# Patient Record
Sex: MALE | Race: WHITE | Employment: FULL TIME | URBAN - METROPOLITAN AREA
[De-identification: names, ages, dates, MRNs, and addresses within clinical notes are randomized per-mention and may not be internally consistent; named-entity substitution may affect disease eponyms.]

---

## 2017-04-18 ENCOUNTER — HOSPITAL ENCOUNTER (INPATIENT)
Facility: HOSPITAL | Age: 57
LOS: 2 days | Discharge: HOME | DRG: 310 | End: 2017-04-21
Attending: EMERGENCY MEDICINE | Admitting: INTERNAL MEDICINE
Payer: COMMERCIAL

## 2017-04-18 ENCOUNTER — APPOINTMENT (OUTPATIENT)
Dept: GENERAL RADIOLOGY | Facility: HOSPITAL | Age: 57
DRG: 310 | End: 2017-04-18
Attending: EMERGENCY MEDICINE
Payer: COMMERCIAL

## 2017-04-18 DIAGNOSIS — I48.91 ATRIAL FIBRILLATION WITH RAPID VENTRICULAR RESPONSE (HCC): Primary | ICD-10-CM

## 2017-04-18 PROCEDURE — 85025 COMPLETE CBC W/AUTO DIFF WBC: CPT | Performed by: EMERGENCY MEDICINE

## 2017-04-18 PROCEDURE — 84443 ASSAY THYROID STIM HORMONE: CPT | Performed by: EMERGENCY MEDICINE

## 2017-04-18 PROCEDURE — 71010 XR CHEST AP PORTABLE  (CPT=71010): CPT

## 2017-04-18 PROCEDURE — 84484 ASSAY OF TROPONIN QUANT: CPT | Performed by: EMERGENCY MEDICINE

## 2017-04-18 PROCEDURE — 96365 THER/PROPH/DIAG IV INF INIT: CPT

## 2017-04-18 PROCEDURE — 80053 COMPREHEN METABOLIC PANEL: CPT | Performed by: EMERGENCY MEDICINE

## 2017-04-18 PROCEDURE — 96366 THER/PROPH/DIAG IV INF ADDON: CPT

## 2017-04-18 PROCEDURE — 85379 FIBRIN DEGRADATION QUANT: CPT | Performed by: EMERGENCY MEDICINE

## 2017-04-18 PROCEDURE — 93010 ELECTROCARDIOGRAM REPORT: CPT | Performed by: EMERGENCY MEDICINE

## 2017-04-18 PROCEDURE — 99285 EMERGENCY DEPT VISIT HI MDM: CPT

## 2017-04-18 PROCEDURE — 93005 ELECTROCARDIOGRAM TRACING: CPT

## 2017-04-18 RX ORDER — ASPIRIN 325 MG
325 TABLET ORAL DAILY
Status: ON HOLD | COMMUNITY
End: 2017-06-17

## 2017-04-18 RX ORDER — DILTIAZEM HYDROCHLORIDE 5 MG/ML
10 INJECTION INTRAVENOUS
Status: DISPENSED | OUTPATIENT
Start: 2017-04-18 | End: 2017-04-19

## 2017-04-19 ENCOUNTER — APPOINTMENT (OUTPATIENT)
Dept: CV DIAGNOSTICS | Facility: HOSPITAL | Age: 57
DRG: 310 | End: 2017-04-19
Attending: INTERNAL MEDICINE
Payer: COMMERCIAL

## 2017-04-19 ENCOUNTER — APPOINTMENT (OUTPATIENT)
Dept: INTERVENTIONAL RADIOLOGY/VASCULAR | Facility: HOSPITAL | Age: 57
DRG: 310 | End: 2017-04-19
Attending: NURSE PRACTITIONER
Payer: COMMERCIAL

## 2017-04-19 PROCEDURE — 93320 DOPPLER ECHO COMPLETE: CPT

## 2017-04-19 PROCEDURE — 93306 TTE W/DOPPLER COMPLETE: CPT | Performed by: INTERNAL MEDICINE

## 2017-04-19 PROCEDURE — 93312 ECHO TRANSESOPHAGEAL: CPT

## 2017-04-19 PROCEDURE — 93306 TTE W/DOPPLER COMPLETE: CPT

## 2017-04-19 PROCEDURE — 92960 CARDIOVERSION ELECTRIC EXT: CPT

## 2017-04-19 PROCEDURE — 80048 BASIC METABOLIC PNL TOTAL CA: CPT | Performed by: INTERNAL MEDICINE

## 2017-04-19 PROCEDURE — 85027 COMPLETE CBC AUTOMATED: CPT | Performed by: INTERNAL MEDICINE

## 2017-04-19 PROCEDURE — 85730 THROMBOPLASTIN TIME PARTIAL: CPT | Performed by: INTERNAL MEDICINE

## 2017-04-19 PROCEDURE — 93325 DOPPLER ECHO COLOR FLOW MAPG: CPT

## 2017-04-19 PROCEDURE — B246ZZ4 ULTRASONOGRAPHY OF RIGHT AND LEFT HEART, TRANSESOPHAGEAL: ICD-10-PCS | Performed by: INTERNAL MEDICINE

## 2017-04-19 PROCEDURE — 84132 ASSAY OF SERUM POTASSIUM: CPT | Performed by: INTERNAL MEDICINE

## 2017-04-19 RX ORDER — SODIUM CHLORIDE 9 MG/ML
INJECTION, SOLUTION INTRAVENOUS
Status: DISPENSED
Start: 2017-04-19 | End: 2017-04-20

## 2017-04-19 RX ORDER — HEPARIN SODIUM 1000 [USP'U]/ML
INJECTION, SOLUTION INTRAVENOUS; SUBCUTANEOUS
Status: DISPENSED
Start: 2017-04-19 | End: 2017-04-19

## 2017-04-19 RX ORDER — HEPARIN SODIUM AND DEXTROSE 10000; 5 [USP'U]/100ML; G/100ML
1000 INJECTION INTRAVENOUS ONCE
Status: COMPLETED | OUTPATIENT
Start: 2017-04-19 | End: 2017-04-19

## 2017-04-19 RX ORDER — POTASSIUM CHLORIDE 20 MEQ/1
40 TABLET, EXTENDED RELEASE ORAL ONCE
Status: COMPLETED | OUTPATIENT
Start: 2017-04-19 | End: 2017-04-19

## 2017-04-19 RX ORDER — SODIUM CHLORIDE 9 MG/ML
INJECTION, SOLUTION INTRAVENOUS
Status: COMPLETED
Start: 2017-04-19 | End: 2017-04-19

## 2017-04-19 RX ORDER — HEPARIN SODIUM AND DEXTROSE 10000; 5 [USP'U]/100ML; G/100ML
INJECTION INTRAVENOUS CONTINUOUS
Status: DISCONTINUED | OUTPATIENT
Start: 2017-04-19 | End: 2017-04-19

## 2017-04-19 RX ORDER — HEPARIN SODIUM 5000 [USP'U]/ML
5000 INJECTION, SOLUTION INTRAVENOUS; SUBCUTANEOUS EVERY 12 HOURS SCHEDULED
Status: DISCONTINUED | OUTPATIENT
Start: 2017-04-19 | End: 2017-04-19

## 2017-04-19 RX ORDER — ASPIRIN 325 MG
325 TABLET ORAL DAILY
Status: DISCONTINUED | OUTPATIENT
Start: 2017-04-19 | End: 2017-04-19

## 2017-04-19 RX ORDER — HEPARIN SODIUM 1000 [USP'U]/ML
5000 INJECTION, SOLUTION INTRAVENOUS; SUBCUTANEOUS ONCE
Status: COMPLETED | OUTPATIENT
Start: 2017-04-19 | End: 2017-04-19

## 2017-04-19 RX ORDER — SODIUM CHLORIDE 9 MG/ML
INJECTION, SOLUTION INTRAVENOUS ONCE
Status: DISCONTINUED | OUTPATIENT
Start: 2017-04-19 | End: 2017-04-21

## 2017-04-19 RX ORDER — ASPIRIN 325 MG
325 TABLET ORAL DAILY
Status: DISCONTINUED | OUTPATIENT
Start: 2017-04-19 | End: 2017-04-21

## 2017-04-19 RX ORDER — MIDAZOLAM HYDROCHLORIDE 1 MG/ML
INJECTION INTRAMUSCULAR; INTRAVENOUS
Status: COMPLETED
Start: 2017-04-19 | End: 2017-04-19

## 2017-04-19 NOTE — H&P
[facility]    History and Physical    Tarun Feng Patient Status:  Inpatient    1960 MRN [id number]   Location [facility]5W Attending Cassie Dye MD   [facility] Day # 1 PCP None Pcp     Date:  2017  Date of Admission: WBC 7.3 04/19/2017   HGB 15.3 04/19/2017   HCT 44.7 04/19/2017    04/19/2017   CREATSERUM 0.97 04/19/2017   BUN 20 04/19/2017    04/19/2017   K 4.9 04/19/2017   K 4.9 04/19/2017    04/19/2017   CO2 29 04/19/2017   * 04/19/2017

## 2017-04-19 NOTE — CONSULTS
[facility] AND [facility]  Cardiology Consultation    Sadie Molina Patient Status:  Inpatient    1960 MRN [id number]   Location [facility]5W Attending Gilberto Montenegro MD   Hosp Day # 1 PCP None Pcp     Reason for Consultation:  Atrial fibrillation bruits  Cardiac: irreg irreg, no murmurs  Lungs: Clear without wheezes, no crackles  Abdomen: Soft, non-tender. Extremities: Without clubbing, cyanosis or edema. Peripheral pulses are palpable  Neurologic: Alert and oriented, normal affect.   Skin: Warm

## 2017-04-19 NOTE — PLAN OF CARE
Patient Centered Care    • Patient preferences are identified and integrated in the patient's plan of care Progressing    Client participates in care.  Activity limited D/T client HR possible cardioversion today, client has been NPO    Patient/Family Goals

## 2017-04-19 NOTE — PROCEDURES
Pre-op: Atrial fib  Post-op: Same  Procedure: LINN  Findings:  STIVEN thrombus                MVP with 2+ MR                LV normal                No ASD  Complications: none  Sedation: Versed and Fentanyl  EBL: 0 cc  Specimen: none  Condition: stable

## 2017-04-19 NOTE — PAYOR COMM NOTE
Impression:  Atrial fibrillation  Palpitations    Plan:  His last episode was 7 years ago and he was successfully electrically cardioverted at that time. He knows the exact onset, which was at 9PM last night.   He would prefer to try DCCV first if possible

## 2017-04-19 NOTE — TREATMENT PLAN
Client off the floor for LINN and cardioversion, consent signed per [facility] NP instructions. This RN will await clients arrival to floor.

## 2017-04-19 NOTE — ED PROVIDER NOTES
Patient Seen in: [facility] AND [facility] Emergency Department    History   Patient presents with:  Arrythmia/Palpitations (cardiovascular)    Stated Complaint: Heart Palpitations    HPI    Patient is a 58-year-old male whose describes onset of palpitations abo Cardiovascular: Irregular rate and rhythm. Pulmonary/Chest: Effort normal and breath sounds normal. No respiratory distress. Abdominal: Soft. Bowel sounds are normal. Exhibits no distension and no mass. There is no tenderness.  There is no rebound an 95%, Normal, room air    Cardiac Monitor: Atrial fib Pulse Readings from Last 1 Encounters:  04/19/17 : 104  ,    Radiology findings:     @.im     xray and findings reviewed by myself and discussed with patient and family if present        Disposition and

## 2017-04-20 PROCEDURE — 85049 AUTOMATED PLATELET COUNT: CPT | Performed by: INTERNAL MEDICINE

## 2017-04-20 RX ORDER — METOPROLOL TARTRATE 50 MG/1
50 TABLET, FILM COATED ORAL
Status: DISCONTINUED | OUTPATIENT
Start: 2017-04-20 | End: 2017-04-20

## 2017-04-20 RX ORDER — METOPROLOL TARTRATE 50 MG/1
50 TABLET, FILM COATED ORAL ONCE
Status: COMPLETED | OUTPATIENT
Start: 2017-04-20 | End: 2017-04-20

## 2017-04-20 RX ORDER — METOPROLOL TARTRATE 50 MG/1
100 TABLET, FILM COATED ORAL
Status: DISCONTINUED | OUTPATIENT
Start: 2017-04-20 | End: 2017-04-21

## 2017-04-20 NOTE — PROGRESS NOTES
LINN could not definitively r/o STIVEN thrombus. DCCV was therefore not performed. Plan:  Anticoagulate with Eliquis  Rate control. Cardiovert in 1 month if still in AF.

## 2017-04-20 NOTE — PROGRESS NOTES
[facility] Heart Cardiology Progress Note      Tarun Feng Patient Status:  Inpatient    1960 MRN [id number]   Location [facility]5W Attending Cassie Dye MD   Hosp Day # 2 PCP None Pcp       Assessment an 2x Daily(Beta Blocker)       Continuous Infusions:   • diltiazem Stopped (04/19/17 6135)       TELE:  afib with heart rates 120-130's.      Results:     Lab Results  Component Value Date   WBC 7.3 04/19/2017   HGB 15.3 04/19/2017   HCT 44.7 04/19/2017   PLT

## 2017-04-20 NOTE — PAYOR COMM NOTE
Attending Physician: Jakob Haynes MD    Review Type: ADMISSION   Reviewer:  Viridiana Ariza       Date: April 20, 2017 - 2:17 PM  Payor: [facility] POS/KATERIN  Authorization Number: [id number]  Admit date: 4/18/2017  9:45 PM   Admitted from Emergency Dep 04/18/17 2150 Oral   SpO2 04/18/17 2150 95 %   O2 Device 04/18/17 2150 None (Room air)       Current:/62 mmHg  Pulse 104  Temp(Src) 98 °F (36.7 °C) (Oral)  Resp 18  Ht 195.6 cm (6' 5\")  Wt 120.203 kg  BMI 31.42 kg/m2  SpO2 97%        Physical Exam -----------         ------                     CBC W/ DIFFERENTIAL[[id number]]                              Final result                 Please view results for these tests on the individual orders.    RAINBOW DRAW BLUE   RAINBOW DRAW GOLD describes onset of palpitations about an hour ago it occurred at rest.  He denies chest pain he denies shortness of breath he denies syncope or near syncope or lightheadedness.   Patient states he has had SVT in the past.  He had an ASD repaired about 20 ye tenderness. There is no rebound and no guarding. Musculoskeletal: Normal range of motion. Exhibits no edema or tenderness. Lymphadenopathy: No cervical adenopathy. Neurological: Alert and oriented to person, place, and time. Normal reflexes.  No crani present        Disposition and Plan     Clinical Impression:  Atrial fibrillation with rapid ventricular response ([facility] [address]  (primary encounter diagnosis)    Disposition:  There is no disposition on file for this visit.     Follow-up:  No follow-up provider spe Negative. Endocrine: Negative. Genitourinary: Negative. Musculoskeletal: Negative. Skin: Negative. Neurological: Negative. Hematological: Negative. Psychiatric/Behavioral: Negative.         Physical Exam:   Vital Signs:  Blood pressure signed by Kristin Orozco MD on 4/19/2017  6:58 PM        REVIEWER COMMENTS:     OTHER:

## 2017-04-20 NOTE — PROGRESS NOTES
[facility]    Progress Note    Fallon Balloon Patient Status:  Inpatient    1960 MRN [id number]   Location [facility]5W Attending Ang Berry MD   Hosp Day # 2 PCP None Pcp     Subjective:     Constitutional: Negative. discrepancies. Ekg 12-lead    4/18/2017  ECG Report  Interpretation  -------------------------- Atrial fibrillation -with ectopic ventricular couplets -Diffuse ST depression -consider subendocardial injury/ischemia.  ABNORMAL No previous ECGs availa

## 2017-04-20 NOTE — PLAN OF CARE
DISCHARGE PLANNING    • Discharge to home or other facility with appropriate resources Progressing    D/C pending HR control medication dosage have been increased throughout the day     PAIN - ADULT    • Verbalizes/displays adequate comfort level or patien

## 2017-04-21 VITALS
WEIGHT: 267.56 LBS | RESPIRATION RATE: 16 BRPM | DIASTOLIC BLOOD PRESSURE: 64 MMHG | HEIGHT: 77 IN | TEMPERATURE: 98 F | HEART RATE: 98 BPM | SYSTOLIC BLOOD PRESSURE: 106 MMHG | OXYGEN SATURATION: 96 % | BODY MASS INDEX: 31.59 KG/M2

## 2017-04-21 PROCEDURE — 85049 AUTOMATED PLATELET COUNT: CPT | Performed by: INTERNAL MEDICINE

## 2017-04-21 RX ORDER — METOPROLOL TARTRATE 100 MG/1
100 TABLET ORAL
Qty: 60 TABLET | Refills: 0 | Status: SHIPPED | OUTPATIENT
Start: 2017-04-21 | End: 2017-05-08

## 2017-04-21 NOTE — DISCHARGE SUMMARY
[facility]    Discharge Summary    Sadie Molina Patient Status:  Inpatient    1960 MRN [id number]   Location [facility]5W Attending Gilberto Montenegro MD   Hosp Day # 3 PCP None Pcp     Date of Admission: 2017 Dispositi 60 tablet   Refills:  0       Metoprolol Tartrate 100 MG Tabs   Last time this was given:  100 mg on 4/21/2017  5:34 AM   Commonly known as:  LOPRESSOR        Take 1 tablet (100 mg total) by mouth 2x Daily(Beta Blocker).     Quantity:  60 tablet   Refills:

## 2017-04-21 NOTE — PLAN OF CARE
CARDIOVASCULAR - ADULT    • Maintains optimal cardiac output and hemodynamic stability Progressing    • Absence of cardiac arrhythmias or at baseline Progressing    HR is improving. A-fib on tele.  HR ranges from  bpm.     DISCHARGE PLANNING    • Disc

## 2017-04-21 NOTE — PROGRESS NOTES
[facility] Heart Cardiology  Progress Note    Claudene Seltzer Patient Status:  Inpatient    1960 MRN [id number]   Location [facility]5W Attending Mary Ellen Talamantes MD   [facility] Day # 3 PCP None Pcp     Edilberto Contreras K 4.9 04/19/2017    04/19/2017   CO2 29 04/19/2017   * 04/19/2017   CA 9.4 04/19/2017   ALB 4.2 04/18/2017   ALKPHO 61 04/18/2017   BILT 0.9 04/18/2017   TP 6.9 04/18/2017   AST 37 04/18/2017   ALT 32 04/18/2017   PTT 40.2* 04/19/2017   TSH

## 2017-04-24 ENCOUNTER — TELEPHONE (OUTPATIENT)
Dept: CARDIOLOGY UNIT | Facility: HOSPITAL | Age: 57
End: 2017-04-24

## 2017-04-24 NOTE — PAYOR COMM NOTE
Admission Info: Inpatient (Adm: 04/18/17)   Hospital Account: [de-identified]    Description: 61year old M   Primary Service: Cardiac Telemetry   Unit Info: [facility] OBS         Discharge Summary Notes      Discharge Summaries by Edgardo Chávez MD at 4/2 with palpitations    Hospital Course: Pt admitted seen by cardiology started on eliquis and metoprolol.  HR improved , seen by EP  Will DC home if ok with cardiology    Consultations: Cardiology    Discharge Condition: Stable    Discharge Medications:

## 2017-04-26 ENCOUNTER — OFFICE VISIT (OUTPATIENT)
Dept: CARDIOLOGY CLINIC | Facility: HOSPITAL | Age: 57
End: 2017-04-26
Attending: INTERNAL MEDICINE
Payer: COMMERCIAL

## 2017-04-26 VITALS
HEART RATE: 104 BPM | OXYGEN SATURATION: 94 % | DIASTOLIC BLOOD PRESSURE: 68 MMHG | WEIGHT: 271 LBS | SYSTOLIC BLOOD PRESSURE: 106 MMHG | BODY MASS INDEX: 32 KG/M2

## 2017-04-26 DIAGNOSIS — R06.83 SNORING: ICD-10-CM

## 2017-04-26 DIAGNOSIS — I48.0 PAF (PAROXYSMAL ATRIAL FIBRILLATION) (HCC): Chronic | ICD-10-CM

## 2017-04-26 DIAGNOSIS — I48.19 PERSISTENT ATRIAL FIBRILLATION (HCC): ICD-10-CM

## 2017-04-26 DIAGNOSIS — G47.9 SLEEP DISTURBANCE: ICD-10-CM

## 2017-04-26 DIAGNOSIS — R42 DIZZINESS: ICD-10-CM

## 2017-04-26 DIAGNOSIS — I48.91 ATRIAL FIBRILLATION (HCC): Primary | ICD-10-CM

## 2017-04-26 DIAGNOSIS — Q21.1 ASD (ATRIAL SEPTAL DEFECT): ICD-10-CM

## 2017-04-26 DIAGNOSIS — I48.91 ATRIAL FIBRILLATION WITH RAPID VENTRICULAR RESPONSE (HCC): ICD-10-CM

## 2017-04-26 DIAGNOSIS — R07.89 ATYPICAL CHEST PAIN: ICD-10-CM

## 2017-04-26 PROBLEM — R07.9 CHEST PAIN: Status: ACTIVE | Noted: 2017-04-26

## 2017-04-26 PROBLEM — Q21.10 ASD (ATRIAL SEPTAL DEFECT) (HCC): Status: ACTIVE | Noted: 2017-04-26

## 2017-04-26 PROBLEM — Q21.10 ASD (ATRIAL SEPTAL DEFECT): Status: ACTIVE | Noted: 2017-04-26

## 2017-04-26 PROCEDURE — 93010 ELECTROCARDIOGRAM REPORT: CPT | Performed by: NURSE PRACTITIONER

## 2017-04-26 PROCEDURE — 99214 OFFICE O/P EST MOD 30 MIN: CPT | Performed by: NURSE PRACTITIONER

## 2017-04-26 PROCEDURE — 93005 ELECTROCARDIOGRAM TRACING: CPT

## 2017-04-26 PROCEDURE — 99212 OFFICE O/P EST SF 10 MIN: CPT | Performed by: NURSE PRACTITIONER

## 2017-04-26 RX ORDER — DIGOXIN 125 MCG
125 TABLET ORAL DAILY
Qty: 30 TABLET | Refills: 1 | Status: ON HOLD | OUTPATIENT
Start: 2017-04-26 | End: 2017-06-17

## 2017-04-26 NOTE — PROGRESS NOTES
[address]    Precious Thrasher Patient Status:  Outpatient    1960 MRN [id number]   Location [facility] None Pcp   Deb Macdonald MD        Precious Thrasher is a 61year old male who presents to clinic for [facility] melena, nausea and vomiting  Hematologic/lymphatic: negative  Musculoskeletal: negative for muscle weakness and myalgias    Objective:    Lab Results  Component Value Date/Time   WBC 7.3 04/19/2017 06:56 AM   HGB 15.3 04/19/2017 06:56 AM   HCT 44.7 04/19/2 family receptive. Assessment:  Atrial fibrillation with RVR, rate controlled on increased metoprolol to tartrate.   Anticoagulated on Eliquis  History of PAF and PSVT with multiple cardioversions  History of ASD repair in 1997  Snoring with sleep disturb to make an appointment for a nuclear stress test at Dr. Bryant Ramirez office in the next 2-4 weeks, call [phone] to schedule    Call to make an appointment to schedule a sleep study test with a [facility] sleep center at [phone]    The physician

## 2017-04-26 NOTE — PATIENT INSTRUCTIONS
Continue all your other same medications    Begin taking digoxin 0.125 mg one tablet daily    Call if having any dizziness, lightheadedness, heart racing, palpitations, chest pain, shortness of breath, coughing, swelling, weight gain or worsening symptoms.

## 2017-05-08 ENCOUNTER — OFFICE VISIT (OUTPATIENT)
Dept: CARDIOLOGY CLINIC | Facility: HOSPITAL | Age: 57
End: 2017-05-08
Attending: INTERNAL MEDICINE
Payer: COMMERCIAL

## 2017-05-08 VITALS
DIASTOLIC BLOOD PRESSURE: 75 MMHG | WEIGHT: 271 LBS | BODY MASS INDEX: 32 KG/M2 | OXYGEN SATURATION: 95 % | HEART RATE: 80 BPM | SYSTOLIC BLOOD PRESSURE: 106 MMHG

## 2017-05-08 DIAGNOSIS — Q21.1 ASD (ATRIAL SEPTAL DEFECT): ICD-10-CM

## 2017-05-08 DIAGNOSIS — R00.2 PALPITATIONS: ICD-10-CM

## 2017-05-08 DIAGNOSIS — R42 DIZZINESS: ICD-10-CM

## 2017-05-08 DIAGNOSIS — I48.0 PAF (PAROXYSMAL ATRIAL FIBRILLATION) (HCC): Chronic | ICD-10-CM

## 2017-05-08 DIAGNOSIS — I50.9 HEART FAILURE, UNSPECIFIED (HCC): Primary | ICD-10-CM

## 2017-05-08 PROBLEM — I48.19 ATRIAL FIBRILLATION, PERSISTENT (HCC): Status: ACTIVE | Noted: 2017-05-08

## 2017-05-08 PROCEDURE — 36415 COLL VENOUS BLD VENIPUNCTURE: CPT | Performed by: NURSE PRACTITIONER

## 2017-05-08 PROCEDURE — 99211 OFF/OP EST MAY X REQ PHY/QHP: CPT | Performed by: NURSE PRACTITIONER

## 2017-05-08 PROCEDURE — 80048 BASIC METABOLIC PNL TOTAL CA: CPT | Performed by: NURSE PRACTITIONER

## 2017-05-08 PROCEDURE — 80162 ASSAY OF DIGOXIN TOTAL: CPT | Performed by: NURSE PRACTITIONER

## 2017-05-08 PROCEDURE — 99214 OFFICE O/P EST MOD 30 MIN: CPT | Performed by: NURSE PRACTITIONER

## 2017-05-08 RX ORDER — METOPROLOL TARTRATE 100 MG/1
100 TABLET ORAL
Qty: 60 TABLET | Refills: 1 | Status: ON HOLD | OUTPATIENT
Start: 2017-05-08 | End: 2017-06-17

## 2017-05-08 NOTE — PATIENT INSTRUCTIONS
Continue all your same medications    Call if having any dizziness, lightheadedness, heart racing, palpitations, chest pain, shortness of breath, coughing, swelling, weight gain or worsening symptoms.      Plan for cardioversion procedure on May 22 nd , the

## 2017-05-08 NOTE — PROGRESS NOTES
[address] Patient Status:  Outpatient    1960 MRN [id number]   Location [facility] Pcp   Re Antonio MD        Asher Connelly is a 61year old male who presents to clinic for hospital f palpitations  Gastrointestinal: negative for abdominal pain, diarrhea, melena, nausea and vomiting  Hematologic/lymphatic: negative  Musculoskeletal: negative for muscle weakness and myalgias    Objective:    Lab Results  Component Value Date/Time   WBC 7. fibrillation exacerbation and when to call APN/clinic. Patient and family receptive. Assessment:  Atrial fibrillation with RVR, rate controlled on metoprolol tartrate and digoxin. Anticoagulated on Eliquis. Dig level is 0.3 today.   History of PAF and P stress test with Dr. Umberto Jon [phone]            I spent greater than 40 minutes with this patient providing counseling, coordination of care and education related specifically to heart failure.       Urban Elena NP  5/8/2017

## 2017-05-11 ENCOUNTER — TELEPHONE (OUTPATIENT)
Dept: CARDIOLOGY CLINIC | Facility: CLINIC | Age: 57
End: 2017-05-11

## 2017-05-11 NOTE — TELEPHONE ENCOUNTER
prior auth needed for cardioversion w/ anesthesia 5-19-17 at [address] [place] [address] w/ Dr Mary Alice De Paz

## 2017-05-11 NOTE — TELEPHONE ENCOUNTER
[address] contacted (back of the insurance card)- all information is provided through auto msg, ended up with \"quote completed with confirmation num: [id number]\" when asked to transfer to agent, it repeated the same thing with confirmation num, and unable

## 2017-05-17 RX ORDER — SODIUM CHLORIDE 9 MG/ML
INJECTION, SOLUTION INTRAVENOUS ONCE
Status: DISCONTINUED | OUTPATIENT
Start: 2017-05-19 | End: 2017-05-19

## 2017-05-19 ENCOUNTER — ANESTHESIA EVENT (OUTPATIENT)
Dept: INTERVENTIONAL RADIOLOGY/VASCULAR | Facility: HOSPITAL | Age: 57
End: 2017-05-19
Payer: COMMERCIAL

## 2017-05-19 ENCOUNTER — HOSPITAL ENCOUNTER (OUTPATIENT)
Dept: INTERVENTIONAL RADIOLOGY/VASCULAR | Facility: HOSPITAL | Age: 57
Discharge: HOME | End: 2017-05-19
Attending: INTERNAL MEDICINE | Admitting: INTERNAL MEDICINE
Payer: COMMERCIAL

## 2017-05-19 VITALS
OXYGEN SATURATION: 96 % | BODY MASS INDEX: 30.7 KG/M2 | HEART RATE: 74 BPM | SYSTOLIC BLOOD PRESSURE: 128 MMHG | WEIGHT: 260 LBS | HEIGHT: 77 IN | RESPIRATION RATE: 20 BRPM | DIASTOLIC BLOOD PRESSURE: 82 MMHG

## 2017-05-19 DIAGNOSIS — I48.91 A-FIB (HCC): ICD-10-CM

## 2017-05-19 PROCEDURE — 92960 CARDIOVERSION ELECTRIC EXT: CPT

## 2017-05-19 PROCEDURE — 93010 ELECTROCARDIOGRAM REPORT: CPT | Performed by: INTERNAL MEDICINE

## 2017-05-19 PROCEDURE — 5A2204Z RESTORATION OF CARDIAC RHYTHM, SINGLE: ICD-10-PCS | Performed by: INTERNAL MEDICINE

## 2017-05-19 PROCEDURE — 83735 ASSAY OF MAGNESIUM: CPT | Performed by: INTERNAL MEDICINE

## 2017-05-19 PROCEDURE — 93005 ELECTROCARDIOGRAM TRACING: CPT

## 2017-05-19 RX ORDER — MORPHINE SULFATE 2 MG/ML
2 INJECTION, SOLUTION INTRAMUSCULAR; INTRAVENOUS EVERY 10 MIN PRN
Status: DISCONTINUED | OUTPATIENT
Start: 2017-05-19 | End: 2017-05-19

## 2017-05-19 RX ORDER — MORPHINE SULFATE 4 MG/ML
4 INJECTION, SOLUTION INTRAMUSCULAR; INTRAVENOUS EVERY 10 MIN PRN
Status: DISCONTINUED | OUTPATIENT
Start: 2017-05-19 | End: 2017-05-19

## 2017-05-19 RX ORDER — GLYCOPYRROLATE 0.2 MG/ML
INJECTION INTRAMUSCULAR; INTRAVENOUS AS NEEDED
Status: DISCONTINUED | OUTPATIENT
Start: 2017-05-19 | End: 2017-05-19 | Stop reason: SURG

## 2017-05-19 RX ORDER — METOPROLOL TARTRATE 5 MG/5ML
2.5 INJECTION INTRAVENOUS ONCE
Status: DISCONTINUED | OUTPATIENT
Start: 2017-05-19 | End: 2017-05-19

## 2017-05-19 RX ORDER — ONDANSETRON 2 MG/ML
4 INJECTION INTRAMUSCULAR; INTRAVENOUS ONCE AS NEEDED
Status: DISCONTINUED | OUTPATIENT
Start: 2017-05-19 | End: 2017-05-19

## 2017-05-19 RX ORDER — SODIUM CHLORIDE 0.9 % (FLUSH) 0.9 %
10 SYRINGE (ML) INJECTION AS NEEDED
Status: DISCONTINUED | OUTPATIENT
Start: 2017-05-19 | End: 2017-05-19

## 2017-05-19 RX ORDER — HYDROMORPHONE HYDROCHLORIDE 1 MG/ML
0.6 INJECTION, SOLUTION INTRAMUSCULAR; INTRAVENOUS; SUBCUTANEOUS EVERY 5 MIN PRN
Status: DISCONTINUED | OUTPATIENT
Start: 2017-05-19 | End: 2017-05-19

## 2017-05-19 RX ORDER — HYDROMORPHONE HYDROCHLORIDE 1 MG/ML
0.2 INJECTION, SOLUTION INTRAMUSCULAR; INTRAVENOUS; SUBCUTANEOUS EVERY 5 MIN PRN
Status: DISCONTINUED | OUTPATIENT
Start: 2017-05-19 | End: 2017-05-19

## 2017-05-19 RX ORDER — HYDROMORPHONE HYDROCHLORIDE 1 MG/ML
0.4 INJECTION, SOLUTION INTRAMUSCULAR; INTRAVENOUS; SUBCUTANEOUS EVERY 5 MIN PRN
Status: DISCONTINUED | OUTPATIENT
Start: 2017-05-19 | End: 2017-05-19

## 2017-05-19 RX ORDER — HYDROCODONE BITARTRATE AND ACETAMINOPHEN 5; 325 MG/1; MG/1
1 TABLET ORAL AS NEEDED
Status: DISCONTINUED | OUTPATIENT
Start: 2017-05-19 | End: 2017-05-19

## 2017-05-19 RX ORDER — SODIUM CHLORIDE, SODIUM LACTATE, POTASSIUM CHLORIDE, CALCIUM CHLORIDE 600; 310; 30; 20 MG/100ML; MG/100ML; MG/100ML; MG/100ML
INJECTION, SOLUTION INTRAVENOUS CONTINUOUS PRN
Status: DISCONTINUED | OUTPATIENT
Start: 2017-05-19 | End: 2017-05-19 | Stop reason: SURG

## 2017-05-19 RX ORDER — NALOXONE HYDROCHLORIDE 0.4 MG/ML
80 INJECTION, SOLUTION INTRAMUSCULAR; INTRAVENOUS; SUBCUTANEOUS AS NEEDED
Status: DISCONTINUED | OUTPATIENT
Start: 2017-05-19 | End: 2017-05-19

## 2017-05-19 RX ORDER — MORPHINE SULFATE 10 MG/ML
6 INJECTION, SOLUTION INTRAMUSCULAR; INTRAVENOUS EVERY 10 MIN PRN
Status: DISCONTINUED | OUTPATIENT
Start: 2017-05-19 | End: 2017-05-19

## 2017-05-19 RX ORDER — ONDANSETRON 2 MG/ML
INJECTION INTRAMUSCULAR; INTRAVENOUS AS NEEDED
Status: DISCONTINUED | OUTPATIENT
Start: 2017-05-19 | End: 2017-05-19 | Stop reason: SURG

## 2017-05-19 RX ORDER — HYDROCODONE BITARTRATE AND ACETAMINOPHEN 5; 325 MG/1; MG/1
2 TABLET ORAL AS NEEDED
Status: DISCONTINUED | OUTPATIENT
Start: 2017-05-19 | End: 2017-05-19

## 2017-05-19 RX ORDER — HALOPERIDOL 5 MG/ML
0.25 INJECTION INTRAMUSCULAR ONCE AS NEEDED
Status: DISCONTINUED | OUTPATIENT
Start: 2017-05-19 | End: 2017-05-19

## 2017-05-19 RX ORDER — SODIUM CHLORIDE, SODIUM LACTATE, POTASSIUM CHLORIDE, CALCIUM CHLORIDE 600; 310; 30; 20 MG/100ML; MG/100ML; MG/100ML; MG/100ML
INJECTION, SOLUTION INTRAVENOUS CONTINUOUS
Status: DISCONTINUED | OUTPATIENT
Start: 2017-05-19 | End: 2017-05-19

## 2017-05-19 RX ORDER — SODIUM CHLORIDE 9 MG/ML
INJECTION, SOLUTION INTRAVENOUS
Status: DISCONTINUED
Start: 2017-05-19 | End: 2017-05-19

## 2017-05-19 RX ADMIN — GLYCOPYRROLATE 0.2 MG: 0.2 INJECTION INTRAMUSCULAR; INTRAVENOUS at 09:49:00

## 2017-05-19 RX ADMIN — SODIUM CHLORIDE, SODIUM LACTATE, POTASSIUM CHLORIDE, CALCIUM CHLORIDE: 600; 310; 30; 20 INJECTION, SOLUTION INTRAVENOUS at 09:45:00

## 2017-05-19 RX ADMIN — ONDANSETRON 4 MG: 2 INJECTION INTRAMUSCULAR; INTRAVENOUS at 09:49:00

## 2017-05-19 NOTE — ANESTHESIA PREPROCEDURE EVALUATION
Anesthesia PreOp Note    HPI:     Nestor Kline is a 61year old male who presents for preoperative consultation requested by: * No surgeons listed *    Date of Surgery: 5/19/2017    * No procedures listed *  Indication: * No pre-op diagnosis entered * Smoker     Smokeless tobacco: Not on file    Alcohol Use: Not on file    Drug Use: Not on file    Sexual Activity: Not on file   Not on file  Other Topics Concern   None on file     Social History Narrative       Available pre-op labs reviewed.     Lab Resu

## 2017-05-19 NOTE — PROCEDURES
[facility]         Cardiac Electrophysiology Procedure Note    [facility]   [facility] [id number] MRN [id number]   Admission Date 5/19/2017 Procedure Date 5/19/2017   Attending Physician Gómez Dick MD Procedure Ph

## 2017-05-19 NOTE — ANESTHESIA POSTPROCEDURE EVALUATION
Patient: Kaitlynn Flatten    Procedure Summary     Date Anesthesia Start Anesthesia Stop Room / Location    05/19/17 [facility] Interventional Suites       Procedure Diagnosis Scheduled Providers Responsible Provider    EP CARDIOVERSION 1X A-fib

## 2017-05-19 NOTE — PROGRESS NOTES
Pt post cardioversion. Pt was able to eat and drink post procedure, no complain of nausea or vomiting. EKG done before discharge. Discharge instructions were given to pt and wife.  Pt discharged by wheelchair

## 2017-06-13 ENCOUNTER — HOSPITAL ENCOUNTER (INPATIENT)
Facility: HOSPITAL | Age: 57
LOS: 4 days | Discharge: HOME | DRG: 418 | End: 2017-06-17
Attending: EMERGENCY MEDICINE | Admitting: INTERNAL MEDICINE
Payer: COMMERCIAL

## 2017-06-13 ENCOUNTER — APPOINTMENT (OUTPATIENT)
Dept: GENERAL RADIOLOGY | Facility: HOSPITAL | Age: 57
DRG: 418 | End: 2017-06-13
Attending: EMERGENCY MEDICINE
Payer: COMMERCIAL

## 2017-06-13 ENCOUNTER — APPOINTMENT (OUTPATIENT)
Dept: CT IMAGING | Facility: HOSPITAL | Age: 57
DRG: 418 | End: 2017-06-13
Attending: EMERGENCY MEDICINE
Payer: COMMERCIAL

## 2017-06-13 ENCOUNTER — APPOINTMENT (OUTPATIENT)
Dept: ULTRASOUND IMAGING | Facility: HOSPITAL | Age: 57
DRG: 418 | End: 2017-06-13
Attending: EMERGENCY MEDICINE
Payer: COMMERCIAL

## 2017-06-13 DIAGNOSIS — K81.0 ACUTE CHOLECYSTITIS: Primary | ICD-10-CM

## 2017-06-13 PROBLEM — E87.20 METABOLIC ACIDOSIS: Status: ACTIVE | Noted: 2017-06-13

## 2017-06-13 PROBLEM — R73.9 HYPERGLYCEMIA: Status: ACTIVE | Noted: 2017-06-13

## 2017-06-13 PROBLEM — E87.1 HYPONATREMIA: Status: ACTIVE | Noted: 2017-06-13

## 2017-06-13 PROBLEM — E87.2 METABOLIC ACIDOSIS: Status: ACTIVE | Noted: 2017-06-13

## 2017-06-13 PROCEDURE — 74176 CT ABD & PELVIS W/O CONTRAST: CPT | Performed by: EMERGENCY MEDICINE

## 2017-06-13 PROCEDURE — 84484 ASSAY OF TROPONIN QUANT: CPT | Performed by: EMERGENCY MEDICINE

## 2017-06-13 PROCEDURE — 96365 THER/PROPH/DIAG IV INF INIT: CPT

## 2017-06-13 PROCEDURE — 96376 TX/PRO/DX INJ SAME DRUG ADON: CPT

## 2017-06-13 PROCEDURE — 76705 ECHO EXAM OF ABDOMEN: CPT | Performed by: EMERGENCY MEDICINE

## 2017-06-13 PROCEDURE — 71010 XR CHEST AP PORTABLE  (CPT=71010): CPT | Performed by: EMERGENCY MEDICINE

## 2017-06-13 PROCEDURE — 96374 THER/PROPH/DIAG INJ IV PUSH: CPT

## 2017-06-13 PROCEDURE — 85025 COMPLETE CBC W/AUTO DIFF WBC: CPT | Performed by: EMERGENCY MEDICINE

## 2017-06-13 PROCEDURE — 99285 EMERGENCY DEPT VISIT HI MDM: CPT

## 2017-06-13 PROCEDURE — 83690 ASSAY OF LIPASE: CPT | Performed by: EMERGENCY MEDICINE

## 2017-06-13 PROCEDURE — 93010 ELECTROCARDIOGRAM REPORT: CPT | Performed by: EMERGENCY MEDICINE

## 2017-06-13 PROCEDURE — 80053 COMPREHEN METABOLIC PANEL: CPT | Performed by: EMERGENCY MEDICINE

## 2017-06-13 PROCEDURE — 85379 FIBRIN DEGRADATION QUANT: CPT | Performed by: EMERGENCY MEDICINE

## 2017-06-13 PROCEDURE — 93005 ELECTROCARDIOGRAM TRACING: CPT

## 2017-06-13 RX ORDER — MORPHINE SULFATE 2 MG/ML
2 INJECTION, SOLUTION INTRAMUSCULAR; INTRAVENOUS ONCE
Status: COMPLETED | OUTPATIENT
Start: 2017-06-13 | End: 2017-06-13

## 2017-06-13 RX ORDER — NITROGLYCERIN 0.4 MG/1
0.4 TABLET SUBLINGUAL ONCE
Status: COMPLETED | OUTPATIENT
Start: 2017-06-13 | End: 2017-06-13

## 2017-06-13 RX ORDER — DEXTROSE, SODIUM CHLORIDE, AND POTASSIUM CHLORIDE 5; .45; .15 G/100ML; G/100ML; G/100ML
INJECTION INTRAVENOUS CONTINUOUS
Status: DISCONTINUED | OUTPATIENT
Start: 2017-06-13 | End: 2017-06-17

## 2017-06-13 RX ORDER — MORPHINE SULFATE 2 MG/ML
INJECTION, SOLUTION INTRAMUSCULAR; INTRAVENOUS
Status: COMPLETED
Start: 2017-06-13 | End: 2017-06-13

## 2017-06-13 RX ORDER — DEXTROSE AND SODIUM CHLORIDE 5; .9 G/100ML; G/100ML
INJECTION, SOLUTION INTRAVENOUS CONTINUOUS
Status: DISCONTINUED | OUTPATIENT
Start: 2017-06-13 | End: 2017-06-14

## 2017-06-13 RX ORDER — DEXTROSE AND SODIUM CHLORIDE 5; .9 G/100ML; G/100ML
INJECTION, SOLUTION INTRAVENOUS
Status: COMPLETED
Start: 2017-06-13 | End: 2017-06-13

## 2017-06-13 RX ORDER — MORPHINE SULFATE 2 MG/ML
INJECTION, SOLUTION INTRAMUSCULAR; INTRAVENOUS
Status: DISPENSED
Start: 2017-06-13 | End: 2017-06-13

## 2017-06-13 RX ORDER — MORPHINE SULFATE 2 MG/ML
1 INJECTION, SOLUTION INTRAMUSCULAR; INTRAVENOUS EVERY 4 HOURS PRN
Status: DISCONTINUED | OUTPATIENT
Start: 2017-06-13 | End: 2017-06-17

## 2017-06-13 NOTE — ED PROVIDER NOTES
Patient Seen in: [facility] Emergency Department    History   Patient presents with:  Chest Pain Angina (cardiovascular)    Stated Complaint: Chest pain    HPI    61year old male presenting with chest pain.  Pain began 0300 woke from sleep with mi Current:/83 mmHg  Pulse 78  Temp(Src) 98 °F (36.7 °C) (Oral)  Resp 20  Ht 193 cm (6' 4\")  Wt 117.935 kg  BMI 31.66 kg/m2  SpO2 97%  PULSE OX Nl on room air          Physical Exam    Constitutional: appears uncomfortable, diaphoretic  HENT: mmm HOUR - Normal   TROPONIN I, 2 HOUR - Normal   LIPASE - Normal   D-DIMER - Normal   CBC WITH DIFFERENTIAL WITH PLATELET    Narrative: The following orders were created for panel order CBC WITH DIFFERENTIAL WITH PLATELET.   Procedure specified. Medications Prescribed:  Current Discharge Medication List        Present on Admission  Date Reviewed: 6/14/2017          ICD-10-CM Noted POA    * (Principal)Acute cholecystitis (Chronic) K81.0 6/13/2017 Yes    ASD (atrial septal defect) Q21.

## 2017-06-13 NOTE — PROGRESS NOTES
06/13/17 1650   Clinical Encounter Type   Visited With Patient and family together   Routine Visit Introduction   Continue Visiting Yes   Surgical Visit Pre-op   Patient's Supportive Strategies/Resources Spouse   Patient Spiritual Encounters   Spiritual

## 2017-06-13 NOTE — PROGRESS NOTES
[facility] Pharmacy Note: Antimicrobial Weight Dose Adjustment for:  Rubén Lucio is a 61year old male who has been prescribed ZOSYN 3.375GM IVPB  Q8H. CrCl is estimated creatinine clearance is 106.3 mL/min (based on Cr of 0.93).  and pt has a weight/

## 2017-06-13 NOTE — PLAN OF CARE
Plan of care reviewed with Dr Dudley Thacker. No CTA needed at this time. Hold Eliquis and Asa for pending surgery. No bridge necessary. Please call with questions or concerns.

## 2017-06-13 NOTE — ED INITIAL ASSESSMENT (HPI)
Awake from sleep at 0300 with anterior CP with associated nausea - took TUMS and vomited soon after - pain persists + diaphoresis. Recent cardioversion for Afib.  Took 325 ASA prior to presentation

## 2017-06-13 NOTE — CONSULTS
[facility]    Report of Consultation    Gloria Dang Patient Status:  Inpatient    1960 MRN [id number]   Location [facility] 4W/SW/SE Attending Nevaeh Singletary MD   Hosp Day # 0 PCP None Pcp     Date of Admission:   distress.   HEENT:      Abdomen:  Soft, non-distended, very minimal tenderness RUQ toward midline     Laboratory Data:    Lab Results  Component Value Date   WBC 9.5 06/13/2017   HGB 15.7 06/13/2017   HCT 45.1 06/13/2017    06/13/2017   CREATSERUM 0. intravenous contrast material.  Automated exposure control for dose reduction was used. Adjustment of the mA and/or kV was done based on the patient's size. Use of iterative reconstruction technique for dose reduction was used.    FINDINGS:  LIVER: 2.8 cm w PORTABLE (CPT=71010), 4/18/2017, 22:24. INDICATIONS: Anterior chest pain today. History of heart surgery. TECHNIQUE:   Single view. FINDINGS:  CARDIAC/VASC: The cardiac silhouette is not enlarged.  There are surgical clips over the cardiac silhouette st

## 2017-06-13 NOTE — CONSULTS
[facility]    PATIENT'S NAME: Oneil MATA   ATTENDING PHYSICIAN: Robert Khalil MD   CONSULTING PHYSICIAN: Abimael Obrien MD   PATIENT ACCOUNT#:   [id number]    LOCATION:  [address]  MEDICAL RECORD #:   [id number]       DATE OF BIRTH: Initial troponin 0.07.  D-dimer less than 0.27. Potassium 3.6.     EKG reveals sinus bradycardia but is otherwise normal.    Chest x-ray is normal.    IMPRESSION:  Lower substernal, upper abdominal pain as noted above with negative troponin and negative D-

## 2017-06-14 ENCOUNTER — APPOINTMENT (OUTPATIENT)
Dept: NUCLEAR MEDICINE | Facility: HOSPITAL | Age: 57
DRG: 418 | End: 2017-06-14
Attending: SPECIALIST
Payer: COMMERCIAL

## 2017-06-14 PROBLEM — K81.0 ACUTE CHOLECYSTITIS: Chronic | Status: ACTIVE | Noted: 2017-06-13

## 2017-06-14 PROCEDURE — 85025 COMPLETE CBC W/AUTO DIFF WBC: CPT | Performed by: SPECIALIST

## 2017-06-14 PROCEDURE — 83735 ASSAY OF MAGNESIUM: CPT | Performed by: NURSE PRACTITIONER

## 2017-06-14 PROCEDURE — 84132 ASSAY OF SERUM POTASSIUM: CPT | Performed by: NURSE PRACTITIONER

## 2017-06-14 PROCEDURE — CF1C1ZZ PLANAR NUCLEAR MEDICINE IMAGING OF HEPATOBILIARY SYSTEM, ALL USING TECHNETIUM 99M (TC-99M): ICD-10-PCS | Performed by: RADIOLOGY

## 2017-06-14 PROCEDURE — 78226 HEPATOBILIARY SYSTEM IMAGING: CPT | Performed by: SPECIALIST

## 2017-06-14 PROCEDURE — 80053 COMPREHEN METABOLIC PANEL: CPT | Performed by: SPECIALIST

## 2017-06-14 NOTE — PROGRESS NOTES
GS   Feels  Ok     Hepatobiliary - non fill  Consistent with acute cholecystitis, cholelithiasis  abd neg     Plan:   Stress test tomorrow   Cholecystectomy Friday 16, 2017   ( due to apixaban)

## 2017-06-14 NOTE — PROGRESS NOTES
[facility]    Cardiology Progress Note    Mariah Kaye Patient Status:  Inpatient    1960 MRN [id number]   Location [facility] 4W/SW/SE Attending Patience Crews MD   Hosp Day # 1 PCP None Pcp     Patient Active Problem List: 915 (7.8)     Net   +915                      No results for input(s): BNP in the last 72 hours. Us Gallbladder (cpt=76705)    6/13/2017  CONCLUSION: Cholelithiasis without other convincing sonographic evidence of acute cholecystitis.          Ct Abdomen

## 2017-06-15 ENCOUNTER — APPOINTMENT (OUTPATIENT)
Dept: NUCLEAR MEDICINE | Facility: HOSPITAL | Age: 57
DRG: 418 | End: 2017-06-15
Attending: INTERNAL MEDICINE
Payer: COMMERCIAL

## 2017-06-15 ENCOUNTER — APPOINTMENT (OUTPATIENT)
Dept: CV DIAGNOSTICS | Facility: HOSPITAL | Age: 57
DRG: 418 | End: 2017-06-15
Attending: INTERNAL MEDICINE
Payer: COMMERCIAL

## 2017-06-15 PROCEDURE — 93018 CV STRESS TEST I&R ONLY: CPT | Performed by: INTERNAL MEDICINE

## 2017-06-15 PROCEDURE — 85025 COMPLETE CBC W/AUTO DIFF WBC: CPT | Performed by: SPECIALIST

## 2017-06-15 PROCEDURE — 93017 CV STRESS TEST TRACING ONLY: CPT | Performed by: INTERNAL MEDICINE

## 2017-06-15 PROCEDURE — 78452 HT MUSCLE IMAGE SPECT MULT: CPT | Performed by: INTERNAL MEDICINE

## 2017-06-15 PROCEDURE — 93016 CV STRESS TEST SUPVJ ONLY: CPT | Performed by: INTERNAL MEDICINE

## 2017-06-15 RX ORDER — ACETAMINOPHEN 650 MG/1
650 SUPPOSITORY RECTAL EVERY 6 HOURS PRN
Status: DISCONTINUED | OUTPATIENT
Start: 2017-06-15 | End: 2017-06-15

## 2017-06-15 RX ORDER — ACETAMINOPHEN 325 MG/1
650 TABLET ORAL EVERY 6 HOURS PRN
Status: DISCONTINUED | OUTPATIENT
Start: 2017-06-15 | End: 2017-06-17

## 2017-06-15 RX ORDER — 0.9 % SODIUM CHLORIDE 0.9 %
VIAL (ML) INJECTION
Status: COMPLETED
Start: 2017-06-15 | End: 2017-06-15

## 2017-06-15 NOTE — PROGRESS NOTES
GS  Feels ok  abd neg  Stress test today   Lap cholecystectomy in am    Procedure and risks discussed  accepted

## 2017-06-15 NOTE — PROGRESS NOTES
06/15/17 1543   Clinical Encounter Type   Visited With Patient and family together   Continue Visiting No   Surgical Visit Pre-op   Family Spiritual Encounters   Family Participation in Care 4   Family Support During Treatment 4

## 2017-06-15 NOTE — PROGRESS NOTES
[facility] Heart Cardiology   Progress Note    Deo Jacome Patient Status:  Inpatient    1960 MRN [id number]   Location [facility] 4W/SW/SE Attending Letitia Linda MD   Hosp Day # 2 PCP None Pcp       ATTENDING results  -troponin X 2 negative, d-dimer negative, CXR negative  -last LINN on 4/19 with EF65-70% and echo with EF55-60%    4) ASD  -s/p repair    Recommendations:  -await stress test  -consider repeat echo  -monitor rates closely, consider low dose beta-bl -------------------------- Sinus Bradycardia WITHIN NORMAL LIMITS When compared with ECG of 06/13/2017 09:12:46 No significant changes have occurred Electronically signed on 06/13/2017 at 17:55 by ULISES Pop  7/94/4752

## 2017-06-15 NOTE — H&P
[facility]    History and Physical    Kimmy Kassy Patient Status:  Inpatient    1960 MRN [id number]   Location [facility] 4W/SW/SE Attending Tori Mchugh MD   Hosp Day # 1 PCP None Pcp     Date:  2017  Date of Admiss well-developed and well-nourished. HENT:   Head: Normocephalic and atraumatic. Cardiovascular: Normal rate, regular rhythm and normal heart sounds. Edema not present.   Pulmonary/Chest: Effort normal and breath sounds normal.   Abdominal: There is t Chest Ap Portable  (cpt=71010)    6/13/2017  CONCLUSION: No acute cardiopulmonary abnormality.        Ekg 12-lead    6/13/2017  ECG Report  Interpretation  -------------------------- Sinus Bradycardia WITHIN NORMAL LIMITS When compared with ECG of 06/13/201

## 2017-06-16 ENCOUNTER — APPOINTMENT (OUTPATIENT)
Dept: GENERAL RADIOLOGY | Facility: HOSPITAL | Age: 57
DRG: 418 | End: 2017-06-16
Attending: SPECIALIST
Payer: COMMERCIAL

## 2017-06-16 ENCOUNTER — SURGERY (OUTPATIENT)
Age: 57
End: 2017-06-16

## 2017-06-16 ENCOUNTER — ANESTHESIA (OUTPATIENT)
Dept: SURGERY | Facility: HOSPITAL | Age: 57
DRG: 418 | End: 2017-06-16
Payer: COMMERCIAL

## 2017-06-16 ENCOUNTER — ANESTHESIA EVENT (OUTPATIENT)
Dept: SURGERY | Facility: HOSPITAL | Age: 57
DRG: 418 | End: 2017-06-16
Payer: COMMERCIAL

## 2017-06-16 PROCEDURE — 88304 TISSUE EXAM BY PATHOLOGIST: CPT | Performed by: SPECIALIST

## 2017-06-16 PROCEDURE — 83735 ASSAY OF MAGNESIUM: CPT | Performed by: NURSE PRACTITIONER

## 2017-06-16 PROCEDURE — 0FT44ZZ RESECTION OF GALLBLADDER, PERCUTANEOUS ENDOSCOPIC APPROACH: ICD-10-PCS | Performed by: SPECIALIST

## 2017-06-16 PROCEDURE — 80048 BASIC METABOLIC PNL TOTAL CA: CPT | Performed by: NURSE PRACTITIONER

## 2017-06-16 PROCEDURE — BF131ZZ FLUOROSCOPY OF GALLBLADDER AND BILE DUCTS USING LOW OSMOLAR CONTRAST: ICD-10-PCS | Performed by: SPECIALIST

## 2017-06-16 PROCEDURE — 82248 BILIRUBIN DIRECT: CPT | Performed by: SPECIALIST

## 2017-06-16 PROCEDURE — 74300 X-RAY BILE DUCTS/PANCREAS: CPT | Performed by: SPECIALIST

## 2017-06-16 RX ORDER — METOPROLOL TARTRATE 5 MG/5ML
2.5 INJECTION INTRAVENOUS ONCE
Status: DISCONTINUED | OUTPATIENT
Start: 2017-06-16 | End: 2017-06-16 | Stop reason: HOSPADM

## 2017-06-16 RX ORDER — HYDROMORPHONE HYDROCHLORIDE 1 MG/ML
0.6 INJECTION, SOLUTION INTRAMUSCULAR; INTRAVENOUS; SUBCUTANEOUS EVERY 5 MIN PRN
Status: DISCONTINUED | OUTPATIENT
Start: 2017-06-16 | End: 2017-06-16 | Stop reason: HOSPADM

## 2017-06-16 RX ORDER — HYDROMORPHONE HYDROCHLORIDE 1 MG/ML
0.2 INJECTION, SOLUTION INTRAMUSCULAR; INTRAVENOUS; SUBCUTANEOUS EVERY 5 MIN PRN
Status: DISCONTINUED | OUTPATIENT
Start: 2017-06-16 | End: 2017-06-16 | Stop reason: HOSPADM

## 2017-06-16 RX ORDER — DOCUSATE SODIUM 100 MG/1
100 CAPSULE, LIQUID FILLED ORAL 2 TIMES DAILY
Status: DISCONTINUED | OUTPATIENT
Start: 2017-06-16 | End: 2017-06-17

## 2017-06-16 RX ORDER — POLYETHYLENE GLYCOL 3350 17 G/17G
17 POWDER, FOR SOLUTION ORAL DAILY PRN
Status: DISCONTINUED | OUTPATIENT
Start: 2017-06-16 | End: 2017-06-17

## 2017-06-16 RX ORDER — MORPHINE SULFATE 4 MG/ML
4 INJECTION, SOLUTION INTRAMUSCULAR; INTRAVENOUS EVERY 10 MIN PRN
Status: DISCONTINUED | OUTPATIENT
Start: 2017-06-16 | End: 2017-06-16 | Stop reason: HOSPADM

## 2017-06-16 RX ORDER — HYDROMORPHONE HYDROCHLORIDE 1 MG/ML
1.2 INJECTION, SOLUTION INTRAMUSCULAR; INTRAVENOUS; SUBCUTANEOUS EVERY 2 HOUR PRN
Status: DISCONTINUED | OUTPATIENT
Start: 2017-06-16 | End: 2017-06-17

## 2017-06-16 RX ORDER — MORPHINE SULFATE 10 MG/ML
6 INJECTION, SOLUTION INTRAMUSCULAR; INTRAVENOUS EVERY 10 MIN PRN
Status: DISCONTINUED | OUTPATIENT
Start: 2017-06-16 | End: 2017-06-16 | Stop reason: HOSPADM

## 2017-06-16 RX ORDER — HYDROCODONE BITARTRATE AND ACETAMINOPHEN 10; 325 MG/1; MG/1
2 TABLET ORAL EVERY 4 HOURS PRN
Status: DISCONTINUED | OUTPATIENT
Start: 2017-06-16 | End: 2017-06-17

## 2017-06-16 RX ORDER — NALOXONE HYDROCHLORIDE 0.4 MG/ML
80 INJECTION, SOLUTION INTRAMUSCULAR; INTRAVENOUS; SUBCUTANEOUS AS NEEDED
Status: DISCONTINUED | OUTPATIENT
Start: 2017-06-16 | End: 2017-06-16 | Stop reason: HOSPADM

## 2017-06-16 RX ORDER — DEXAMETHASONE SODIUM PHOSPHATE 4 MG/ML
VIAL (ML) INJECTION AS NEEDED
Status: DISCONTINUED | OUTPATIENT
Start: 2017-06-16 | End: 2017-06-16 | Stop reason: SURG

## 2017-06-16 RX ORDER — HYDROMORPHONE HYDROCHLORIDE 1 MG/ML
0.8 INJECTION, SOLUTION INTRAMUSCULAR; INTRAVENOUS; SUBCUTANEOUS EVERY 2 HOUR PRN
Status: DISCONTINUED | OUTPATIENT
Start: 2017-06-16 | End: 2017-06-17

## 2017-06-16 RX ORDER — GLYCOPYRROLATE 0.2 MG/ML
INJECTION INTRAMUSCULAR; INTRAVENOUS AS NEEDED
Status: DISCONTINUED | OUTPATIENT
Start: 2017-06-16 | End: 2017-06-16 | Stop reason: SURG

## 2017-06-16 RX ORDER — METOPROLOL TARTRATE 100 MG/1
100 TABLET ORAL
Status: DISCONTINUED | OUTPATIENT
Start: 2017-06-17 | End: 2017-06-17

## 2017-06-16 RX ORDER — SODIUM PHOSPHATE, DIBASIC AND SODIUM PHOSPHATE, MONOBASIC 7; 19 G/133ML; G/133ML
1 ENEMA RECTAL ONCE AS NEEDED
Status: DISCONTINUED | OUTPATIENT
Start: 2017-06-16 | End: 2017-06-17

## 2017-06-16 RX ORDER — HYDROMORPHONE HYDROCHLORIDE 1 MG/ML
0.4 INJECTION, SOLUTION INTRAMUSCULAR; INTRAVENOUS; SUBCUTANEOUS EVERY 2 HOUR PRN
Status: DISCONTINUED | OUTPATIENT
Start: 2017-06-16 | End: 2017-06-17

## 2017-06-16 RX ORDER — LIDOCAINE HYDROCHLORIDE 10 MG/ML
INJECTION, SOLUTION EPIDURAL; INFILTRATION; INTRACAUDAL; PERINEURAL AS NEEDED
Status: DISCONTINUED | OUTPATIENT
Start: 2017-06-16 | End: 2017-06-16 | Stop reason: SURG

## 2017-06-16 RX ORDER — BISACODYL 10 MG
10 SUPPOSITORY, RECTAL RECTAL
Status: DISCONTINUED | OUTPATIENT
Start: 2017-06-16 | End: 2017-06-17

## 2017-06-16 RX ORDER — HYDROCODONE BITARTRATE AND ACETAMINOPHEN 5; 325 MG/1; MG/1
2 TABLET ORAL AS NEEDED
Status: DISCONTINUED | OUTPATIENT
Start: 2017-06-16 | End: 2017-06-16 | Stop reason: HOSPADM

## 2017-06-16 RX ORDER — HALOPERIDOL 5 MG/ML
0.25 INJECTION INTRAMUSCULAR ONCE AS NEEDED
Status: DISCONTINUED | OUTPATIENT
Start: 2017-06-16 | End: 2017-06-16 | Stop reason: HOSPADM

## 2017-06-16 RX ORDER — HYDROMORPHONE HYDROCHLORIDE 1 MG/ML
0.4 INJECTION, SOLUTION INTRAMUSCULAR; INTRAVENOUS; SUBCUTANEOUS EVERY 5 MIN PRN
Status: DISCONTINUED | OUTPATIENT
Start: 2017-06-16 | End: 2017-06-16 | Stop reason: HOSPADM

## 2017-06-16 RX ORDER — SODIUM CHLORIDE, SODIUM LACTATE, POTASSIUM CHLORIDE, CALCIUM CHLORIDE 600; 310; 30; 20 MG/100ML; MG/100ML; MG/100ML; MG/100ML
INJECTION, SOLUTION INTRAVENOUS CONTINUOUS
Status: DISCONTINUED | OUTPATIENT
Start: 2017-06-16 | End: 2017-06-17

## 2017-06-16 RX ORDER — ONDANSETRON 2 MG/ML
INJECTION INTRAMUSCULAR; INTRAVENOUS AS NEEDED
Status: DISCONTINUED | OUTPATIENT
Start: 2017-06-16 | End: 2017-06-16 | Stop reason: SURG

## 2017-06-16 RX ORDER — ONDANSETRON 2 MG/ML
4 INJECTION INTRAMUSCULAR; INTRAVENOUS ONCE AS NEEDED
Status: DISCONTINUED | OUTPATIENT
Start: 2017-06-16 | End: 2017-06-16 | Stop reason: HOSPADM

## 2017-06-16 RX ORDER — MORPHINE SULFATE 2 MG/ML
2 INJECTION, SOLUTION INTRAMUSCULAR; INTRAVENOUS EVERY 10 MIN PRN
Status: DISCONTINUED | OUTPATIENT
Start: 2017-06-16 | End: 2017-06-16 | Stop reason: HOSPADM

## 2017-06-16 RX ORDER — SODIUM CHLORIDE, SODIUM LACTATE, POTASSIUM CHLORIDE, CALCIUM CHLORIDE 600; 310; 30; 20 MG/100ML; MG/100ML; MG/100ML; MG/100ML
INJECTION, SOLUTION INTRAVENOUS CONTINUOUS PRN
Status: DISCONTINUED | OUTPATIENT
Start: 2017-06-16 | End: 2017-06-16 | Stop reason: SURG

## 2017-06-16 RX ORDER — HYDROCODONE BITARTRATE AND ACETAMINOPHEN 5; 325 MG/1; MG/1
1 TABLET ORAL AS NEEDED
Status: DISCONTINUED | OUTPATIENT
Start: 2017-06-16 | End: 2017-06-16 | Stop reason: HOSPADM

## 2017-06-16 RX ORDER — METOPROLOL TARTRATE 5 MG/5ML
2.5 INJECTION INTRAVENOUS EVERY 4 HOURS
Status: DISPENSED | OUTPATIENT
Start: 2017-06-16 | End: 2017-06-17

## 2017-06-16 RX ORDER — HYDROCODONE BITARTRATE AND ACETAMINOPHEN 10; 325 MG/1; MG/1
1 TABLET ORAL EVERY 4 HOURS PRN
Status: DISCONTINUED | OUTPATIENT
Start: 2017-06-16 | End: 2017-06-17

## 2017-06-16 RX ORDER — NEOSTIGMINE METHYLSULFATE 0.5 MG/ML
INJECTION INTRAVENOUS AS NEEDED
Status: DISCONTINUED | OUTPATIENT
Start: 2017-06-16 | End: 2017-06-16 | Stop reason: SURG

## 2017-06-16 RX ORDER — ROCURONIUM BROMIDE 10 MG/ML
INJECTION, SOLUTION INTRAVENOUS AS NEEDED
Status: DISCONTINUED | OUTPATIENT
Start: 2017-06-16 | End: 2017-06-16 | Stop reason: SURG

## 2017-06-16 RX ADMIN — ROCURONIUM BROMIDE 40 MG: 10 INJECTION, SOLUTION INTRAVENOUS at 07:40:00

## 2017-06-16 RX ADMIN — DEXAMETHASONE SODIUM PHOSPHATE 4 MG: 4 MG/ML VIAL (ML) INJECTION at 07:33:00

## 2017-06-16 RX ADMIN — GLYCOPYRROLATE 0.6 MG: 0.2 INJECTION INTRAMUSCULAR; INTRAVENOUS at 08:40:00

## 2017-06-16 RX ADMIN — NEOSTIGMINE METHYLSULFATE 3 MG: 0.5 INJECTION INTRAVENOUS at 08:40:00

## 2017-06-16 RX ADMIN — ONDANSETRON 4 MG: 2 INJECTION INTRAMUSCULAR; INTRAVENOUS at 07:33:00

## 2017-06-16 RX ADMIN — SODIUM CHLORIDE, SODIUM LACTATE, POTASSIUM CHLORIDE, CALCIUM CHLORIDE: 600; 310; 30; 20 INJECTION, SOLUTION INTRAVENOUS at 08:40:00

## 2017-06-16 RX ADMIN — LIDOCAINE HYDROCHLORIDE 50 MG: 10 INJECTION, SOLUTION EPIDURAL; INFILTRATION; INTRACAUDAL; PERINEURAL at 07:33:00

## 2017-06-16 RX ADMIN — SODIUM CHLORIDE, SODIUM LACTATE, POTASSIUM CHLORIDE, CALCIUM CHLORIDE: 600; 310; 30; 20 INJECTION, SOLUTION INTRAVENOUS at 07:25:00

## 2017-06-16 NOTE — ANESTHESIA POSTPROCEDURE EVALUATION
Patient: Jennifer Holcomb    Procedure Summary     Date Anesthesia Start Anesthesia Stop Room / Location    06/16/17 0730  [facility] MAIN OR 03 / [facility] MAIN OR       Procedure Diagnosis Surgeon Responsible Provider    LAPAROSCOPIC CHOLECYSTECTOMY (N/A ) (Acute cholec

## 2017-06-16 NOTE — ANESTHESIA PREPROCEDURE EVALUATION
Anesthesia PreOp Note    HPI:     Boyd Rosa is a 61year old male who presents for preoperative consultation requested by: Chencho Castro MD    Date of Surgery: 6/13/2017 - 6/16/2017    Procedure(s):  LAPAROSCOPIC CHOLECYSTECTOMY  Indication: Acute injection 1 mg 1 mg Intravenous Q4H PRN Anthony Arredondo MD 1 mg at 06/13/17 1642    [MAR Hold] Piperacillin Sod-Tazobactam So (ZOSYN) 4.5 g in dextrose 5 % 100 mL IVPB 4.5 g Intravenous Q8H Anthony Arredondo MD Last Rate: 25 mL/hr at 06/16/17 0103 4.5 Oral Oral Oral   Resp: 18 18 20 18   Height:       Weight:       SpO2: 95% 95% 92% 92%        Anesthesia ROS/Med Hx and Physical Exam     Patient summary reviewed and Nursing notes reviewed    Airway   Mallampati: I  TM distance: >3 FB  Neck ROM: full  Den

## 2017-06-16 NOTE — PROGRESS NOTES
[facility]    Progress Note    Dirk Gregorio Patient Status:  Inpatient    1960 MRN [id number]   Location [facility] 4W/SW/SE Attending Prosper Bahena MD   Hosp Day # 2 PCP None Pcp     Subjective:     Constitutional: Marek Thomson 06/14/2017   * 06/14/2017   CA 8.8 06/14/2017   ALB 4.0 06/14/2017   ALKPHO 52 06/14/2017   BILT 1.1 06/14/2017   TP 6.6 06/14/2017   AST 18 06/14/2017   ALT 16* 06/14/2017   PTT 40.2* 04/19/2017   TSH 3.33 04/18/2017   LIP 31 06/13/2017   DDIMER <0

## 2017-06-16 NOTE — PROGRESS NOTES
[facility]    Progress Note    Deo Jacome Patient Status:  Inpatient    1960 MRN [id number]   Location [facility] 4W/SW/SE Attending Letitia Linda MD   Hosp Day # 3 PCP None Pcp     Subjective:     Constitutional: Alma Victor ventricular response ([address]  Rate controlled on CD gtt. ASD (atrial septal defect)  Repaired remotely      Hyponatremia  Resolved.       Metabolic acidosis  resolved      Hyperglycemia  Transient, resolved        Results:       Lab Results  Component Va post op when safe (ASA 81 mg daily)      Atrial fibrillation with rapid ventricular response (HCC)  Rate controlled on CD gtt. ASD (atrial septal defect)  Repaired remotely      Hyponatremia  Resolved.       Metabolic acidosis  resolved      Hyperglyce

## 2017-06-16 NOTE — BRIEF OP NOTE
Pre-Operative Diagnosis: Acute cholecystitis, cholelithiasis   Post-Operative Diagnosis: same     Procedure Performed:   Procedure(s):  Laparoscopic cholecystectomy intraoperative cholangiogram    Surgeon(s) and Role:     MD González Lind

## 2017-06-16 NOTE — PROGRESS NOTES
[facility]    Cardiology - [facility]  Progress Note    Antwon Vargas Patient Status:  Inpatient    1960 MRN [id number]   Location [facility] 4W/SW/SE Attending Collette Later, MD   Hosp Day # 3 PCP None Pcp       Assessment and ALT 16* 06/14/2017   PTT 40.2* 04/19/2017   TSH 3.33 04/18/2017   LIP 31 06/13/2017   DDIMER <0.27 06/13/2017   MG 2.2 06/16/2017   TROP 0.00 06/13/2017   TROP 0.00 06/13/2017       Xr Oper Cholangiogram (cpt=74300)    6/16/2017  CONCLUSION: Tin Vila

## 2017-06-17 ENCOUNTER — PRIOR ORIGINAL RECORDS (OUTPATIENT)
Dept: OTHER | Age: 57
End: 2017-06-17

## 2017-06-17 VITALS
DIASTOLIC BLOOD PRESSURE: 76 MMHG | BODY MASS INDEX: 31.66 KG/M2 | RESPIRATION RATE: 18 BRPM | OXYGEN SATURATION: 94 % | HEIGHT: 76 IN | SYSTOLIC BLOOD PRESSURE: 129 MMHG | HEART RATE: 90 BPM | TEMPERATURE: 98 F | WEIGHT: 260 LBS

## 2017-06-17 PROCEDURE — 82247 BILIRUBIN TOTAL: CPT | Performed by: SPECIALIST

## 2017-06-17 PROCEDURE — 83690 ASSAY OF LIPASE: CPT | Performed by: SPECIALIST

## 2017-06-17 PROCEDURE — 82150 ASSAY OF AMYLASE: CPT | Performed by: SPECIALIST

## 2017-06-17 PROCEDURE — 82248 BILIRUBIN DIRECT: CPT | Performed by: SPECIALIST

## 2017-06-17 PROCEDURE — 85025 COMPLETE CBC W/AUTO DIFF WBC: CPT | Performed by: SPECIALIST

## 2017-06-17 RX ORDER — METOPROLOL TARTRATE 100 MG/1
100 TABLET ORAL
Qty: 60 TABLET | Refills: 1 | Status: SHIPPED | OUTPATIENT
Start: 2017-06-17 | End: 2017-06-17

## 2017-06-17 RX ORDER — METOPROLOL TARTRATE 100 MG/1
100 TABLET ORAL
Qty: 60 TABLET | Refills: 1 | Status: SHIPPED | OUTPATIENT
Start: 2017-06-17 | End: 2017-10-23

## 2017-06-17 RX ORDER — ASPIRIN 81 MG/1
81 TABLET ORAL DAILY
Qty: 100 TABLET | Refills: 5 | Status: ON HOLD | OUTPATIENT
Start: 2017-06-17 | End: 2018-01-09

## 2017-06-17 NOTE — PROGRESS NOTES
[facility]    Progress Note    Sari Ireland Patient Status:  Inpatient    1960 MRN [id number]   Location [facility] 4W/SW/SE Attending Yuan Hu MD   Hosp Day # 4 PCP None Pcp     Subjective:     Constitutional: Khanh Simmons metoprolol 100 mg bid      ASD (atrial septal defect)  Repaired remotely      Hyponatremia  Resolved.       Metabolic acidosis  resolved      Hyperglycemia  Transient, resolved        Results:       Lab Results  Component Value Date   WBC 10.1 06/17/2017 safe (ASA 81 mg daily)      Atrial fibrillation with rapid ventricular response (HCC)  Rate controlled on CD gtt. ASD (atrial septal defect)  Repaired remotely      Hyponatremia  Resolved.       Metabolic acidosis  resolved      Hyperglycemia  Coralyn Quiet

## 2017-06-17 NOTE — PROGRESS NOTES
GS   Feels ok  PO increasing  abd neg   Wound clean  No erythema  Labs nl.   Ok for aspirin  '  May be discharged from surgical standpoint    To see me 10 day

## 2017-06-19 NOTE — PAYOR COMM NOTE
--------------  ADMISSION REVIEW     Payor: Milka MACKEY/KATERIN  Subscriber #:  [id number]  Authorization Number: [id number]    Admit date: 6/13/2017  9:10 AM         Patient Seen in: [facility] Emergency Department    History   Patient presents with 4\")  Wt 117.935 kg  BMI 31.66 kg/m2  SpO2 97%  PULSE OX Nl on room air      ED Course     Labs Reviewed   COMP METABOLIC PANEL (14) - Abnormal; Notable for the following:     Glucose 139 (*)     Sodium 135 (*)     CO2 19 (*)     Globulin 2.4 (*)     All o Physical      History provided by:patient  HPI:   Patient presents with:  Chest Pain Angina (cardiovascular)    HPI chest and upper abdominal pain    History     Past Medical History   Diagnosis Date   • ASD (atrial septal defect)    • SVT (supraventricula Mild epigastric and RUQ tenderness   Musculoskeletal: Normal range of motion. Neurological: He is alert and oriented to person, place, and time. Skin: Skin is warm and dry. Psychiatric: He has a normal mood and affect.          Results:     Lab Resu No significant changes have occurred Electronically signed on 06/13/2017 at 17:55 by Rafat Monson 12-lead    6/13/2017  ECG Report  Interpretation  -------------------------- Sinus Bradycardia -Nonspecific QRS widening.  BORDERLINE When compared wi Zosyn      PAF (paroxysmal atrial fibrillation) (HCC)  CD gtt goldie-op, AC post op when safe.      Atrial fibrillation with rapid ventricular response (HCC)  Rate controlled on CD gtt.      ASD (atrial septal defect)  Repaired remotely      Hyponatremia  Re There is no tenderness. Neurological: He is alert and oriented to person, place, and time. No cranial nerve deficit, sensory deficit or motor deficit. Skin: Skin is warm and dry.    Psychiatric: He has a normal mood and affect.         Assessment and Pl

## 2017-06-21 NOTE — PAYOR COMM NOTE
Stephanie Munguia RN-      Date of Consult:  6/13/201    History of Present Illness:  Tracy Jordan is a a(n) 61year old male.  Developed epigastric and RUQ pain this am with vomiting .   No change in bowels    No food intolerance or heartburn    Had c 15.7  06/13/2017    HCT  45.1  06/13/2017    PLT  199  06/13/2017    CREATSERUM  0.93  06/13/2017    BUN  13  06/13/2017    NA  135*  06/13/2017    K  3.6  06/13/2017    CL  102  06/13/2017    CO2  19*  06/13/2017    GLU  139*  06/13/2017    CA  9.4  06/13 dose reduction was used. Adjustment of the mA and/or kV was done based on the patient's size. Use of iterative reconstruction technique for dose reduction was used.   FINDINGS:          LIVER: 2.8 cm water density right hepatic dome lesion.  Punctate hepati TIME:9:44 AM.   [facility], XR CHEST AP PORTABLE (CPT=71010), 4/18/2017, 22:24.  INDICATIONS:          Anterior chest pain today.  History of heart surgery.  TECHNIQUE:   Single view.   FINDINGS:            CARDIAC/VASC:         T

## 2017-06-27 LAB
BUN: 7 MG/DL
CALCIUM: 9.5 MG/DL
CHLORIDE: 103 MEQ/L
CREATININE, SERUM: 1.03 MG/DL
GLUCOSE: 123 MG/DL
HEMATOCRIT: 42.7 %
HEMOGLOBIN: 14.8 G/DL
MCH: 29.8 PG
MCHC: 34.6 G/DL
MCV: 86.1 FL
PLATELETS: 230 K/UL
POTASSIUM, SERUM: 4.1 MEQ/L
RED BLOOD COUNT: 4.95 X 10-6/U
SODIUM: 140 MEQ/L
WHITE BLOOD COUNT: 10.1 X 10-3/U

## 2017-07-06 ENCOUNTER — OFFICE VISIT (OUTPATIENT)
Dept: CARDIOLOGY CLINIC | Facility: CLINIC | Age: 57
End: 2017-07-06

## 2017-07-06 VITALS
DIASTOLIC BLOOD PRESSURE: 66 MMHG | WEIGHT: 262 LBS | HEART RATE: 80 BPM | BODY MASS INDEX: 31.58 KG/M2 | HEIGHT: 76.5 IN | SYSTOLIC BLOOD PRESSURE: 110 MMHG

## 2017-07-06 DIAGNOSIS — I48.0 PAF (PAROXYSMAL ATRIAL FIBRILLATION) (HCC): Primary | Chronic | ICD-10-CM

## 2017-07-06 DIAGNOSIS — Q21.1 ASD (ATRIAL SEPTAL DEFECT): ICD-10-CM

## 2017-07-06 PROCEDURE — 99212 OFFICE O/P EST SF 10 MIN: CPT | Performed by: INTERNAL MEDICINE

## 2017-07-06 PROCEDURE — 99213 OFFICE O/P EST LOW 20 MIN: CPT | Performed by: INTERNAL MEDICINE

## 2017-07-06 NOTE — PROGRESS NOTES
[facility]    Cardiac Electrophysiology Progress Note        HPI:   Nestor Kline is a 61year old with a history of persistent atrial fibrillation, who I cardioverted on May 18. He had an ASD repair over 2 decades ago.   Since the cardioversion and t well-nourished, in no acute distress   HEENT: Atraumatic. No scleral icterus. Mucous membranes are moist.  Oropharynx is clear. Neck:  JVP is 12 cmH2O. Carotids normal pulses without bruits. Lungs: Clear to auscultation bilaterally.   Cardiac: RRR, nl

## 2017-07-06 NOTE — PATIENT INSTRUCTIONS
- continue aspirin and metoprolol as prescribed  - see Dr Graham Nails in 1 year, or sooner if new concerns

## 2017-07-17 NOTE — OPERATIVE REPORT
[facility]    PATIENT'S NAME: Shruthi Blanc C   ATTENDING PHYSICIAN: Saroj Romeo.  Beny Bertrand MD   OPERATING PHYSICIAN: Marlene Kothari MD   PATIENT ACCOUNT#:   [id number]    LOCATION:  [address] [place] [address] Rd #:   [id number]       DATE OF BIRTH: vision. Fascia closed with Vicryl, skin closed with subcuticular Monocryl, Dermabond, infiltrated have 0.5% Marcaine without. No complications. The patient tolerated the procedure well.     Dictated By Yessica Richey MD  d: 07/17/2017 14:09:06  t: 07/

## 2017-07-18 NOTE — DISCHARGE SUMMARY
[facility]    PATIENT'S NAME: Yaritzagloria Roni MATA   ATTENDING PHYSICIAN: Tomy Carl.  Julia Diamond MD   PATIENT ACCOUNT#:   [id number]    LOCATION:  [address] [place] [address] RECORD #:   [id number]       YOB: 1960  ADMISSION DATE:       06/13/201 Cardizem drip perioperatively. IV Zosyn was also initiated on admission and was continued perioperatively. He was tolerating oral intake. Lopressor 100 mg b.i.d. and aspirin 81 mg was resumed. No postoperative complications were noted.   The patient was

## 2017-10-23 RX ORDER — METOPROLOL TARTRATE 100 MG/1
100 TABLET ORAL
Qty: 60 TABLET | Refills: 1 | Status: SHIPPED | OUTPATIENT
Start: 2017-10-23 | End: 2018-01-10

## 2017-11-13 ENCOUNTER — OFFICE VISIT (OUTPATIENT)
Dept: CARDIOLOGY CLINIC | Facility: CLINIC | Age: 57
End: 2017-11-13

## 2017-11-13 ENCOUNTER — TELEPHONE (OUTPATIENT)
Dept: CARDIOLOGY CLINIC | Facility: CLINIC | Age: 57
End: 2017-11-13

## 2017-11-13 VITALS
WEIGHT: 283 LBS | SYSTOLIC BLOOD PRESSURE: 102 MMHG | BODY MASS INDEX: 34.11 KG/M2 | HEIGHT: 76.5 IN | DIASTOLIC BLOOD PRESSURE: 80 MMHG | RESPIRATION RATE: 18 BRPM | HEART RATE: 72 BPM

## 2017-11-13 DIAGNOSIS — I48.0 PAF (PAROXYSMAL ATRIAL FIBRILLATION) (HCC): Primary | ICD-10-CM

## 2017-11-13 PROCEDURE — 93000 ELECTROCARDIOGRAM COMPLETE: CPT | Performed by: NURSE PRACTITIONER

## 2017-11-13 PROCEDURE — 99212 OFFICE O/P EST SF 10 MIN: CPT | Performed by: NURSE PRACTITIONER

## 2017-11-13 PROCEDURE — 93005 ELECTROCARDIOGRAM TRACING: CPT | Performed by: NURSE PRACTITIONER

## 2017-11-13 PROCEDURE — 99214 OFFICE O/P EST MOD 30 MIN: CPT | Performed by: NURSE PRACTITIONER

## 2017-11-13 NOTE — PROGRESS NOTES
Deshawn Cantrell is a 61year old male. Patient presents with:  Atrial Fibrillation: hx a-fib, ASD, SVT  Palpitations: starting saturday , fluttering feeling in heart, accompanied by dyspnea on exertion    HPI:   Patient comes in today for a checkup for atrial lesions  HEENT: atraumatic, normocephalic,ears and throat are clear  NECK: supple,no adenopathy,no bruits  LUNGS: clear to auscultation  CARDIO: Irregular rate and rhythm GI: good BS's,no masses, HSM or tenderness  EXTREMITIES: no cyanosis, clubbing or laan

## 2017-11-13 NOTE — TELEPHONE ENCOUNTER
Pt states he has noticed since Saturday he feels like his heart is back in atrial fib. Pt states he does have some mild sob and fatigue.  He denies any chest discomfort or swelling  He does not have a blood pressure machine at home and does not know what hi

## 2017-11-14 ENCOUNTER — APPOINTMENT (OUTPATIENT)
Dept: LAB | Age: 57
End: 2017-11-14
Attending: NURSE PRACTITIONER
Payer: COMMERCIAL

## 2017-11-14 DIAGNOSIS — I48.0 PAF (PAROXYSMAL ATRIAL FIBRILLATION) (HCC): ICD-10-CM

## 2017-11-14 PROCEDURE — 85027 COMPLETE CBC AUTOMATED: CPT

## 2017-11-14 PROCEDURE — 80048 BASIC METABOLIC PNL TOTAL CA: CPT

## 2017-11-14 PROCEDURE — 84443 ASSAY THYROID STIM HORMONE: CPT

## 2017-11-14 PROCEDURE — 36415 COLL VENOUS BLD VENIPUNCTURE: CPT

## 2017-11-14 RX ORDER — DIGOXIN 125 MCG
125 TABLET ORAL DAILY
Qty: 30 TABLET | Refills: 1 | Status: SHIPPED | OUTPATIENT
Start: 2017-11-14 | End: 2017-11-14

## 2017-11-14 RX ORDER — DIGOXIN 125 UG/1
TABLET ORAL
Qty: 90 TABLET | Refills: 0 | Status: SHIPPED | OUTPATIENT
Start: 2017-11-14 | End: 2018-01-09

## 2017-12-01 ENCOUNTER — TELEPHONE (OUTPATIENT)
Dept: CARDIOLOGY CLINIC | Facility: CLINIC | Age: 57
End: 2017-12-01

## 2017-12-01 NOTE — TELEPHONE ENCOUNTER
Spoke to pt. Informed per Dante LYON note (Copied and pasted below) pt is to return in a month to see Dr. Cathleen Guzmán to decide on possible cardioversion. Pt verbalized understanding and states has appt scheduled already.  Pt had no further questions at this ti

## 2017-12-21 ENCOUNTER — OFFICE VISIT (OUTPATIENT)
Dept: CARDIOLOGY CLINIC | Facility: CLINIC | Age: 57
End: 2017-12-21

## 2017-12-21 VITALS
DIASTOLIC BLOOD PRESSURE: 70 MMHG | SYSTOLIC BLOOD PRESSURE: 132 MMHG | HEART RATE: 64 BPM | WEIGHT: 278 LBS | BODY MASS INDEX: 33 KG/M2 | RESPIRATION RATE: 16 BRPM

## 2017-12-21 DIAGNOSIS — Q21.1 ASD (ATRIAL SEPTAL DEFECT): ICD-10-CM

## 2017-12-21 DIAGNOSIS — E66.09 OBESITY DUE TO EXCESS CALORIES, UNSPECIFIED CLASSIFICATION, UNSPECIFIED WHETHER SERIOUS COMORBIDITY PRESENT: ICD-10-CM

## 2017-12-21 DIAGNOSIS — I48.19 ATRIAL FIBRILLATION, PERSISTENT (HCC): Primary | ICD-10-CM

## 2017-12-21 PROCEDURE — 99212 OFFICE O/P EST SF 10 MIN: CPT | Performed by: INTERNAL MEDICINE

## 2017-12-21 PROCEDURE — 99215 OFFICE O/P EST HI 40 MIN: CPT | Performed by: INTERNAL MEDICINE

## 2017-12-21 NOTE — PROGRESS NOTES
[facility]    Cardiac Electrophysiology Progress Note        HPI:   Darlyn Shrestha is a 61year old who had atrial fibrillation this spring. I cardioverted him in May. He maintained sinus rhythm, until early November based on new symptoms of fatigue. dysuria or hematuria  NEURO: + rare lightheaded spell with quick mvt; denies headaches, focal weaknesses or paresthesias    PHYSICAL EXAM:   Vital Signs: /70   Pulse 64   Resp 16   Wt 278 lb (126.1 kg)   BMI 33.40 kg/m²   General: Comfortable, well-n

## 2017-12-27 ENCOUNTER — PATIENT MESSAGE (OUTPATIENT)
Dept: CARDIOLOGY CLINIC | Facility: CLINIC | Age: 57
End: 2017-12-27

## 2017-12-28 NOTE — TELEPHONE ENCOUNTER
From: [facility]  To: ULISES Javed  Sent: 12/27/2017 5:01 PM CST  Subject: Non-Urgent Medical Question    Adrien Connolly,  I received a call today that my cardioversion was to be on the 9th at 1pm. The info here says the 19th.   I was told to get a

## 2017-12-28 NOTE — TELEPHONE ENCOUNTER
Spoke to patient and confirmed that cardioversion is scheduled for 1-9-18 at 1pm w/ Dr Kimberly Rivera at [facility]. Patient should following the same instruction as previously given.

## 2017-12-29 RX ORDER — SODIUM CHLORIDE 9 MG/ML
INJECTION, SOLUTION INTRAVENOUS ONCE
Status: DISCONTINUED | OUTPATIENT
Start: 2018-01-09 | End: 2018-01-09

## 2018-01-05 ENCOUNTER — APPOINTMENT (OUTPATIENT)
Dept: LAB | Age: 58
End: 2018-01-05
Attending: INTERNAL MEDICINE
Payer: COMMERCIAL

## 2018-01-05 DIAGNOSIS — I48.19 ATRIAL FIBRILLATION, PERSISTENT (HCC): ICD-10-CM

## 2018-01-05 DIAGNOSIS — E66.09 OBESITY DUE TO EXCESS CALORIES, UNSPECIFIED CLASSIFICATION, UNSPECIFIED WHETHER SERIOUS COMORBIDITY PRESENT: ICD-10-CM

## 2018-01-05 DIAGNOSIS — Q21.1 ASD (ATRIAL SEPTAL DEFECT): ICD-10-CM

## 2018-01-05 LAB
ANION GAP SERPL CALC-SCNC: 10 MMOL/L (ref 0–18)
BUN SERPL-MCNC: 15 MG/DL (ref 8–20)
BUN/CREAT SERPL: 15.5 (ref 10–20)
CALCIUM SERPL-MCNC: 9.3 MG/DL (ref 8.5–10.5)
CHLORIDE SERPL-SCNC: 99 MMOL/L (ref 95–110)
CO2 SERPL-SCNC: 27 MMOL/L (ref 22–32)
CREAT SERPL-MCNC: 0.97 MG/DL (ref 0.5–1.5)
DIGOXIN SERPL-MCNC: <0.2 NG/ML (ref 0.8–2.1)
GLUCOSE SERPL-MCNC: 113 MG/DL (ref 70–99)
MAGNESIUM SERPL-MCNC: 2.3 MG/DL (ref 1.8–2.5)
OSMOLALITY UR CALC.SUM OF ELEC: 284 MOSM/KG (ref 275–295)
POTASSIUM SERPL-SCNC: 4.1 MMOL/L (ref 3.3–5.1)
SODIUM SERPL-SCNC: 136 MMOL/L (ref 136–144)

## 2018-01-05 PROCEDURE — 36415 COLL VENOUS BLD VENIPUNCTURE: CPT

## 2018-01-05 PROCEDURE — 80162 ASSAY OF DIGOXIN TOTAL: CPT

## 2018-01-05 PROCEDURE — 83735 ASSAY OF MAGNESIUM: CPT

## 2018-01-05 PROCEDURE — 80048 BASIC METABOLIC PNL TOTAL CA: CPT

## 2018-01-08 RX ORDER — RIVAROXABAN 20 MG/1
TABLET, FILM COATED ORAL
Qty: 30 TABLET | Refills: 0 | Status: SHIPPED | OUTPATIENT
Start: 2018-01-08 | End: 2018-02-07

## 2018-01-09 ENCOUNTER — ANESTHESIA EVENT (OUTPATIENT)
Dept: INTERVENTIONAL RADIOLOGY/VASCULAR | Facility: HOSPITAL | Age: 58
End: 2018-01-09
Payer: COMMERCIAL

## 2018-01-09 ENCOUNTER — HOSPITAL ENCOUNTER (OUTPATIENT)
Dept: INTERVENTIONAL RADIOLOGY/VASCULAR | Facility: HOSPITAL | Age: 58
Discharge: HOME | End: 2018-01-09
Attending: INTERNAL MEDICINE | Admitting: INTERNAL MEDICINE
Payer: COMMERCIAL

## 2018-01-09 VITALS
DIASTOLIC BLOOD PRESSURE: 80 MMHG | BODY MASS INDEX: 33.49 KG/M2 | WEIGHT: 275 LBS | HEIGHT: 76 IN | OXYGEN SATURATION: 96 % | RESPIRATION RATE: 15 BRPM | HEART RATE: 100 BPM | TEMPERATURE: 98 F | SYSTOLIC BLOOD PRESSURE: 131 MMHG

## 2018-01-09 DIAGNOSIS — I48.91 A-FIB (HCC): ICD-10-CM

## 2018-01-09 PROCEDURE — 93010 ELECTROCARDIOGRAM REPORT: CPT | Performed by: INTERNAL MEDICINE

## 2018-01-09 PROCEDURE — 5A2204Z RESTORATION OF CARDIAC RHYTHM, SINGLE: ICD-10-PCS | Performed by: INTERNAL MEDICINE

## 2018-01-09 PROCEDURE — 92960 CARDIOVERSION ELECTRIC EXT: CPT

## 2018-01-09 PROCEDURE — 93005 ELECTROCARDIOGRAM TRACING: CPT

## 2018-01-09 RX ORDER — SODIUM CHLORIDE, SODIUM LACTATE, POTASSIUM CHLORIDE, CALCIUM CHLORIDE 600; 310; 30; 20 MG/100ML; MG/100ML; MG/100ML; MG/100ML
INJECTION, SOLUTION INTRAVENOUS CONTINUOUS PRN
Status: DISCONTINUED | OUTPATIENT
Start: 2018-01-09 | End: 2018-01-09 | Stop reason: SURG

## 2018-01-09 RX ORDER — SODIUM CHLORIDE 9 MG/ML
INJECTION, SOLUTION INTRAVENOUS
Status: COMPLETED
Start: 2018-01-09 | End: 2018-01-09

## 2018-01-09 RX ORDER — LIDOCAINE HYDROCHLORIDE 10 MG/ML
INJECTION, SOLUTION EPIDURAL; INFILTRATION; INTRACAUDAL; PERINEURAL AS NEEDED
Status: DISCONTINUED | OUTPATIENT
Start: 2018-01-09 | End: 2018-01-09 | Stop reason: SURG

## 2018-01-09 RX ADMIN — SODIUM CHLORIDE: 9 INJECTION, SOLUTION INTRAVENOUS at 13:00:00

## 2018-01-09 RX ADMIN — SODIUM CHLORIDE, SODIUM LACTATE, POTASSIUM CHLORIDE, CALCIUM CHLORIDE: 600; 310; 30; 20 INJECTION, SOLUTION INTRAVENOUS at 13:35:00

## 2018-01-09 RX ADMIN — SODIUM CHLORIDE, SODIUM LACTATE, POTASSIUM CHLORIDE, CALCIUM CHLORIDE: 600; 310; 30; 20 INJECTION, SOLUTION INTRAVENOUS at 13:55:00

## 2018-01-09 RX ADMIN — LIDOCAINE HYDROCHLORIDE 50 MG: 10 INJECTION, SOLUTION EPIDURAL; INFILTRATION; INTRACAUDAL; PERINEURAL at 13:50:00

## 2018-01-09 NOTE — ANESTHESIA PREPROCEDURE EVALUATION
Anesthesia PreOp Note    HPI:     Kasi Cm is a 61year old male who presents for preoperative consultation requested by: * No surgeons listed *    Date of Surgery: 1/9/2018    * No procedures listed *  Indication: * No pre-op diagnosis entered * outpatient prescriptions on file. No Known Allergies    No family history on file.     Social History  Social History   Marital status:   Spouse name: N/A    Years of education: N/A  Number of children: N/A     Occupational History  None on file (+) obese,              Anesthesia Plan:   ASA:  3  Plan:   MAC  Informed Consent Plan and Risks Discussed With:  Patient and spouse  Discussed plan with:  Surgeon      I have informed Magda De Guzman  of the nature of the anesthetic plan, benefits, risks,

## 2018-01-09 NOTE — PROCEDURES
[facility]    Cardiac Electrophysiology Procedure Note    [facility]   [facility] [id number] MRN [id number]   Admission Date 1/9/2018 Procedure Date 1/9/2018   Attending Physician Rachel Yoder MD Procedure Physician Oc

## 2018-01-09 NOTE — ANESTHESIA POSTPROCEDURE EVALUATION
Patient: Dolph [place]    Procedure Summary     Date:  01/09/18 Room / Location:  [place] [address].    Anesthesia Start:  1386 Anesthesia Stop:      Procedure:  EP CARDIOVERSION 1X Diagnosis:  A-fib ([facility] [address]    Scheduled Providers:  Renetta Redmond

## 2018-01-10 RX ORDER — METOPROLOL TARTRATE 100 MG/1
TABLET ORAL
Qty: 60 TABLET | Refills: 0 | OUTPATIENT
Start: 2018-01-10

## 2018-01-10 RX ORDER — METOPROLOL TARTRATE 100 MG/1
100 TABLET ORAL
Qty: 60 TABLET | Refills: 0 | Status: SHIPPED | OUTPATIENT
Start: 2018-01-10 | End: 2018-02-07

## 2018-01-11 RX ORDER — METOPROLOL TARTRATE 100 MG/1
TABLET ORAL
Qty: 180 TABLET | Refills: 0 | OUTPATIENT
Start: 2018-01-11

## 2018-01-11 NOTE — TELEPHONE ENCOUNTER
From: Volodymyr Myrick  Sent: 1/10/2018 12:15 PM CST  Subject: Medication Renewal Request    Volodymyr Myrick would like a refill of the following medications:     Metoprolol Tartrate 100 MG Oral Tab Miguel Villegas NP]   Patient Comment: I have the Xarelto read

## 2018-01-12 ENCOUNTER — TELEPHONE (OUTPATIENT)
Dept: CARDIOLOGY CLINIC | Facility: CLINIC | Age: 58
End: 2018-01-12

## 2018-01-15 NOTE — TELEPHONE ENCOUNTER
S/w I exchange, [employer] to intiate PA for sleep study cpt 48883. Per automated system not PA is required for this test.     Per automated system no copay is required. Pt had an individual deductible of $1000. Pt had met $70 as of today.  Confirmation number

## 2018-01-29 ENCOUNTER — OFFICE VISIT (OUTPATIENT)
Dept: ORTHOPEDICS CLINIC | Facility: CLINIC | Age: 58
End: 2018-01-29

## 2018-01-29 ENCOUNTER — HOSPITAL ENCOUNTER (OUTPATIENT)
Dept: GENERAL RADIOLOGY | Facility: HOSPITAL | Age: 58
Discharge: HOME | End: 2018-01-29
Attending: ORTHOPAEDIC SURGERY
Payer: COMMERCIAL

## 2018-01-29 DIAGNOSIS — M17.11 PRIMARY OSTEOARTHRITIS OF RIGHT KNEE: ICD-10-CM

## 2018-01-29 DIAGNOSIS — R52 PAIN: ICD-10-CM

## 2018-01-29 DIAGNOSIS — M17.12 PRIMARY OSTEOARTHRITIS OF LEFT KNEE: Primary | ICD-10-CM

## 2018-01-29 PROCEDURE — 73562 X-RAY EXAM OF KNEE 3: CPT | Performed by: ORTHOPAEDIC SURGERY

## 2018-01-29 PROCEDURE — 99203 OFFICE O/P NEW LOW 30 MIN: CPT | Performed by: ORTHOPAEDIC SURGERY

## 2018-01-29 PROCEDURE — 99212 OFFICE O/P EST SF 10 MIN: CPT | Performed by: ORTHOPAEDIC SURGERY

## 2018-01-29 NOTE — PROGRESS NOTES
Patient is a 62-year-old male who presents with left greater than right knee pain. It has been with him for the last 6 months. It is bothersome when he is trying to do prolonged walking or aerobic activities. Everyday activities is not that pronounced. sensorimotor findings in lower extremities is normal    Bilateral knee exam she will flexion full to 125 or greater extensions full there is a little varus alignment correctable to neutral he has some complaints medially in the left knee not on the right k on.  We will see him back as needed

## 2018-02-06 ENCOUNTER — OFFICE VISIT (OUTPATIENT)
Dept: SLEEP CENTER | Age: 58
End: 2018-02-06
Attending: INTERNAL MEDICINE
Payer: COMMERCIAL

## 2018-02-06 DIAGNOSIS — Z76.89 SLEEP CONCERN: Primary | ICD-10-CM

## 2018-02-06 PROCEDURE — 95810 POLYSOM 6/> YRS 4/> PARAM: CPT

## 2018-02-07 RX ORDER — METOPROLOL TARTRATE 100 MG/1
TABLET ORAL
Qty: 60 TABLET | Refills: 0 | Status: SHIPPED | OUTPATIENT
Start: 2018-02-07 | End: 2018-03-06

## 2018-02-07 NOTE — PROCEDURES
[facility]  Accredited by the [place] of Sleep [employer] (AASM)    PATIENT'S NAME: Gregory Morocho   ATTENDING PHYSICIAN: Dylan Pizarro MD   REFERRING PHYSICIAN: Dylan Pizarro MD   PATIENT ACCOUNT #: [id number] LOCATION: Sleep Center    509 periodic limb movements for a periodic limb movement index of 96 events per hour of which 4 per hour were associated with arousal.  The lowest desaturation was to 87%.   The average heart rate is 45 beats per minute and there was no significant asystole

## 2018-02-07 NOTE — TELEPHONE ENCOUNTER
From: Devorah Love  Sent: 2/7/2018 3:48 PM CST  Subject: Medication Renewal Request    Devorah Love would like a refill of the following medications:     Rivaroxaban (XARELTO) 20 MG Oral Tab GUILLERMO JorgeN]   Patient Comment:  This perscription w

## 2018-03-06 ENCOUNTER — OFFICE VISIT (OUTPATIENT)
Dept: INTERNAL MEDICINE CLINIC | Facility: CLINIC | Age: 58
End: 2018-03-06

## 2018-03-06 VITALS
DIASTOLIC BLOOD PRESSURE: 84 MMHG | SYSTOLIC BLOOD PRESSURE: 124 MMHG | HEIGHT: 76.5 IN | TEMPERATURE: 98 F | BODY MASS INDEX: 33.02 KG/M2 | HEART RATE: 64 BPM | RESPIRATION RATE: 20 BRPM | WEIGHT: 274 LBS

## 2018-03-06 DIAGNOSIS — Z12.11 COLON CANCER SCREENING: ICD-10-CM

## 2018-03-06 DIAGNOSIS — I34.0 MITRAL VALVE INSUFFICIENCY, UNSPECIFIED ETIOLOGY: ICD-10-CM

## 2018-03-06 DIAGNOSIS — Z00.00 ROUTINE PHYSICAL EXAMINATION: Primary | ICD-10-CM

## 2018-03-06 DIAGNOSIS — I48.0 PAF (PAROXYSMAL ATRIAL FIBRILLATION) (HCC): Chronic | ICD-10-CM

## 2018-03-06 PROBLEM — K81.0 ACUTE CHOLECYSTITIS: Chronic | Status: RESOLVED | Noted: 2017-06-13 | Resolved: 2018-03-06

## 2018-03-06 PROBLEM — I48.91 ATRIAL FIBRILLATION WITH RAPID VENTRICULAR RESPONSE (HCC): Status: RESOLVED | Noted: 2017-04-18 | Resolved: 2018-03-06

## 2018-03-06 PROBLEM — R73.9 HYPERGLYCEMIA: Status: RESOLVED | Noted: 2017-06-13 | Resolved: 2018-03-06

## 2018-03-06 PROBLEM — R42 DIZZINESS: Status: RESOLVED | Noted: 2017-04-26 | Resolved: 2018-03-06

## 2018-03-06 PROBLEM — E87.1 HYPONATREMIA: Status: RESOLVED | Noted: 2017-06-13 | Resolved: 2018-03-06

## 2018-03-06 PROBLEM — R07.9 CHEST PAIN: Status: RESOLVED | Noted: 2017-04-26 | Resolved: 2018-03-06

## 2018-03-06 PROBLEM — E87.2 METABOLIC ACIDOSIS: Status: RESOLVED | Noted: 2017-06-13 | Resolved: 2018-03-06

## 2018-03-06 PROBLEM — R00.2 PALPITATIONS: Status: RESOLVED | Noted: 2017-05-08 | Resolved: 2018-03-06

## 2018-03-06 PROBLEM — E87.20 METABOLIC ACIDOSIS: Status: RESOLVED | Noted: 2017-06-13 | Resolved: 2018-03-06

## 2018-03-06 PROCEDURE — 99386 PREV VISIT NEW AGE 40-64: CPT | Performed by: INTERNAL MEDICINE

## 2018-03-06 RX ORDER — METOPROLOL TARTRATE 100 MG/1
100 TABLET ORAL 2 TIMES DAILY
Qty: 60 TABLET | Refills: 5 | Status: SHIPPED | OUTPATIENT
Start: 2018-03-06 | End: 2018-09-11

## 2018-03-06 NOTE — PROGRESS NOTES
HPI:    Patient ID: Nestor Kline is a 61year old male. HPI  Patient is here to establish care and for physical exam.  Past medical history reviewed and entered as below. History of atrial septal defect repair many years ago.   Last year in April he pr Relation Age of Onset   • Heart Disorder Mother 28     Heart attack      Smoking status: Never Smoker                                                              Smokeless tobacco: Never Used                      Alcohol use:  Yes                    Review sounds are normal. He exhibits no mass. There is no tenderness. Hernia confirmed negative in the right inguinal area and confirmed negative in the left inguinal area.    Genitourinary: Rectum normal, prostate normal, testes normal and penis normal. Circumci

## 2018-03-08 ENCOUNTER — APPOINTMENT (OUTPATIENT)
Dept: LAB | Age: 58
End: 2018-03-08
Attending: INTERNAL MEDICINE
Payer: COMMERCIAL

## 2018-03-08 DIAGNOSIS — Z00.00 ROUTINE PHYSICAL EXAMINATION: ICD-10-CM

## 2018-03-08 LAB
ALBUMIN SERPL BCP-MCNC: 4.5 G/DL (ref 3.5–4.8)
ALBUMIN/GLOB SERPL: 1.7 {RATIO} (ref 1–2)
ALP SERPL-CCNC: 56 U/L (ref 32–100)
ALT SERPL-CCNC: 39 U/L (ref 17–63)
ANION GAP SERPL CALC-SCNC: 7 MMOL/L (ref 0–18)
AST SERPL-CCNC: 29 U/L (ref 15–41)
BILIRUB SERPL-MCNC: 1.1 MG/DL (ref 0.3–1.2)
BUN SERPL-MCNC: 12 MG/DL (ref 8–20)
BUN/CREAT SERPL: 14 (ref 10–20)
CALCIUM SERPL-MCNC: 9.3 MG/DL (ref 8.5–10.5)
CHLORIDE SERPL-SCNC: 104 MMOL/L (ref 95–110)
CHOLEST SERPL-MCNC: 230 MG/DL (ref 110–200)
CO2 SERPL-SCNC: 28 MMOL/L (ref 22–32)
CREAT SERPL-MCNC: 0.86 MG/DL (ref 0.5–1.5)
GLOBULIN PLAS-MCNC: 2.7 G/DL (ref 2.5–3.7)
GLUCOSE SERPL-MCNC: 106 MG/DL (ref 70–99)
HDLC SERPL-MCNC: 38 MG/DL
LDLC SERPL CALC-MCNC: 161 MG/DL (ref 0–99)
NONHDLC SERPL-MCNC: 192 MG/DL
OSMOLALITY UR CALC.SUM OF ELEC: 288 MOSM/KG (ref 275–295)
PATIENT FASTING: YES
POTASSIUM SERPL-SCNC: 4.2 MMOL/L (ref 3.3–5.1)
PROT SERPL-MCNC: 7.2 G/DL (ref 5.9–8.4)
PSA SERPL-MCNC: 0.4 NG/ML (ref 0–4)
SODIUM SERPL-SCNC: 139 MMOL/L (ref 136–144)
TRIGL SERPL-MCNC: 157 MG/DL (ref 1–149)
VIT B12 SERPL-MCNC: 430 PG/ML (ref 181–914)

## 2018-03-08 PROCEDURE — 82607 VITAMIN B-12: CPT

## 2018-03-08 PROCEDURE — 36415 COLL VENOUS BLD VENIPUNCTURE: CPT

## 2018-03-08 PROCEDURE — 80061 LIPID PANEL: CPT

## 2018-03-08 PROCEDURE — 80053 COMPREHEN METABOLIC PANEL: CPT

## 2018-03-08 RX ORDER — RIVAROXABAN 20 MG/1
TABLET, FILM COATED ORAL
Qty: 30 TABLET | Refills: 0 | Status: SHIPPED | OUTPATIENT
Start: 2018-03-08 | End: 2018-04-05

## 2018-04-05 ENCOUNTER — OFFICE VISIT (OUTPATIENT)
Dept: CARDIOLOGY CLINIC | Facility: CLINIC | Age: 58
End: 2018-04-05

## 2018-04-05 VITALS
BODY MASS INDEX: 32 KG/M2 | WEIGHT: 270 LBS | SYSTOLIC BLOOD PRESSURE: 108 MMHG | RESPIRATION RATE: 16 BRPM | HEART RATE: 60 BPM | DIASTOLIC BLOOD PRESSURE: 70 MMHG

## 2018-04-05 DIAGNOSIS — I48.0 PAF (PAROXYSMAL ATRIAL FIBRILLATION) (HCC): Primary | Chronic | ICD-10-CM

## 2018-04-05 DIAGNOSIS — Q21.1 ASD (ATRIAL SEPTAL DEFECT): ICD-10-CM

## 2018-04-05 PROBLEM — I48.19 ATRIAL FIBRILLATION, PERSISTENT (HCC): Status: RESOLVED | Noted: 2017-05-08 | Resolved: 2018-04-05

## 2018-04-05 PROCEDURE — 99214 OFFICE O/P EST MOD 30 MIN: CPT | Performed by: INTERNAL MEDICINE

## 2018-04-05 PROCEDURE — 99212 OFFICE O/P EST SF 10 MIN: CPT | Performed by: INTERNAL MEDICINE

## 2018-04-05 RX ORDER — ASPIRIN 81 MG/1
81 TABLET, CHEWABLE ORAL DAILY
Qty: 90 TABLET | Refills: 3 | Status: ON HOLD | OUTPATIENT
Start: 2018-04-05 | End: 2020-09-02

## 2018-04-05 NOTE — PROGRESS NOTES
[facility]    Cardiac Electrophysiology Progress Note        HPI:   Ced Morley is a 61year old with history of ASD and rare episodes of persistent atrial fibrillation.   I cardioverted him in May 2017, he had a recurrence in November, and I cardiov rashes  RESPIRATORY: denies shortness of breath with exertion  CARDIOVASCULAR: see HPI  GI: denies abdominal pain and denies heartburn  : no dysuria or hematuria  NEURO: denies headaches, focal weaknesses or paresthesias    PHYSICAL EXAM:   Vital Signs:

## 2018-04-05 NOTE — PATIENT INSTRUCTIONS
- stop Xarelto now  - continue aspirin and metoprolol  - follow-up with Dr Renee Burnett in 6 months

## 2018-07-19 ENCOUNTER — OFFICE VISIT (OUTPATIENT)
Dept: CARDIOLOGY CLINIC | Facility: CLINIC | Age: 58
End: 2018-07-19
Payer: COMMERCIAL

## 2018-07-19 VITALS
HEART RATE: 64 BPM | WEIGHT: 266 LBS | SYSTOLIC BLOOD PRESSURE: 104 MMHG | RESPIRATION RATE: 16 BRPM | DIASTOLIC BLOOD PRESSURE: 64 MMHG | BODY MASS INDEX: 32 KG/M2

## 2018-07-19 DIAGNOSIS — E66.09 OBESITY DUE TO EXCESS CALORIES, UNSPECIFIED CLASSIFICATION, UNSPECIFIED WHETHER SERIOUS COMORBIDITY PRESENT: ICD-10-CM

## 2018-07-19 DIAGNOSIS — Q21.1 ASD (ATRIAL SEPTAL DEFECT): ICD-10-CM

## 2018-07-19 DIAGNOSIS — I48.0 PAF (PAROXYSMAL ATRIAL FIBRILLATION) (HCC): Primary | Chronic | ICD-10-CM

## 2018-07-19 PROCEDURE — 99212 OFFICE O/P EST SF 10 MIN: CPT | Performed by: INTERNAL MEDICINE

## 2018-07-19 PROCEDURE — 99213 OFFICE O/P EST LOW 20 MIN: CPT | Performed by: INTERNAL MEDICINE

## 2018-07-19 NOTE — PATIENT INSTRUCTIONS
- make efforts to lose weight, aiming for 2-3 pounds a month  - continue aspirin and metoprolol  - see Dr Isaac Garcia 91 in 1 year

## 2018-07-19 NOTE — PROGRESS NOTES
[facility]    Cardiac Electrophysiology Progress Note  7/19/2018      HPI:   Fish Penaloza is a 60year old here to follow-up his atrial fibrillation and cardiac risk factors. Since our visit in the spring, he has done well.   There have been no new s exertion  CARDIOVASCULAR: see HPI  GI: denies abdominal pain and denies heartburn  : no dysuria or hematuria  NEURO: denies headaches, focal weaknesses or paresthesias    PHYSICAL EXAM:   Vital Signs: /64   Pulse 64   Resp 16   Wt 266 lb (120.7 kg) 2-3 pounds every month by beginning aerobic exercise for 30 minutes a day and limiting calories. - Follow-up with Dr. Amber Helms in 1 year. - If cholesterol not improved with lifestyle modification by then, we will need to begin medication rx.     Crystal ePrez,

## 2018-08-13 ENCOUNTER — OFFICE VISIT (OUTPATIENT)
Dept: ORTHOPEDICS CLINIC | Facility: CLINIC | Age: 58
End: 2018-08-13
Payer: COMMERCIAL

## 2018-08-13 VITALS — WEIGHT: 265 LBS | BODY MASS INDEX: 32.27 KG/M2 | HEIGHT: 76 IN

## 2018-08-13 DIAGNOSIS — M17.12 PRIMARY OSTEOARTHRITIS OF LEFT KNEE: Primary | ICD-10-CM

## 2018-08-13 DIAGNOSIS — M17.11 PRIMARY OSTEOARTHRITIS OF RIGHT KNEE: ICD-10-CM

## 2018-08-13 PROCEDURE — 99213 OFFICE O/P EST LOW 20 MIN: CPT | Performed by: ORTHOPAEDIC SURGERY

## 2018-08-13 PROCEDURE — 99212 OFFICE O/P EST SF 10 MIN: CPT | Performed by: ORTHOPAEDIC SURGERY

## 2018-08-13 RX ORDER — MELOXICAM 15 MG/1
TABLET ORAL
Qty: 90 TABLET | Refills: 1 | OUTPATIENT
Start: 2018-08-13

## 2018-08-13 RX ORDER — MELOXICAM 15 MG/1
15 TABLET ORAL
Qty: 30 TABLET | Refills: 1 | Status: SHIPPED | OUTPATIENT
Start: 2018-08-13 | End: 2018-10-11

## 2018-08-13 NOTE — TELEPHONE ENCOUNTER
Pt notified.  He states he didn't request for 90days and it must have been the pharmacy doing an automatic request.

## 2018-08-13 NOTE — TELEPHONE ENCOUNTER
Only want to rx 30 days to see if tolerates ( may need to change) and if on long term will turn over to PCP ---first let's see if it helps

## 2018-08-13 NOTE — PROGRESS NOTES
Patient presents for follow-up. Since last seen he is now off Xarelto he states he has been cardioverted and is no longer requiring that medication.   He is also seeing a primary care physician and has several medical workups in the making that includes a GI upset and if it causes any such complaints he should stop it right away notify us.   In addition long-term use can cause kidney liver issues but it is not typical    We are going to try over the next month and see him back if it is symptomatic we can inj

## 2018-09-12 RX ORDER — METOPROLOL TARTRATE 100 MG/1
TABLET ORAL
Qty: 60 TABLET | Refills: 1 | Status: SHIPPED | OUTPATIENT
Start: 2018-09-12 | End: 2018-11-18

## 2018-09-14 NOTE — TELEPHONE ENCOUNTER
Sounds like pt picks up the phone but doesn't say anything. CSS, if pt calls back pls inform him of msg below.

## 2018-10-11 ENCOUNTER — OFFICE VISIT (OUTPATIENT)
Dept: INTERNAL MEDICINE CLINIC | Facility: CLINIC | Age: 58
End: 2018-10-11
Payer: COMMERCIAL

## 2018-10-11 VITALS
BODY MASS INDEX: 34.22 KG/M2 | HEART RATE: 72 BPM | WEIGHT: 281 LBS | DIASTOLIC BLOOD PRESSURE: 80 MMHG | HEIGHT: 76 IN | SYSTOLIC BLOOD PRESSURE: 124 MMHG | RESPIRATION RATE: 16 BRPM

## 2018-10-11 DIAGNOSIS — R20.0 NUMBNESS AND TINGLING OF FOOT: ICD-10-CM

## 2018-10-11 DIAGNOSIS — I48.0 PAF (PAROXYSMAL ATRIAL FIBRILLATION) (HCC): ICD-10-CM

## 2018-10-11 DIAGNOSIS — M17.12 OSTEOARTHRITIS OF LEFT KNEE, UNSPECIFIED OSTEOARTHRITIS TYPE: ICD-10-CM

## 2018-10-11 DIAGNOSIS — E78.5 HYPERLIPIDEMIA, UNSPECIFIED HYPERLIPIDEMIA TYPE: Primary | ICD-10-CM

## 2018-10-11 DIAGNOSIS — R20.2 NUMBNESS AND TINGLING OF FOOT: ICD-10-CM

## 2018-10-11 PROCEDURE — 99214 OFFICE O/P EST MOD 30 MIN: CPT | Performed by: INTERNAL MEDICINE

## 2018-10-11 PROCEDURE — 99212 OFFICE O/P EST SF 10 MIN: CPT | Performed by: INTERNAL MEDICINE

## 2018-10-11 NOTE — PROGRESS NOTES
HPI:    Patient ID: Tracy Jordan is a 60year old male. HPI  Patient is here for follow-up on medical issues including hyperlipidemia and paroxysmal atrial fibrillation.   Last seen here in March for physical exam.  Blood work at that time her cholester Disorder Mother 28        Heart attack   • Cancer Father       Social History    Tobacco Use      Smoking status: Never Smoker      Smokeless tobacco: Never Used    Alcohol use: Yes    Drug use: No         Review of Systems   Constitutional: Negative for c is no tenderness. Musculoskeletal: He exhibits no tenderness. Lymphadenopathy:     He has no cervical adenopathy. Neurological: He is alert. A sensory deficit is present. Significant decrease in sensation to monofilament testing on both feet.   Kelli

## 2018-10-12 NOTE — TELEPHONE ENCOUNTER
Dr. Oscar Sawyer, [address] is requesting refill of Meloxicam 15 mg tablets # 30 and NRF. LOV with you for bilateral knee pain was 08/13/18  Rx last ordered on 08/13/18.    Please sign if you approve refill   Thank you

## 2018-10-21 RX ORDER — MELOXICAM 15 MG/1
TABLET ORAL
Qty: 30 TABLET | Refills: 0 | Status: SHIPPED | OUTPATIENT
Start: 2018-10-21 | End: 2018-11-29

## 2018-10-29 ENCOUNTER — PROCEDURE VISIT (OUTPATIENT)
Dept: NEUROLOGY | Facility: CLINIC | Age: 58
End: 2018-10-29
Payer: COMMERCIAL

## 2018-10-29 DIAGNOSIS — R20.0 NUMBNESS IN FEET: Primary | ICD-10-CM

## 2018-10-29 PROCEDURE — 95886 MUSC TEST DONE W/N TEST COMP: CPT | Performed by: PHYSICAL MEDICINE & REHABILITATION

## 2018-10-29 PROCEDURE — 95911 NRV CNDJ TEST 9-10 STUDIES: CPT | Performed by: PHYSICAL MEDICINE & REHABILITATION

## 2018-10-29 NOTE — PROCEDURES
[address]   Nerve Conduction Study and Electromyography Procedure Note    Patient: Milagros Andersen Hand Dominance: right  Patient ID: [id number] Referring Dr: Esteban Timmons  Sex: Male Test Dr: Candie Rosado  Date of Birth: [birth year]    R Tibial 41.20 Yes   L Tibial 41.15 Yes       EMG Summary Table     Spontaneous MUAP Recruitment   Muscle Nerve Roots IA Fib PSW Fasc H.F. Amp Dur. PPP Pattern   L.  Lumbar paraspinals (low)  - N None None None None N N N N   L. Lumbar paraspinals (m H-Reflex:  ? The H reflex study were present and prolonged at the R Tibial and L Tibial      Electromyography  The needle EMG examination was performed in 14 muscles. It was normal in 10 muscle(s): L. Lumbar paraspinals (low), L.  Lumbar paraspinals (mid),

## 2018-11-01 ENCOUNTER — NURSE ONLY (OUTPATIENT)
Dept: GASTROENTEROLOGY | Facility: CLINIC | Age: 58
End: 2018-11-01

## 2018-11-01 DIAGNOSIS — Z12.11 COLON CANCER SCREENING: Primary | ICD-10-CM

## 2018-11-01 NOTE — PROGRESS NOTES
[address]- 6' 5 / 260 lbs  Allergies-NKA  Med review- med review done with patient on 11/1/18   Herbals- NO   OTC- NO   Iron- NO   Blood Thinners- NO   DM meds- NO  Provider Preference- N/A  Sleep Apena- NO  Pacemaker

## 2018-11-08 NOTE — PROGRESS NOTES
Scheduled for:  Colonoscopy - [address]  Provider Name:  Dr. Felisha Rose  Date:  1/25/19  Location:  [facility]  Sedation:  MAC  Time:  10:00 am (pt is aware to arrive at 9:00 am)  Prep:  Colyte, Prep instructions were given to pt over the phone, pt verbalized understandin

## 2018-11-18 RX ORDER — METOPROLOL TARTRATE 100 MG/1
TABLET ORAL
Qty: 60 TABLET | Refills: 0 | Status: SHIPPED | OUTPATIENT
Start: 2018-11-18 | End: 2018-12-23

## 2018-11-29 ENCOUNTER — OFFICE VISIT (OUTPATIENT)
Dept: INTERNAL MEDICINE CLINIC | Facility: CLINIC | Age: 58
End: 2018-11-29
Payer: COMMERCIAL

## 2018-11-29 ENCOUNTER — LAB ENCOUNTER (OUTPATIENT)
Dept: LAB | Facility: HOSPITAL | Age: 58
End: 2018-11-29
Attending: INTERNAL MEDICINE
Payer: COMMERCIAL

## 2018-11-29 VITALS
DIASTOLIC BLOOD PRESSURE: 70 MMHG | SYSTOLIC BLOOD PRESSURE: 130 MMHG | TEMPERATURE: 98 F | BODY MASS INDEX: 33.49 KG/M2 | RESPIRATION RATE: 20 BRPM | WEIGHT: 275 LBS | HEART RATE: 62 BPM | HEIGHT: 76 IN

## 2018-11-29 DIAGNOSIS — G62.9 PERIPHERAL POLYNEUROPATHY: ICD-10-CM

## 2018-11-29 DIAGNOSIS — M51.9 LUMBAR DISC DISEASE: ICD-10-CM

## 2018-11-29 DIAGNOSIS — G62.9 PERIPHERAL POLYNEUROPATHY: Primary | ICD-10-CM

## 2018-11-29 PROCEDURE — 84156 ASSAY OF PROTEIN URINE: CPT

## 2018-11-29 PROCEDURE — 87340 HEPATITIS B SURFACE AG IA: CPT

## 2018-11-29 PROCEDURE — 86038 ANTINUCLEAR ANTIBODIES: CPT

## 2018-11-29 PROCEDURE — 86335 IMMUNFIX E-PHORSIS/URINE/CSF: CPT

## 2018-11-29 PROCEDURE — 99212 OFFICE O/P EST SF 10 MIN: CPT | Performed by: INTERNAL MEDICINE

## 2018-11-29 PROCEDURE — 86803 HEPATITIS C AB TEST: CPT

## 2018-11-29 PROCEDURE — 86706 HEP B SURFACE ANTIBODY: CPT

## 2018-11-29 PROCEDURE — 87389 HIV-1 AG W/HIV-1&-2 AB AG IA: CPT

## 2018-11-29 PROCEDURE — 86704 HEP B CORE ANTIBODY TOTAL: CPT

## 2018-11-29 PROCEDURE — 99213 OFFICE O/P EST LOW 20 MIN: CPT | Performed by: INTERNAL MEDICINE

## 2018-11-29 PROCEDURE — 36415 COLL VENOUS BLD VENIPUNCTURE: CPT

## 2018-11-29 PROCEDURE — 80500 HEPATITIS B PROFILE: CPT

## 2018-11-29 PROCEDURE — 85652 RBC SED RATE AUTOMATED: CPT

## 2018-11-29 PROCEDURE — 84166 PROTEIN E-PHORESIS/URINE/CSF: CPT

## 2018-11-29 PROCEDURE — 83883 ASSAY NEPHELOMETRY NOT SPEC: CPT

## 2018-11-29 PROCEDURE — 84165 PROTEIN E-PHORESIS SERUM: CPT

## 2018-11-29 NOTE — PROGRESS NOTES
HPI:    Patient ID: Asher Connelly is a 60year old male. HPI  Patient is here for follow-up on possible neuropathy issues. Last seen here in October. Underlying hyperlipidemia. Patient was sent for EMG nerve conduction study.   He complains of feet fe Respiratory: Negative for cough and shortness of breath. Cardiovascular: Negative for chest pain and palpitations. Gastrointestinal: Negative for nausea, vomiting, abdominal pain, diarrhea and constipation.    Genitourinary: Negative for dysuria, hem ordered in this encounter       Imaging & Referrals:  None         ID#[id number]

## 2018-11-30 ENCOUNTER — TELEPHONE (OUTPATIENT)
Dept: OTHER | Age: 58
End: 2018-11-30

## 2018-11-30 DIAGNOSIS — G62.9 PERIPHERAL POLYNEUROPATHY: Primary | ICD-10-CM

## 2018-11-30 NOTE — TELEPHONE ENCOUNTER
Received a call from Reyes Barraza from the [facility] who is following up on the pending Bence Jones protein order that needs to be signed. Jeromy reports the lab can not be processed if the order is not signed. Message routed to provider review.

## 2018-11-30 NOTE — TELEPHONE ENCOUNTER
Jeromy from the lab called to stated the Bence Nolasco protein ordered om 11/19/18 was sent for outsource. The lab would like it to be changed to [place] [facility]. Lab test [id number]. The lab is pended we just need a diagnostic code.

## 2018-12-24 RX ORDER — METOPROLOL TARTRATE 100 MG/1
TABLET ORAL
Qty: 60 TABLET | Refills: 0 | Status: SHIPPED | OUTPATIENT
Start: 2018-12-24 | End: 2019-01-22

## 2018-12-24 NOTE — TELEPHONE ENCOUNTER
Refill passed per [facility] protocol.   Hypertensive Medications  Protocol Criteria:  · Appointment scheduled in the past 6 months or in the next 3 months  · BMP or CMP in the past 12 months  · Creatinine result < 2  Recent Outpatient Visits

## 2019-01-15 ENCOUNTER — OFFICE VISIT (OUTPATIENT)
Dept: NEUROLOGY | Facility: CLINIC | Age: 59
End: 2019-01-15
Payer: COMMERCIAL

## 2019-01-15 VITALS
SYSTOLIC BLOOD PRESSURE: 110 MMHG | HEIGHT: 76 IN | DIASTOLIC BLOOD PRESSURE: 70 MMHG | RESPIRATION RATE: 16 BRPM | WEIGHT: 275 LBS | BODY MASS INDEX: 33.49 KG/M2 | HEART RATE: 62 BPM

## 2019-01-15 DIAGNOSIS — G62.9 DEMYELINATING NEUROPATHY: Primary | ICD-10-CM

## 2019-01-15 PROCEDURE — 99244 OFF/OP CNSLTJ NEW/EST MOD 40: CPT | Performed by: OTHER

## 2019-01-15 NOTE — PATIENT INSTRUCTIONS
Refill policies:    • Allow 2-3 business days for refills; controlled substances may take longer.   • Contact your pharmacy at least 5 days prior to running out of medication and have them send an electronic request or submit request through the “request re entire amount billed. Precertification and Prior Authorizations: If your physician has recommended that you have a procedure or additional testing performed.   [facility]) will contact your insurance carrier to obtain pre-certi

## 2019-01-15 NOTE — H&P
[facility]   Neurology; INITIAL CLINIC VISIT  1/15/2019, 11:27 AM     Darlyn Shrestha Patient Status:  No patient class for patient encounter    1960 MRN [id number]   Location [address] [place] [address] Attending No att. providers the counter brand, Disp: 90 tablet, Rfl: 3  •  PEG 3350-KCl-NaBcb-NaCl-NaSulf (COLYTE WITH FLAVOR PACKS) 240 g Oral Recon Soln, As directed per GI consult notes, Disp: 1 Bottle, Rfl: 0      REVIEW OF SYSTEMS:  A comprehensive 10 point review of systems was MD  Vascular & General Neurology  Director, Multiple Sclerosis Program  [facility]  1/15/2019, Time completed 11:27 AM

## 2019-01-16 ENCOUNTER — LAB ENCOUNTER (OUTPATIENT)
Dept: LAB | Age: 59
End: 2019-01-16
Attending: INTERNAL MEDICINE
Payer: COMMERCIAL

## 2019-01-16 DIAGNOSIS — G62.9 DEMYELINATING NEUROPATHY: Primary | ICD-10-CM

## 2019-01-16 DIAGNOSIS — E78.5 HYPERLIPIDEMIA, UNSPECIFIED HYPERLIPIDEMIA TYPE: ICD-10-CM

## 2019-01-16 LAB
CHOLEST SERPL-MCNC: 243 MG/DL (ref 110–200)
CRP SERPL-MCNC: <0.5 MG/DL (ref 0–0.9)
ERYTHROCYTE [SEDIMENTATION RATE] IN BLOOD: 5 MM/HR (ref 0–20)
GLUCOSE SERPL-MCNC: 106 MG/DL (ref 70–99)
HDLC SERPL-MCNC: 41 MG/DL
LDLC SERPL CALC-MCNC: 175 MG/DL (ref 0–99)
NONHDLC SERPL-MCNC: 202 MG/DL
RHEUMATOID FACT SER QL: <5 IU/ML
T4 FREE SERPL-MCNC: 0.84 NG/DL (ref 0.58–1.64)
TRIGL SERPL-MCNC: 136 MG/DL (ref 1–149)
TSH SERPL-ACNC: 1.64 UIU/ML (ref 0.45–5.33)
VIT B12 SERPL-MCNC: 411 PG/ML (ref 181–914)

## 2019-01-16 PROCEDURE — 84165 PROTEIN E-PHORESIS SERUM: CPT

## 2019-01-16 PROCEDURE — 36415 COLL VENOUS BLD VENIPUNCTURE: CPT

## 2019-01-16 PROCEDURE — 82947 ASSAY GLUCOSE BLOOD QUANT: CPT

## 2019-01-16 PROCEDURE — 84443 ASSAY THYROID STIM HORMONE: CPT

## 2019-01-16 PROCEDURE — 82607 VITAMIN B-12: CPT

## 2019-01-16 PROCEDURE — 86140 C-REACTIVE PROTEIN: CPT

## 2019-01-16 PROCEDURE — 84439 ASSAY OF FREE THYROXINE: CPT

## 2019-01-16 PROCEDURE — 86431 RHEUMATOID FACTOR QUANT: CPT

## 2019-01-16 PROCEDURE — 86334 IMMUNOFIX E-PHORESIS SERUM: CPT

## 2019-01-16 PROCEDURE — 86038 ANTINUCLEAR ANTIBODIES: CPT

## 2019-01-16 PROCEDURE — 80061 LIPID PANEL: CPT

## 2019-01-16 PROCEDURE — 85652 RBC SED RATE AUTOMATED: CPT

## 2019-01-17 ENCOUNTER — TELEPHONE (OUTPATIENT)
Dept: NEUROLOGY | Facility: CLINIC | Age: 59
End: 2019-01-17

## 2019-01-17 DIAGNOSIS — G62.9 DEMYELINATING NEUROPATHY: Primary | ICD-10-CM

## 2019-01-21 LAB — NUCLEAR IGG TITR SER IF: NEGATIVE {TITER}

## 2019-01-22 LAB
ALBUMIN SERPL ELPH-MCNC: 4.92 G/DL (ref 3.75–5.21)
ALBUMIN/GLOB SERPL: 1.84 {RATIO} (ref 1–2)
ALPHA1 GLOB SERPL ELPH-MCNC: 0.27 G/DL (ref 0.19–0.46)
ALPHA2 GLOB SERPL ELPH-MCNC: 0.65 G/DL (ref 0.48–1.05)
B-GLOBULIN SERPL ELPH-MCNC: 0.85 G/DL (ref 0.68–1.23)
GAMMA GLOB SERPL ELPH-MCNC: 0.9 G/DL (ref 0.62–1.7)
TOTAL PROTEIN (SPECIAL TESTING): 7.6 G/DL (ref 6.5–9.1)

## 2019-01-22 RX ORDER — METOPROLOL TARTRATE 100 MG/1
TABLET ORAL
Qty: 60 TABLET | Refills: 2 | Status: SHIPPED | OUTPATIENT
Start: 2019-01-22 | End: 2019-04-23

## 2019-01-25 ENCOUNTER — ANESTHESIA (OUTPATIENT)
Dept: ENDOSCOPY | Facility: HOSPITAL | Age: 59
End: 2019-01-25
Payer: COMMERCIAL

## 2019-01-25 ENCOUNTER — HOSPITAL ENCOUNTER (OUTPATIENT)
Facility: HOSPITAL | Age: 59
Setting detail: HOSPITAL OUTPATIENT SURGERY
Discharge: HOME | End: 2019-01-25
Attending: INTERNAL MEDICINE | Admitting: INTERNAL MEDICINE
Payer: COMMERCIAL

## 2019-01-25 ENCOUNTER — ANESTHESIA EVENT (OUTPATIENT)
Dept: ENDOSCOPY | Facility: HOSPITAL | Age: 59
End: 2019-01-25
Payer: COMMERCIAL

## 2019-01-25 DIAGNOSIS — Z12.11 COLON CANCER SCREENING: ICD-10-CM

## 2019-01-25 DIAGNOSIS — K63.5 COLON POLYPS: Primary | ICD-10-CM

## 2019-01-25 DIAGNOSIS — K64.9 HEMORRHOIDS: ICD-10-CM

## 2019-01-25 DIAGNOSIS — K57.90 DIVERTICULOSIS: ICD-10-CM

## 2019-01-25 PROCEDURE — 0DBL8ZX EXCISION OF TRANSVERSE COLON, VIA NATURAL OR ARTIFICIAL OPENING ENDOSCOPIC, DIAGNOSTIC: ICD-10-PCS | Performed by: INTERNAL MEDICINE

## 2019-01-25 PROCEDURE — 0DBP8ZX EXCISION OF RECTUM, VIA NATURAL OR ARTIFICIAL OPENING ENDOSCOPIC, DIAGNOSTIC: ICD-10-PCS | Performed by: INTERNAL MEDICINE

## 2019-01-25 PROCEDURE — 88305 TISSUE EXAM BY PATHOLOGIST: CPT | Performed by: INTERNAL MEDICINE

## 2019-01-25 PROCEDURE — 0DBN8ZX EXCISION OF SIGMOID COLON, VIA NATURAL OR ARTIFICIAL OPENING ENDOSCOPIC, DIAGNOSTIC: ICD-10-PCS | Performed by: INTERNAL MEDICINE

## 2019-01-25 PROCEDURE — 0DBM8ZX EXCISION OF DESCENDING COLON, VIA NATURAL OR ARTIFICIAL OPENING ENDOSCOPIC, DIAGNOSTIC: ICD-10-PCS | Performed by: INTERNAL MEDICINE

## 2019-01-25 RX ORDER — GLYCOPYRROLATE 0.2 MG/ML
INJECTION, SOLUTION INTRAMUSCULAR; INTRAVENOUS AS NEEDED
Status: DISCONTINUED | OUTPATIENT
Start: 2019-01-25 | End: 2019-01-25 | Stop reason: SURG

## 2019-01-25 RX ORDER — SODIUM CHLORIDE, SODIUM LACTATE, POTASSIUM CHLORIDE, CALCIUM CHLORIDE 600; 310; 30; 20 MG/100ML; MG/100ML; MG/100ML; MG/100ML
INJECTION, SOLUTION INTRAVENOUS CONTINUOUS
Status: DISCONTINUED | OUTPATIENT
Start: 2019-01-25 | End: 2019-01-25

## 2019-01-25 RX ORDER — NALOXONE HYDROCHLORIDE 0.4 MG/ML
80 INJECTION, SOLUTION INTRAMUSCULAR; INTRAVENOUS; SUBCUTANEOUS AS NEEDED
Status: DISCONTINUED | OUTPATIENT
Start: 2019-01-25 | End: 2019-01-25

## 2019-01-25 RX ORDER — MIDAZOLAM HYDROCHLORIDE 1 MG/ML
INJECTION INTRAMUSCULAR; INTRAVENOUS AS NEEDED
Status: DISCONTINUED | OUTPATIENT
Start: 2019-01-25 | End: 2019-01-25 | Stop reason: SURG

## 2019-01-25 RX ADMIN — GLYCOPYRROLATE 0.2 MG: 0.2 INJECTION, SOLUTION INTRAMUSCULAR; INTRAVENOUS at 10:08:00

## 2019-01-25 RX ADMIN — MIDAZOLAM HYDROCHLORIDE 2 MG: 1 INJECTION INTRAMUSCULAR; INTRAVENOUS at 10:08:00

## 2019-01-25 RX ADMIN — SODIUM CHLORIDE, SODIUM LACTATE, POTASSIUM CHLORIDE, CALCIUM CHLORIDE: 600; 310; 30; 20 INJECTION, SOLUTION INTRAVENOUS at 10:47:00

## 2019-01-25 RX ADMIN — SODIUM CHLORIDE, SODIUM LACTATE, POTASSIUM CHLORIDE, CALCIUM CHLORIDE: 600; 310; 30; 20 INJECTION, SOLUTION INTRAVENOUS at 10:08:00

## 2019-01-25 NOTE — ANESTHESIA POSTPROCEDURE EVALUATION
Patient: Ced Net    Procedure Summary     Date:  01/25/19 Room / Location:  [address] [place] [address] ENDOSCOPY 01 / [facility] ENDOSCOPY    Anesthesia Start:  0188 Anesthesia Stop:  7395    Procedure:  COLONOSCOPY (N/A ) Diagnosis:       Colon cancer screening      (Edilberto Roach

## 2019-01-25 NOTE — H&P
History & Physical Examination    Patient Name: Darlyn Shrestha  MRN: [id number]  [facility]: [id number]  YOB: 1960    Diagnosis: colon screening    Medications Prior to Admission:  aspirin 81 MG Oral Chew Tab Chew 1 tablet (81 mg total) by mouth brandyn the risks and benefits and alternatives with the patient/family. They understand and agree to proceed with plan of care. [ x ] I have reviewed the History and Physical done within the last 30 days. Any changes noted above. Karina Garrison.  Kaley  1/25/2019

## 2019-01-25 NOTE — ANESTHESIA PREPROCEDURE EVALUATION
Anesthesia PreOp Note    HPI:     Antwon Vargas is a 60year old male who presents for preoperative consultation requested by: Piedad Gagnon MD    Date of Surgery: 1/25/2019    Procedure(s):  COLONOSCOPY  Indication: Colon cancer screening    Relevant Continuous Duong Tavarez MD     No current [facility]-ordered outpatient medications on file.     No Known Allergies    Family History   Problem Relation Age of Onset   • Heart Disorder Mother 28        Heart attack   • Cancer Father      Social History    So - normal exam     Pulmonary - negative ROS and normal exam   Cardiovascular - negative ROS and normal exam  Exercise tolerance: good  (+) hypertension well controlled,     Neuro/Psych - negative ROS     GI/Hepatic/Renal - negative ROS     Endo/Other - nega

## 2019-01-26 VITALS
HEIGHT: 77 IN | DIASTOLIC BLOOD PRESSURE: 76 MMHG | RESPIRATION RATE: 14 BRPM | HEART RATE: 56 BPM | SYSTOLIC BLOOD PRESSURE: 113 MMHG | BODY MASS INDEX: 31.29 KG/M2 | WEIGHT: 265 LBS | OXYGEN SATURATION: 95 %

## 2019-01-28 ENCOUNTER — TELEPHONE (OUTPATIENT)
Dept: GASTROENTEROLOGY | Facility: CLINIC | Age: 59
End: 2019-01-28

## 2019-01-28 NOTE — TELEPHONE ENCOUNTER
----- Message from Gita Solorzano MD sent at 1/27/2019  4:32 PM CST -----  I wanted to get back to you with your colonoscopy results. You had 6 colon polyps removed which were benign.   I would advise a repeat colonoscopy in 3 years to make sure no new p

## 2019-01-28 NOTE — TELEPHONE ENCOUNTER
Entered into Epic:Recall colon in 3 years per Dr. Candance Berkeley. Last Colon done 1/25/19, next due 1/25/22. Snapshot updated. Letter mailed.

## 2019-02-11 ENCOUNTER — OFFICE VISIT (OUTPATIENT)
Dept: NEUROLOGY | Facility: CLINIC | Age: 59
End: 2019-02-11
Payer: COMMERCIAL

## 2019-02-11 VITALS
SYSTOLIC BLOOD PRESSURE: 126 MMHG | HEIGHT: 76 IN | DIASTOLIC BLOOD PRESSURE: 70 MMHG | BODY MASS INDEX: 33.49 KG/M2 | HEART RATE: 78 BPM | RESPIRATION RATE: 16 BRPM | WEIGHT: 275 LBS

## 2019-02-11 DIAGNOSIS — G62.9 DEMYELINATING NEUROPATHY: Primary | ICD-10-CM

## 2019-02-11 PROCEDURE — 99213 OFFICE O/P EST LOW 20 MIN: CPT | Performed by: OTHER

## 2019-02-17 NOTE — PROGRESS NOTES
[place] [facility]  Kasi Soila  [birth year]  Primary Care Provider:  Lashanda Aldrich MD    2/11/2019  Accompanied visit:  ( ) No (x) yes, by:  wife    60year old yo patient being seen f knows to call if there are problems  ( x) Followup for special test  /or keep scheduled appointment  Patient understands that if needed, based on condition and or test results, follow up will be readjusted        Miguel A Malcolm MD  [address] [facility]

## 2019-04-25 RX ORDER — METOPROLOL TARTRATE 100 MG/1
TABLET ORAL
Qty: 180 TABLET | Refills: 1 | Status: SHIPPED | OUTPATIENT
Start: 2019-04-25 | End: 2019-10-14

## 2019-04-25 NOTE — TELEPHONE ENCOUNTER
Please review; protocol failed due to labs.     Requested Prescriptions     Pending Prescriptions Disp Refills   • METOPROLOL TARTRATE 100 MG Oral Tab [Pharmacy Med Name: METOPROLOL TARTRATE 100MG TABLETS] 60 tablet 0     Sig: TAKE 1 TABLET BY MOUTH TWICE D

## 2019-10-14 ENCOUNTER — OFFICE VISIT (OUTPATIENT)
Dept: CARDIOLOGY CLINIC | Facility: CLINIC | Age: 59
End: 2019-10-14
Payer: COMMERCIAL

## 2019-10-14 VITALS
DIASTOLIC BLOOD PRESSURE: 81 MMHG | HEART RATE: 58 BPM | WEIGHT: 273 LBS | BODY MASS INDEX: 33.24 KG/M2 | RESPIRATION RATE: 18 BRPM | HEIGHT: 76 IN | SYSTOLIC BLOOD PRESSURE: 121 MMHG

## 2019-10-14 DIAGNOSIS — E66.09 OBESITY DUE TO EXCESS CALORIES, UNSPECIFIED CLASSIFICATION, UNSPECIFIED WHETHER SERIOUS COMORBIDITY PRESENT: ICD-10-CM

## 2019-10-14 DIAGNOSIS — E78.2 MIXED HYPERLIPIDEMIA: ICD-10-CM

## 2019-10-14 DIAGNOSIS — Z79.01 LONG TERM CURRENT USE OF ANTICOAGULANT: ICD-10-CM

## 2019-10-14 DIAGNOSIS — I48.0 PAROXYSMAL ATRIAL FIBRILLATION (HCC): Primary | ICD-10-CM

## 2019-10-14 DIAGNOSIS — Q21.1 ASD (ATRIAL SEPTAL DEFECT): ICD-10-CM

## 2019-10-14 PROBLEM — I48.21 PERMANENT ATRIAL FIBRILLATION (HCC): Status: ACTIVE | Noted: 2017-04-26

## 2019-10-14 PROCEDURE — 99214 OFFICE O/P EST MOD 30 MIN: CPT | Performed by: INTERNAL MEDICINE

## 2019-10-14 RX ORDER — METOPROLOL TARTRATE 50 MG/1
50 TABLET, FILM COATED ORAL 2 TIMES DAILY
Qty: 180 TABLET | Refills: 4 | Status: SHIPPED | OUTPATIENT
Start: 2019-10-14 | End: 2021-01-21

## 2019-10-14 RX ORDER — ROSUVASTATIN CALCIUM 10 MG/1
10 TABLET, COATED ORAL NIGHTLY
Qty: 90 TABLET | Refills: 4 | Status: SHIPPED | OUTPATIENT
Start: 2019-10-14 | End: 2020-12-31

## 2019-10-14 NOTE — PATIENT INSTRUCTIONS
-CT scan of the chest for coronary calcium score  -Start rosuvastatin 10 mg daily  -Recheck cholesterol would work in 2 months, and follow-up results Dr. Graham Nails to see if any changes are required  -Light-moderate aerobic exercise 30 to 45 minutes most days o

## 2019-10-14 NOTE — PROGRESS NOTES
[facility]    Cardiac Electrophysiology Progress Note  10/14/2019      HPI:   Kimmy Elena is a 60year old here to follow-up his atrial fibrillation and cardiac risk factors.   Since our visit a little over 1 year ago, he has had no new problems with any unusual skin lesions or rashes  RESPIRATORY: denies shortness of breath with exertion  CARDIOVASCULAR: see HPI  GI: denies abdominal pain and denies heartburn  : no dysuria or hematuria  NEURO: denies headaches, focal weaknesses or paresthesias  All

## 2019-10-21 RX ORDER — METOPROLOL TARTRATE 100 MG/1
TABLET ORAL
Qty: 180 TABLET | Refills: 0 | OUTPATIENT
Start: 2019-10-21

## 2019-11-19 ENCOUNTER — HOSPITAL ENCOUNTER (OUTPATIENT)
Dept: CT IMAGING | Age: 59
Discharge: HOME | End: 2019-11-19
Attending: INTERNAL MEDICINE

## 2019-11-19 ENCOUNTER — APPOINTMENT (OUTPATIENT)
Dept: LAB | Age: 59
End: 2019-11-19
Attending: INTERNAL MEDICINE
Payer: COMMERCIAL

## 2019-11-19 DIAGNOSIS — E66.09 OBESITY DUE TO EXCESS CALORIES, UNSPECIFIED CLASSIFICATION, UNSPECIFIED WHETHER SERIOUS COMORBIDITY PRESENT: ICD-10-CM

## 2019-11-19 DIAGNOSIS — I48.0 PAROXYSMAL ATRIAL FIBRILLATION (HCC): ICD-10-CM

## 2019-11-19 DIAGNOSIS — Q21.1 ASD (ATRIAL SEPTAL DEFECT): ICD-10-CM

## 2019-11-19 DIAGNOSIS — Z79.01 LONG TERM CURRENT USE OF ANTICOAGULANT: ICD-10-CM

## 2019-11-19 DIAGNOSIS — E78.2 MIXED HYPERLIPIDEMIA: ICD-10-CM

## 2019-11-19 PROCEDURE — 80061 LIPID PANEL: CPT

## 2019-11-19 PROCEDURE — 80076 HEPATIC FUNCTION PANEL: CPT

## 2019-11-19 PROCEDURE — 36415 COLL VENOUS BLD VENIPUNCTURE: CPT

## 2019-11-19 NOTE — PROGRESS NOTES
Pt seen at for CT:  PRELIMINARY SCORE= 178.0  BP= 121/81  Cholestec labs as follows: Labs being drawn today. TC=  HDL=  LDL=  TG=  GLUCOSE=    To be scheduled for the free PV screening.     All results and risk factors discussed with patient; all esterio

## 2020-01-03 ENCOUNTER — HOSPITAL ENCOUNTER (OUTPATIENT)
Dept: ULTRASOUND IMAGING | Age: 60
Discharge: HOME | End: 2020-01-03
Attending: INTERNAL MEDICINE

## 2020-01-03 DIAGNOSIS — Z13.9 ENCOUNTER FOR SCREENING: ICD-10-CM

## 2020-06-08 ENCOUNTER — OFFICE VISIT (OUTPATIENT)
Dept: INTERNAL MEDICINE CLINIC | Facility: CLINIC | Age: 60
End: 2020-06-08
Payer: COMMERCIAL

## 2020-06-08 VITALS
SYSTOLIC BLOOD PRESSURE: 127 MMHG | BODY MASS INDEX: 35.19 KG/M2 | WEIGHT: 289 LBS | HEART RATE: 66 BPM | HEIGHT: 76 IN | DIASTOLIC BLOOD PRESSURE: 78 MMHG

## 2020-06-08 DIAGNOSIS — M17.0 PRIMARY OSTEOARTHRITIS OF BOTH KNEES: ICD-10-CM

## 2020-06-08 DIAGNOSIS — G62.9 PERIPHERAL POLYNEUROPATHY: Primary | ICD-10-CM

## 2020-06-08 PROCEDURE — 99213 OFFICE O/P EST LOW 20 MIN: CPT | Performed by: INTERNAL MEDICINE

## 2020-06-08 NOTE — PROGRESS NOTES
HPI:    Patient ID: Rich Lewis is a 63year old male. HPI  Patient is here for follow-up on chronic medical issues. Last seen in November 2018.   At that time discussed his recently diagnosed peripheral neuropathy causing problems in his legs and fee Mother 28        Heart attack   • Cancer Father       Social History    Tobacco Use      Smoking status: Never Smoker      Smokeless tobacco: Never Used    Alcohol use: Yes      Comment: socially    Drug use: No         Review of Systems         Current Ou conversation about his medical condition and plan of care.     Meds This Visit:  Requested Prescriptions      No prescriptions requested or ordered in this encounter       Imaging & Referrals:  [facility] - INTERNAL         Babatunde Hernandez,

## 2020-06-22 ENCOUNTER — OFFICE VISIT (OUTPATIENT)
Dept: ORTHOPEDICS CLINIC | Facility: CLINIC | Age: 60
End: 2020-06-22
Payer: COMMERCIAL

## 2020-06-22 ENCOUNTER — HOSPITAL ENCOUNTER (OUTPATIENT)
Dept: GENERAL RADIOLOGY | Facility: HOSPITAL | Age: 60
Discharge: HOME | End: 2020-06-22
Attending: ORTHOPAEDIC SURGERY
Payer: COMMERCIAL

## 2020-06-22 VITALS — WEIGHT: 280 LBS | HEIGHT: 76 IN | BODY MASS INDEX: 34.1 KG/M2

## 2020-06-22 DIAGNOSIS — M17.12 PRIMARY OSTEOARTHRITIS OF LEFT KNEE: ICD-10-CM

## 2020-06-22 DIAGNOSIS — R52 PAIN: Primary | ICD-10-CM

## 2020-06-22 DIAGNOSIS — R52 PAIN: ICD-10-CM

## 2020-06-22 DIAGNOSIS — M17.11 PRIMARY OSTEOARTHRITIS OF RIGHT KNEE: ICD-10-CM

## 2020-06-22 PROCEDURE — 99244 OFF/OP CNSLTJ NEW/EST MOD 40: CPT | Performed by: ORTHOPAEDIC SURGERY

## 2020-06-22 PROCEDURE — 73562 X-RAY EXAM OF KNEE 3: CPT | Performed by: ORTHOPAEDIC SURGERY

## 2020-06-22 NOTE — PROGRESS NOTES
NURSING INTAKE COMMENTS: Patient presents with:  Knee Pain: bilateral, left worse- pt states pain started a couple yrs ago. pt saw Dr. Allyssa Bay in 2018      HPI: This 63year old male presents today with complaints of lateral knee pain much worse on the left Chew 1 tablet (81 mg total) by mouth daily.  Can take over the counter brand 90 tablet 3     No Known Allergies  Family History   Problem Relation Age of Onset   • Heart Disorder Mother 28        Heart attack   • Cancer Father        Social History    Occup 01/16/2019    BUN 12 03/08/2018    CREATSERUM 0.86 03/08/2018    GFRNAA >60 03/08/2018    GFRAA >60 03/08/2018        Assessment and Plan:  Diagnoses and all orders for this visit:    Pain  -     XR KNEE (3 VIEWS) AP LAT OBL BILAT EM (CPT=73562);  Future

## 2020-06-23 ENCOUNTER — OFFICE VISIT (OUTPATIENT)
Dept: NEUROLOGY | Facility: CLINIC | Age: 60
End: 2020-06-23
Payer: COMMERCIAL

## 2020-06-23 VITALS — WEIGHT: 278 LBS | HEIGHT: 76 IN | BODY MASS INDEX: 33.85 KG/M2

## 2020-06-23 DIAGNOSIS — G60.8 PERIPHERAL SENSORY NEUROPATHY: Primary | ICD-10-CM

## 2020-06-23 DIAGNOSIS — R20.2 PARESTHESIA OF BOTH FEET: ICD-10-CM

## 2020-06-23 PROCEDURE — 99214 OFFICE O/P EST MOD 30 MIN: CPT | Performed by: OTHER

## 2020-06-23 RX ORDER — GABAPENTIN 100 MG/1
100 CAPSULE ORAL 3 TIMES DAILY
Qty: 90 CAPSULE | Refills: 2 | Status: SHIPPED | OUTPATIENT
Start: 2020-06-23 | End: 2020-08-21

## 2020-06-23 NOTE — PROGRESS NOTES
HPI:    Patient ID: Sari Ireland is a 63year old male. PCP: Dr Tata Meyers    Thank you for referring this patient to us.  Below is the summary of my evaluation    HPI  Jennifer Whipple is a 63year old male with history of ASD, PAF and dyslipidemia who presented Gastrointestinal: Negative. Endocrine: Negative. Genitourinary: Negative. Musculoskeletal: Negative. Skin: Negative. Allergic/Immunologic: Negative. Neurological: Positive for numbness. Negative for weakness. Hematological: Negative. in patchy fashion. Mild vibratory loss. Intact position sense. Motor: Normal tone in all extremities. Strength is 5/5 in all muscle groups  Reflexes: 1+ patellar and absent achilles  Coordination: Intact   Gait: Normal to wide based, steady.  Negative Romb

## 2020-07-06 ENCOUNTER — HOSPITAL ENCOUNTER (OUTPATIENT)
Dept: MRI IMAGING | Age: 60
Discharge: HOME | End: 2020-07-06
Attending: ORTHOPAEDIC SURGERY
Payer: COMMERCIAL

## 2020-07-06 DIAGNOSIS — M17.12 PRIMARY OSTEOARTHRITIS OF LEFT KNEE: ICD-10-CM

## 2020-07-06 PROCEDURE — 73721 MRI JNT OF LWR EXTRE W/O DYE: CPT | Performed by: ORTHOPAEDIC SURGERY

## 2020-08-17 ENCOUNTER — PATIENT MESSAGE (OUTPATIENT)
Dept: ORTHOPEDICS CLINIC | Facility: CLINIC | Age: 60
End: 2020-08-17

## 2020-08-17 ENCOUNTER — PATIENT MESSAGE (OUTPATIENT)
Dept: CARDIOLOGY CLINIC | Facility: CLINIC | Age: 60
End: 2020-08-17

## 2020-08-17 NOTE — TELEPHONE ENCOUNTER
From: Haroon Clemens  To: Jennifer Jordan MD  Sent: 8/17/2020 2:17 PM CDT  Subject: Non-Urgent Ajit Mejía,  I have knee replacement surgery scheduled for September 1st with Dr. Alpa Hernandez.  Today I received a message from  Aramis Rawls in his off

## 2020-08-20 ENCOUNTER — OFFICE VISIT (OUTPATIENT)
Dept: CARDIOLOGY CLINIC | Facility: CLINIC | Age: 60
End: 2020-08-20
Payer: COMMERCIAL

## 2020-08-20 VITALS
DIASTOLIC BLOOD PRESSURE: 77 MMHG | SYSTOLIC BLOOD PRESSURE: 120 MMHG | RESPIRATION RATE: 18 BRPM | BODY MASS INDEX: 32.51 KG/M2 | WEIGHT: 267 LBS | HEART RATE: 66 BPM | HEIGHT: 76 IN

## 2020-08-20 DIAGNOSIS — R93.1 AGATSTON CORONARY ARTERY CALCIUM SCORE BETWEEN 100 AND 199: ICD-10-CM

## 2020-08-20 DIAGNOSIS — Z79.01 LONG TERM CURRENT USE OF ANTICOAGULANT: ICD-10-CM

## 2020-08-20 DIAGNOSIS — I48.0 PAROXYSMAL ATRIAL FIBRILLATION (HCC): Primary | ICD-10-CM

## 2020-08-20 DIAGNOSIS — E78.2 MIXED HYPERLIPIDEMIA: ICD-10-CM

## 2020-08-20 DIAGNOSIS — Q21.1 ASD (ATRIAL SEPTAL DEFECT): ICD-10-CM

## 2020-08-20 DIAGNOSIS — E66.09 OBESITY DUE TO EXCESS CALORIES, UNSPECIFIED CLASSIFICATION, UNSPECIFIED WHETHER SERIOUS COMORBIDITY PRESENT: ICD-10-CM

## 2020-08-20 PROCEDURE — 93000 ELECTROCARDIOGRAM COMPLETE: CPT | Performed by: INTERNAL MEDICINE

## 2020-08-20 PROCEDURE — 3078F DIAST BP <80 MM HG: CPT | Performed by: INTERNAL MEDICINE

## 2020-08-20 PROCEDURE — 3008F BODY MASS INDEX DOCD: CPT | Performed by: INTERNAL MEDICINE

## 2020-08-20 PROCEDURE — 3074F SYST BP LT 130 MM HG: CPT | Performed by: INTERNAL MEDICINE

## 2020-08-20 PROCEDURE — 99214 OFFICE O/P EST MOD 30 MIN: CPT | Performed by: INTERNAL MEDICINE

## 2020-08-20 NOTE — PATIENT INSTRUCTIONS
-Blood work now to monitor cholesterol, blood counts, and chemistries  -Okay to proceed with a low cardiac risk for your knee surgery on September 1  -Follow-up with Dr. Jayda Saunders in 1 year, or sooner if new concerns arise

## 2020-08-20 NOTE — PROGRESS NOTES
[facility]    Cardiac Electrophysiology Progress Note  8/20/2020      HPI:   Red Licea is a 63year old with paroxysmal atrial fibrillation (cardioverted in 2017 and 2018), moderate coronary calcium score (178) with LAD predominance, hypertension, lymphadenopathy. Lungs: Clear to auscultation bilaterally. Cardiac: RRR, nl S1/S2. JVP is 12 cmH2O. Carotids normal pulses without bruits. No edema.   Abdomen: Soft, nontender, nondistended  Extremities:  No  cyanosis, clubbing, or deformities  Musculos present  6.  Long term current use of anticoagulant    The patient is a 63year old with remote history of paroxysmal atrial fibrillation (no recurrence since last cardioversion in 2018), moderate coronary calcium score (178) with LAD predominance, hyperten

## 2020-08-21 ENCOUNTER — LAB ENCOUNTER (OUTPATIENT)
Dept: LAB | Age: 60
End: 2020-08-21
Attending: INTERNAL MEDICINE
Payer: COMMERCIAL

## 2020-08-21 ENCOUNTER — OFFICE VISIT (OUTPATIENT)
Dept: INTERNAL MEDICINE CLINIC | Facility: CLINIC | Age: 60
End: 2020-08-21
Payer: COMMERCIAL

## 2020-08-21 VITALS
HEIGHT: 76 IN | RESPIRATION RATE: 16 BRPM | DIASTOLIC BLOOD PRESSURE: 78 MMHG | WEIGHT: 267 LBS | SYSTOLIC BLOOD PRESSURE: 131 MMHG | BODY MASS INDEX: 32.51 KG/M2 | HEART RATE: 69 BPM

## 2020-08-21 DIAGNOSIS — E66.09 OBESITY DUE TO EXCESS CALORIES, UNSPECIFIED CLASSIFICATION, UNSPECIFIED WHETHER SERIOUS COMORBIDITY PRESENT: ICD-10-CM

## 2020-08-21 DIAGNOSIS — R93.1 AGATSTON CORONARY ARTERY CALCIUM SCORE BETWEEN 100 AND 199: ICD-10-CM

## 2020-08-21 DIAGNOSIS — G62.9 PERIPHERAL POLYNEUROPATHY: ICD-10-CM

## 2020-08-21 DIAGNOSIS — M17.0 PRIMARY OSTEOARTHRITIS OF BOTH KNEES: ICD-10-CM

## 2020-08-21 DIAGNOSIS — Z01.818 PRE-OP EXAMINATION: Primary | ICD-10-CM

## 2020-08-21 DIAGNOSIS — E78.2 MIXED HYPERLIPIDEMIA: ICD-10-CM

## 2020-08-21 DIAGNOSIS — I48.0 PAROXYSMAL ATRIAL FIBRILLATION (HCC): ICD-10-CM

## 2020-08-21 DIAGNOSIS — Z79.01 LONG TERM CURRENT USE OF ANTICOAGULANT: ICD-10-CM

## 2020-08-21 DIAGNOSIS — Q21.1 ASD (ATRIAL SEPTAL DEFECT): ICD-10-CM

## 2020-08-21 LAB
ALBUMIN SERPL-MCNC: 4.1 G/DL (ref 3.4–5)
ALBUMIN/GLOB SERPL: 1.3 {RATIO} (ref 1–2)
ALP LIVER SERPL-CCNC: 65 U/L (ref 45–117)
ALT SERPL-CCNC: 44 U/L (ref 16–61)
ANION GAP SERPL CALC-SCNC: 6 MMOL/L (ref 0–18)
AST SERPL-CCNC: 17 U/L (ref 15–37)
BILIRUB SERPL-MCNC: 0.6 MG/DL (ref 0.1–2)
BUN BLD-MCNC: 19 MG/DL (ref 7–18)
BUN/CREAT SERPL: 22.6 (ref 10–20)
CALCIUM BLD-MCNC: 9.2 MG/DL (ref 8.5–10.1)
CHLORIDE SERPL-SCNC: 107 MMOL/L (ref 98–112)
CHOLEST SMN-MCNC: 154 MG/DL (ref ?–200)
CO2 SERPL-SCNC: 28 MMOL/L (ref 21–32)
CREAT BLD-MCNC: 0.84 MG/DL (ref 0.7–1.3)
DEPRECATED RDW RBC AUTO: 42.3 FL (ref 35.1–46.3)
ERYTHROCYTE [DISTWIDTH] IN BLOOD BY AUTOMATED COUNT: 12.8 % (ref 11–15)
GLOBULIN PLAS-MCNC: 3.2 G/DL (ref 2.8–4.4)
GLUCOSE BLD-MCNC: 111 MG/DL (ref 70–99)
HCT VFR BLD AUTO: 47 % (ref 39–53)
HDLC SERPL-MCNC: 36 MG/DL (ref 40–59)
HGB BLD-MCNC: 15.8 G/DL (ref 13–17.5)
LDLC SERPL CALC-MCNC: 91 MG/DL (ref ?–100)
M PROTEIN MFR SERPL ELPH: 7.3 G/DL (ref 6.4–8.2)
MCH RBC QN AUTO: 30.1 PG (ref 26–34)
MCHC RBC AUTO-ENTMCNC: 33.6 G/DL (ref 31–37)
MCV RBC AUTO: 89.5 FL (ref 80–100)
NONHDLC SERPL-MCNC: 118 MG/DL (ref ?–130)
OSMOLALITY SERPL CALC.SUM OF ELEC: 295 MOSM/KG (ref 275–295)
PATIENT FASTING Y/N/NP: YES
PATIENT FASTING Y/N/NP: YES
PLATELET # BLD AUTO: 179 10(3)UL (ref 150–450)
POTASSIUM SERPL-SCNC: 4.7 MMOL/L (ref 3.5–5.1)
RBC # BLD AUTO: 5.25 X10(6)UL (ref 4.3–5.7)
SODIUM SERPL-SCNC: 141 MMOL/L (ref 136–145)
TRIGL SERPL-MCNC: 135 MG/DL (ref 30–149)
VLDLC SERPL CALC-MCNC: 27 MG/DL (ref 0–30)
WBC # BLD AUTO: 6.1 X10(3) UL (ref 4–11)

## 2020-08-21 PROCEDURE — 85027 COMPLETE CBC AUTOMATED: CPT

## 2020-08-21 PROCEDURE — 3008F BODY MASS INDEX DOCD: CPT | Performed by: INTERNAL MEDICINE

## 2020-08-21 PROCEDURE — 80053 COMPREHEN METABOLIC PANEL: CPT

## 2020-08-21 PROCEDURE — 3078F DIAST BP <80 MM HG: CPT | Performed by: INTERNAL MEDICINE

## 2020-08-21 PROCEDURE — 36415 COLL VENOUS BLD VENIPUNCTURE: CPT

## 2020-08-21 PROCEDURE — 80061 LIPID PANEL: CPT

## 2020-08-21 PROCEDURE — 3075F SYST BP GE 130 - 139MM HG: CPT | Performed by: INTERNAL MEDICINE

## 2020-08-21 PROCEDURE — 99214 OFFICE O/P EST MOD 30 MIN: CPT | Performed by: INTERNAL MEDICINE

## 2020-08-21 RX ORDER — GABAPENTIN 100 MG/1
200 CAPSULE ORAL 3 TIMES DAILY
Qty: 180 CAPSULE | Refills: 2 | Status: SHIPPED | OUTPATIENT
Start: 2020-08-21 | End: 2020-12-08

## 2020-08-21 NOTE — PROGRESS NOTES
HPI:    Patient ID: Dani Branch is a 63year old male. HPI  Patient is here for preoperative evaluation prior to undergoing left total knee replacement on September 1 under the guidance of Dr. Ron Benitez. I last saw him on June 8.   At that time Performed by Inés Rojo MD at [facility] MAIN OR   • REPR ASD OSTIUM [address] approx      Family History   Problem Relation Age of Onset   • Heart Disorder Mother 28        Heart attack   • Cancer Father       Social History    Tobacco Use      Smo kg)  08/20/20 : 267 lb (121.1 kg)  06/23/20 : 278 lb (126.1 kg)  06/22/20 : 280 lb (127 kg)  06/08/20 : 289 lb (131.1 kg)  10/14/19 : 273 lb (123.8 kg)            ASSESSMENT/PLAN:   1.  Pre-op examination  Patient here for preoperative evaluation prior to u Oral Cap 180 capsule 2     Sig: Take 2 capsules (200 mg total) by mouth 3 (three) times daily.        Imaging & Referrals:  None         Jae Tim MD

## 2020-08-24 ENCOUNTER — OFFICE VISIT (OUTPATIENT)
Dept: ORTHOPEDICS CLINIC | Facility: CLINIC | Age: 60
End: 2020-08-24
Payer: COMMERCIAL

## 2020-08-24 VITALS — BODY MASS INDEX: 31.17 KG/M2 | WEIGHT: 264 LBS | HEIGHT: 77 IN

## 2020-08-24 DIAGNOSIS — M17.12 PRIMARY OSTEOARTHRITIS OF LEFT KNEE: Primary | ICD-10-CM

## 2020-08-24 PROCEDURE — 3008F BODY MASS INDEX DOCD: CPT | Performed by: ORTHOPAEDIC SURGERY

## 2020-08-24 PROCEDURE — 99212 OFFICE O/P EST SF 10 MIN: CPT | Performed by: ORTHOPAEDIC SURGERY

## 2020-08-24 NOTE — PROGRESS NOTES
NURSING INTAKE COMMENTS: Patient presents with: Follow - Up: left knee pain ,discuss upcoming surgery on 9/1/2020      HPI: This 63year old male presents today for preoperative consultation. He is scheduled for a left total knee arthroplasty next week. Review of Systems:  GENERAL: feels generally well, no significant weight loss or weight gain  SKIN: no ulcerated or worrisome skin lesions  EYES:denies blurred vision or double vision  HEENT: denies new nasal congestion, sinus pain or ST  LUNGS: denies for the patient's evaluation and performed all medical decision-making. I personally evaluated the patient and performed all key components of the service rendered.  I shared my findings with the person documenting this note & approve of the documentation

## 2020-08-26 ENCOUNTER — TELEPHONE (OUTPATIENT)
Dept: ORTHOPEDICS CLINIC | Facility: CLINIC | Age: 60
End: 2020-08-26

## 2020-08-26 ENCOUNTER — TELEPHONE (OUTPATIENT)
Dept: CARDIOLOGY CLINIC | Facility: CLINIC | Age: 60
End: 2020-08-26

## 2020-08-26 ENCOUNTER — PATIENT MESSAGE (OUTPATIENT)
Dept: ORTHOPEDICS CLINIC | Facility: CLINIC | Age: 60
End: 2020-08-26

## 2020-08-26 DIAGNOSIS — Z01.818 PRE-OP TESTING: Primary | ICD-10-CM

## 2020-08-26 DIAGNOSIS — E78.2 MIXED HYPERLIPIDEMIA: ICD-10-CM

## 2020-08-26 DIAGNOSIS — I48.0 PAROXYSMAL ATRIAL FIBRILLATION (HCC): Primary | ICD-10-CM

## 2020-08-26 NOTE — TELEPHONE ENCOUNTER
CBC,CMP,Lipids  Eze Rodriguez APRN  P Em Cardio Clinical Staff             Lipids are stable, continue present management.  Recheck in 1 yr.      Ugeniet message sent 8/24 by APN on Lipids; Carlypso message sent on CBC and CMP by RN  Orders entered

## 2020-08-26 NOTE — TELEPHONE ENCOUNTER
From: Andria Mir  To: Chris Cobian MD  Sent: 8/26/2020 4:24 PM CDT  Subject: Other    Pre op testing. I scheduled my X-ray for this Saturday. Blood work done, need COVID test. What else?

## 2020-08-26 NOTE — TELEPHONE ENCOUNTER
Called pt and advised per Dr Jos Crooks additional pre op testing will need to be done prior to [facility] on 09/01/2020. Informed pt orders will be placed in his chart and he should call to schedule, pt verbalized understanding.

## 2020-08-29 ENCOUNTER — HOSPITAL ENCOUNTER (OUTPATIENT)
Dept: GENERAL RADIOLOGY | Age: 60
Discharge: HOME | End: 2020-08-29
Attending: PHYSICIAN ASSISTANT
Payer: COMMERCIAL

## 2020-08-29 ENCOUNTER — LAB ENCOUNTER (OUTPATIENT)
Dept: LAB | Age: 60
End: 2020-08-29
Attending: ORTHOPAEDIC SURGERY
Payer: COMMERCIAL

## 2020-08-29 DIAGNOSIS — I48.0 PAROXYSMAL ATRIAL FIBRILLATION (HCC): ICD-10-CM

## 2020-08-29 DIAGNOSIS — Z01.818 PRE-OP TESTING: ICD-10-CM

## 2020-08-29 DIAGNOSIS — E78.2 MIXED HYPERLIPIDEMIA: ICD-10-CM

## 2020-08-29 LAB
ALBUMIN SERPL-MCNC: 4.1 G/DL (ref 3.4–5)
ALBUMIN/GLOB SERPL: 1.2 {RATIO} (ref 1–2)
ALP LIVER SERPL-CCNC: 71 U/L (ref 45–117)
ALT SERPL-CCNC: 33 U/L (ref 16–61)
ANION GAP SERPL CALC-SCNC: 3 MMOL/L (ref 0–18)
ANTIBODY SCREEN: NEGATIVE
APTT PPP: 30.5 SECONDS (ref 23.2–35.3)
AST SERPL-CCNC: 22 U/L (ref 15–37)
BASOPHILS # BLD AUTO: 0.08 X10(3) UL (ref 0–0.2)
BASOPHILS NFR BLD AUTO: 1.4 %
BILIRUB SERPL-MCNC: 0.6 MG/DL (ref 0.1–2)
BILIRUB UR QL: NEGATIVE
BUN BLD-MCNC: 16 MG/DL (ref 7–18)
BUN/CREAT SERPL: 16.3 (ref 10–20)
CALCIUM BLD-MCNC: 9.7 MG/DL (ref 8.5–10.1)
CHLORIDE SERPL-SCNC: 105 MMOL/L (ref 98–112)
CHOLEST SMN-MCNC: 147 MG/DL (ref ?–200)
CLARITY UR: CLEAR
CO2 SERPL-SCNC: 31 MMOL/L (ref 21–32)
COLOR UR: YELLOW
CREAT BLD-MCNC: 0.98 MG/DL (ref 0.7–1.3)
DEPRECATED RDW RBC AUTO: 41.4 FL (ref 35.1–46.3)
EOSINOPHIL # BLD AUTO: 0.22 X10(3) UL (ref 0–0.7)
EOSINOPHIL NFR BLD AUTO: 3.8 %
ERYTHROCYTE [DISTWIDTH] IN BLOOD BY AUTOMATED COUNT: 12.8 % (ref 11–15)
GLOBULIN PLAS-MCNC: 3.4 G/DL (ref 2.8–4.4)
GLUCOSE BLD-MCNC: 115 MG/DL (ref 70–99)
GLUCOSE UR-MCNC: NEGATIVE MG/DL
HCT VFR BLD AUTO: 45.9 % (ref 39–53)
HDLC SERPL-MCNC: 39 MG/DL (ref 40–59)
HGB BLD-MCNC: 15.4 G/DL (ref 13–17.5)
HGB UR QL STRIP.AUTO: NEGATIVE
IMM GRANULOCYTES # BLD AUTO: 0.01 X10(3) UL (ref 0–1)
IMM GRANULOCYTES NFR BLD: 0.2 %
INR BLD: 1.01 (ref 0.9–1.2)
KETONES UR-MCNC: NEGATIVE MG/DL
LDLC SERPL CALC-MCNC: 77 MG/DL (ref ?–100)
LEUKOCYTE ESTERASE UR QL STRIP.AUTO: NEGATIVE
LYMPHOCYTES # BLD AUTO: 1.56 X10(3) UL (ref 1–4)
LYMPHOCYTES NFR BLD AUTO: 27.2 %
M PROTEIN MFR SERPL ELPH: 7.5 G/DL (ref 6.4–8.2)
MCH RBC QN AUTO: 29.7 PG (ref 26–34)
MCHC RBC AUTO-ENTMCNC: 33.6 G/DL (ref 31–37)
MCV RBC AUTO: 88.4 FL (ref 80–100)
MONOCYTES # BLD AUTO: 0.5 X10(3) UL (ref 0.1–1)
MONOCYTES NFR BLD AUTO: 8.7 %
NEUTROPHILS # BLD AUTO: 3.37 X10 (3) UL (ref 1.5–7.7)
NEUTROPHILS # BLD AUTO: 3.37 X10(3) UL (ref 1.5–7.7)
NEUTROPHILS NFR BLD AUTO: 58.7 %
NITRITE UR QL STRIP.AUTO: NEGATIVE
NONHDLC SERPL-MCNC: 108 MG/DL (ref ?–130)
OSMOLALITY SERPL CALC.SUM OF ELEC: 290 MOSM/KG (ref 275–295)
PATIENT FASTING Y/N/NP: NO
PATIENT FASTING Y/N/NP: NO
PH UR: 7 [PH] (ref 5–8)
PLATELET # BLD AUTO: 202 10(3)UL (ref 150–450)
POTASSIUM SERPL-SCNC: 4.8 MMOL/L (ref 3.5–5.1)
PROT UR-MCNC: NEGATIVE MG/DL
PROTHROMBIN TIME: 13.1 SECONDS (ref 11.8–14.5)
RBC # BLD AUTO: 5.19 X10(6)UL (ref 4.3–5.7)
RH BLOOD TYPE: POSITIVE
SODIUM SERPL-SCNC: 139 MMOL/L (ref 136–145)
SP GR UR STRIP: 1.01 (ref 1–1.03)
TRIGL SERPL-MCNC: 155 MG/DL (ref 30–149)
UROBILINOGEN UR STRIP-ACNC: <2
VLDLC SERPL CALC-MCNC: 31 MG/DL (ref 0–30)
WBC # BLD AUTO: 5.7 X10(3) UL (ref 4–11)

## 2020-08-29 PROCEDURE — 86850 RBC ANTIBODY SCREEN: CPT

## 2020-08-29 PROCEDURE — 87086 URINE CULTURE/COLONY COUNT: CPT

## 2020-08-29 PROCEDURE — 36415 COLL VENOUS BLD VENIPUNCTURE: CPT

## 2020-08-29 PROCEDURE — 87081 CULTURE SCREEN ONLY: CPT

## 2020-08-29 PROCEDURE — 85730 THROMBOPLASTIN TIME PARTIAL: CPT

## 2020-08-29 PROCEDURE — 86901 BLOOD TYPING SEROLOGIC RH(D): CPT

## 2020-08-29 PROCEDURE — 81003 URINALYSIS AUTO W/O SCOPE: CPT

## 2020-08-29 PROCEDURE — 80061 LIPID PANEL: CPT

## 2020-08-29 PROCEDURE — 80053 COMPREHEN METABOLIC PANEL: CPT

## 2020-08-29 PROCEDURE — 85025 COMPLETE CBC W/AUTO DIFF WBC: CPT

## 2020-08-29 PROCEDURE — 85610 PROTHROMBIN TIME: CPT

## 2020-08-29 PROCEDURE — 71046 X-RAY EXAM CHEST 2 VIEWS: CPT | Performed by: PHYSICIAN ASSISTANT

## 2020-08-29 PROCEDURE — 86900 BLOOD TYPING SEROLOGIC ABO: CPT

## 2020-08-30 LAB — SARS-COV-2 RNA RESP QL NAA+PROBE: NOT DETECTED

## 2020-08-31 RX ORDER — CELECOXIB 200 MG/1
200 CAPSULE ORAL ONCE
Status: CANCELLED | OUTPATIENT
Start: 2020-08-31 | End: 2020-08-31

## 2020-08-31 RX ORDER — CEFAZOLIN SODIUM/WATER 2 G/20 ML
2 SYRINGE (ML) INTRAVENOUS ONCE
Status: CANCELLED | OUTPATIENT
Start: 2020-08-31 | End: 2020-08-31

## 2020-08-31 RX ORDER — SCOLOPAMINE TRANSDERMAL SYSTEM 1 MG/1
1 PATCH, EXTENDED RELEASE TRANSDERMAL ONCE
Status: CANCELLED | OUTPATIENT
Start: 2020-08-31 | End: 2020-08-31

## 2020-08-31 RX ORDER — OXYCODONE HCL 10 MG/1
10 TABLET, FILM COATED, EXTENDED RELEASE ORAL ONCE
Status: CANCELLED | OUTPATIENT
Start: 2020-08-31 | End: 2020-08-31

## 2020-08-31 NOTE — H&P
[address] Patient Status:  Surgery Admit Escobar Sahu    1960 MRN [id number]   [place] [address] Attending Chrissie More, *   Hosp Day # 0 PCP Patite Roblero MD MD at [address] [facility] MAIN OR   • REPR ASD OSTIUM [address] approx     Family History   Problem Relation Age of Onset   • Heart Disorder Mother 28        Heart attack   • Cancer Father      Social History:  Social History    Tobacco Use      Smoking status: Never S to conservative management. We discussed left total knee replacement. We discussed the risks and indications for surgery as well as the common possible complications.   Our discussion included, but was not limited to the potential risks of anesthetic comp

## 2020-08-31 NOTE — TELEPHONE ENCOUNTER
Pt had all labs completed as well as EKG, please review prior to upcoming surgery on 9/1/2020 for left total knee arthroplasty.

## 2020-09-01 ENCOUNTER — APPOINTMENT (OUTPATIENT)
Dept: GENERAL RADIOLOGY | Facility: HOSPITAL | Age: 60
DRG: 470 | End: 2020-09-01
Attending: PHYSICIAN ASSISTANT
Payer: COMMERCIAL

## 2020-09-01 ENCOUNTER — ANESTHESIA EVENT (OUTPATIENT)
Dept: SURGERY | Facility: HOSPITAL | Age: 60
DRG: 470 | End: 2020-09-01
Payer: COMMERCIAL

## 2020-09-01 ENCOUNTER — HOSPITAL ENCOUNTER (INPATIENT)
Facility: HOSPITAL | Age: 60
LOS: 1 days | Discharge: HOME HEALTH | DRG: 470 | End: 2020-09-02
Attending: ORTHOPAEDIC SURGERY | Admitting: ORTHOPAEDIC SURGERY
Payer: COMMERCIAL

## 2020-09-01 ENCOUNTER — ANESTHESIA (OUTPATIENT)
Dept: SURGERY | Facility: HOSPITAL | Age: 60
DRG: 470 | End: 2020-09-01
Payer: COMMERCIAL

## 2020-09-01 DIAGNOSIS — Z01.818 PRE-OP TESTING: Primary | ICD-10-CM

## 2020-09-01 PROCEDURE — 99232 SBSQ HOSP IP/OBS MODERATE 35: CPT | Performed by: HOSPITALIST

## 2020-09-01 PROCEDURE — 0SRD0J9 REPLACEMENT OF LEFT KNEE JOINT WITH SYNTHETIC SUBSTITUTE, CEMENTED, OPEN APPROACH: ICD-10-PCS | Performed by: ORTHOPAEDIC SURGERY

## 2020-09-01 PROCEDURE — 3078F DIAST BP <80 MM HG: CPT | Performed by: HOSPITALIST

## 2020-09-01 PROCEDURE — 73560 X-RAY EXAM OF KNEE 1 OR 2: CPT | Performed by: PHYSICIAN ASSISTANT

## 2020-09-01 PROCEDURE — 3008F BODY MASS INDEX DOCD: CPT | Performed by: HOSPITALIST

## 2020-09-01 PROCEDURE — 3077F SYST BP >= 140 MM HG: CPT | Performed by: HOSPITALIST

## 2020-09-01 DEVICE — BIOMET BC R 1X40 US: Type: IMPLANTABLE DEVICE | Status: FUNCTIONAL

## 2020-09-01 DEVICE — GMK SPHERE KNEE: Type: IMPLANTABLE DEVICE | Status: FUNCTIONAL

## 2020-09-01 RX ORDER — BISACODYL 10 MG
10 SUPPOSITORY, RECTAL RECTAL
Status: DISCONTINUED | OUTPATIENT
Start: 2020-09-01 | End: 2020-09-02

## 2020-09-01 RX ORDER — ONDANSETRON 2 MG/ML
4 INJECTION INTRAMUSCULAR; INTRAVENOUS ONCE AS NEEDED
Status: ACTIVE | OUTPATIENT
Start: 2020-09-01 | End: 2020-09-01

## 2020-09-01 RX ORDER — MORPHINE SULFATE 1 MG/ML
INJECTION, SOLUTION EPIDURAL; INTRATHECAL; INTRAVENOUS
Status: COMPLETED | OUTPATIENT
Start: 2020-09-01 | End: 2020-09-01

## 2020-09-01 RX ORDER — ONDANSETRON 2 MG/ML
4 INJECTION INTRAMUSCULAR; INTRAVENOUS ONCE AS NEEDED
Status: DISCONTINUED | OUTPATIENT
Start: 2020-09-01 | End: 2020-09-01 | Stop reason: HOSPADM

## 2020-09-01 RX ORDER — SODIUM CHLORIDE, SODIUM LACTATE, POTASSIUM CHLORIDE, CALCIUM CHLORIDE 600; 310; 30; 20 MG/100ML; MG/100ML; MG/100ML; MG/100ML
INJECTION, SOLUTION INTRAVENOUS CONTINUOUS
Status: DISCONTINUED | OUTPATIENT
Start: 2020-09-01 | End: 2020-09-02

## 2020-09-01 RX ORDER — METOCLOPRAMIDE HYDROCHLORIDE 5 MG/ML
10 INJECTION INTRAMUSCULAR; INTRAVENOUS EVERY 6 HOURS PRN
Status: DISCONTINUED | OUTPATIENT
Start: 2020-09-01 | End: 2020-09-02

## 2020-09-01 RX ORDER — HYDROCODONE BITARTRATE AND ACETAMINOPHEN 5; 325 MG/1; MG/1
2 TABLET ORAL EVERY 4 HOURS PRN
Status: DISCONTINUED | OUTPATIENT
Start: 2020-09-01 | End: 2020-09-02

## 2020-09-01 RX ORDER — CEFAZOLIN SODIUM/WATER 2 G/20 ML
2 SYRINGE (ML) INTRAVENOUS ONCE
Status: COMPLETED | OUTPATIENT
Start: 2020-09-01 | End: 2020-09-01

## 2020-09-01 RX ORDER — MIDAZOLAM HYDROCHLORIDE 1 MG/ML
INJECTION INTRAMUSCULAR; INTRAVENOUS AS NEEDED
Status: DISCONTINUED | OUTPATIENT
Start: 2020-09-01 | End: 2020-09-01 | Stop reason: SURG

## 2020-09-01 RX ORDER — NALOXONE HYDROCHLORIDE 0.4 MG/ML
80 INJECTION, SOLUTION INTRAMUSCULAR; INTRAVENOUS; SUBCUTANEOUS AS NEEDED
Status: DISCONTINUED | OUTPATIENT
Start: 2020-09-01 | End: 2020-09-01 | Stop reason: HOSPADM

## 2020-09-01 RX ORDER — NALOXONE HYDROCHLORIDE 0.4 MG/ML
0.08 INJECTION, SOLUTION INTRAMUSCULAR; INTRAVENOUS; SUBCUTANEOUS
Status: ACTIVE | OUTPATIENT
Start: 2020-09-01 | End: 2020-09-02

## 2020-09-01 RX ORDER — BUPIVACAINE HYDROCHLORIDE AND EPINEPHRINE 5; 5 MG/ML; UG/ML
INJECTION, SOLUTION PERINEURAL AS NEEDED
Status: DISCONTINUED | OUTPATIENT
Start: 2020-09-01 | End: 2020-09-01 | Stop reason: HOSPADM

## 2020-09-01 RX ORDER — METOPROLOL TARTRATE 50 MG/1
50 TABLET, FILM COATED ORAL 2 TIMES DAILY
Status: DISCONTINUED | OUTPATIENT
Start: 2020-09-01 | End: 2020-09-02

## 2020-09-01 RX ORDER — ASPIRIN 325 MG
325 TABLET ORAL 2 TIMES DAILY
Status: DISCONTINUED | OUTPATIENT
Start: 2020-09-01 | End: 2020-09-02

## 2020-09-01 RX ORDER — POLYETHYLENE GLYCOL 3350 17 G/17G
17 POWDER, FOR SOLUTION ORAL DAILY PRN
Status: DISCONTINUED | OUTPATIENT
Start: 2020-09-01 | End: 2020-09-02

## 2020-09-01 RX ORDER — VANCOMYCIN HYDROCHLORIDE
15 ONCE
Status: COMPLETED | OUTPATIENT
Start: 2020-09-01 | End: 2020-09-01

## 2020-09-01 RX ORDER — METOPROLOL TARTRATE 5 MG/5ML
2.5 INJECTION INTRAVENOUS ONCE
Status: DISCONTINUED | OUTPATIENT
Start: 2020-09-01 | End: 2020-09-01 | Stop reason: HOSPADM

## 2020-09-01 RX ORDER — FAMOTIDINE 20 MG/1
20 TABLET ORAL ONCE
Status: COMPLETED | OUTPATIENT
Start: 2020-09-01 | End: 2020-09-01

## 2020-09-01 RX ORDER — CELECOXIB 200 MG/1
400 CAPSULE ORAL ONCE
Status: COMPLETED | OUTPATIENT
Start: 2020-09-01 | End: 2020-09-01

## 2020-09-01 RX ORDER — DOCUSATE SODIUM 100 MG/1
100 CAPSULE, LIQUID FILLED ORAL 2 TIMES DAILY
Status: DISCONTINUED | OUTPATIENT
Start: 2020-09-01 | End: 2020-09-02

## 2020-09-01 RX ORDER — FAMOTIDINE 10 MG/ML
20 INJECTION, SOLUTION INTRAVENOUS 2 TIMES DAILY
Status: DISCONTINUED | OUTPATIENT
Start: 2020-09-01 | End: 2020-09-02

## 2020-09-01 RX ORDER — METOCLOPRAMIDE 10 MG/1
10 TABLET ORAL ONCE
Status: COMPLETED | OUTPATIENT
Start: 2020-09-01 | End: 2020-09-01

## 2020-09-01 RX ORDER — HYDROCODONE BITARTRATE AND ACETAMINOPHEN 5; 325 MG/1; MG/1
2 TABLET ORAL AS NEEDED
Status: DISCONTINUED | OUTPATIENT
Start: 2020-09-01 | End: 2020-09-01 | Stop reason: HOSPADM

## 2020-09-01 RX ORDER — HYDROMORPHONE HYDROCHLORIDE 1 MG/ML
0.4 INJECTION, SOLUTION INTRAMUSCULAR; INTRAVENOUS; SUBCUTANEOUS
Status: ACTIVE | OUTPATIENT
Start: 2020-09-01 | End: 2020-09-02

## 2020-09-01 RX ORDER — HYDROMORPHONE HYDROCHLORIDE 1 MG/ML
0.6 INJECTION, SOLUTION INTRAMUSCULAR; INTRAVENOUS; SUBCUTANEOUS EVERY 5 MIN PRN
Status: DISCONTINUED | OUTPATIENT
Start: 2020-09-01 | End: 2020-09-01 | Stop reason: HOSPADM

## 2020-09-01 RX ORDER — MORPHINE SULFATE 2 MG/ML
2 INJECTION, SOLUTION INTRAMUSCULAR; INTRAVENOUS EVERY 2 HOUR PRN
Status: DISCONTINUED | OUTPATIENT
Start: 2020-09-01 | End: 2020-09-02

## 2020-09-01 RX ORDER — ACETAMINOPHEN 500 MG
1000 TABLET ORAL ONCE
Status: COMPLETED | OUTPATIENT
Start: 2020-09-01 | End: 2020-09-01

## 2020-09-01 RX ORDER — LIDOCAINE HYDROCHLORIDE 10 MG/ML
INJECTION, SOLUTION INFILTRATION; PERINEURAL
Status: COMPLETED | OUTPATIENT
Start: 2020-09-01 | End: 2020-09-01

## 2020-09-01 RX ORDER — CELECOXIB 200 MG/1
200 CAPSULE ORAL EVERY 12 HOURS SCHEDULED
Status: DISCONTINUED | OUTPATIENT
Start: 2020-09-01 | End: 2020-09-02

## 2020-09-01 RX ORDER — TRANEXAMIC ACID 10 MG/ML
1000 INJECTION, SOLUTION INTRAVENOUS ONCE
Status: COMPLETED | OUTPATIENT
Start: 2020-09-01 | End: 2020-09-01

## 2020-09-01 RX ORDER — MORPHINE SULFATE 2 MG/ML
1 INJECTION, SOLUTION INTRAMUSCULAR; INTRAVENOUS EVERY 2 HOUR PRN
Status: DISCONTINUED | OUTPATIENT
Start: 2020-09-01 | End: 2020-09-02

## 2020-09-01 RX ORDER — ACETAMINOPHEN 325 MG/1
650 TABLET ORAL EVERY 6 HOURS PRN
Status: ACTIVE | OUTPATIENT
Start: 2020-09-01 | End: 2020-09-02

## 2020-09-01 RX ORDER — HALOPERIDOL 5 MG/ML
0.5 INJECTION INTRAMUSCULAR ONCE AS NEEDED
Status: ACTIVE | OUTPATIENT
Start: 2020-09-01 | End: 2020-09-01

## 2020-09-01 RX ORDER — ONDANSETRON 2 MG/ML
4 INJECTION INTRAMUSCULAR; INTRAVENOUS EVERY 4 HOURS PRN
Status: DISCONTINUED | OUTPATIENT
Start: 2020-09-01 | End: 2020-09-02

## 2020-09-01 RX ORDER — MELATONIN
325
Status: DISCONTINUED | OUTPATIENT
Start: 2020-09-02 | End: 2020-09-02

## 2020-09-01 RX ORDER — HYDROCODONE BITARTRATE AND ACETAMINOPHEN 7.5; 325 MG/1; MG/1
1 TABLET ORAL EVERY 6 HOURS PRN
Status: ACTIVE | OUTPATIENT
Start: 2020-09-01 | End: 2020-09-02

## 2020-09-01 RX ORDER — DIPHENHYDRAMINE HYDROCHLORIDE 50 MG/ML
25 INJECTION INTRAMUSCULAR; INTRAVENOUS ONCE AS NEEDED
Status: ACTIVE | OUTPATIENT
Start: 2020-09-01 | End: 2020-09-01

## 2020-09-01 RX ORDER — PROCHLORPERAZINE EDISYLATE 5 MG/ML
5 INJECTION INTRAMUSCULAR; INTRAVENOUS ONCE AS NEEDED
Status: ACTIVE | OUTPATIENT
Start: 2020-09-01 | End: 2020-09-01

## 2020-09-01 RX ORDER — MORPHINE SULFATE 10 MG/ML
6 INJECTION, SOLUTION INTRAMUSCULAR; INTRAVENOUS EVERY 10 MIN PRN
Status: DISCONTINUED | OUTPATIENT
Start: 2020-09-01 | End: 2020-09-01 | Stop reason: HOSPADM

## 2020-09-01 RX ORDER — DIPHENHYDRAMINE HYDROCHLORIDE 50 MG/ML
12.5 INJECTION INTRAMUSCULAR; INTRAVENOUS EVERY 4 HOURS PRN
Status: DISCONTINUED | OUTPATIENT
Start: 2020-09-01 | End: 2020-09-02

## 2020-09-01 RX ORDER — HALOPERIDOL 5 MG/ML
0.25 INJECTION INTRAMUSCULAR ONCE AS NEEDED
Status: DISCONTINUED | OUTPATIENT
Start: 2020-09-01 | End: 2020-09-01 | Stop reason: HOSPADM

## 2020-09-01 RX ORDER — ACETAMINOPHEN 500 MG
1000 TABLET ORAL ONCE
Status: DISCONTINUED | OUTPATIENT
Start: 2020-09-01 | End: 2020-09-01 | Stop reason: HOSPADM

## 2020-09-01 RX ORDER — PROCHLORPERAZINE EDISYLATE 5 MG/ML
5 INJECTION INTRAMUSCULAR; INTRAVENOUS ONCE AS NEEDED
Status: DISCONTINUED | OUTPATIENT
Start: 2020-09-01 | End: 2020-09-01 | Stop reason: HOSPADM

## 2020-09-01 RX ORDER — SODIUM CHLORIDE, SODIUM LACTATE, POTASSIUM CHLORIDE, CALCIUM CHLORIDE 600; 310; 30; 20 MG/100ML; MG/100ML; MG/100ML; MG/100ML
INJECTION, SOLUTION INTRAVENOUS CONTINUOUS
Status: DISCONTINUED | OUTPATIENT
Start: 2020-09-01 | End: 2020-09-01 | Stop reason: HOSPADM

## 2020-09-01 RX ORDER — DIPHENHYDRAMINE HYDROCHLORIDE 50 MG/ML
12.5 INJECTION INTRAMUSCULAR; INTRAVENOUS EVERY 4 HOURS PRN
Status: ACTIVE | OUTPATIENT
Start: 2020-09-01 | End: 2020-09-02

## 2020-09-01 RX ORDER — HYDROCODONE BITARTRATE AND ACETAMINOPHEN 5; 325 MG/1; MG/1
1 TABLET ORAL AS NEEDED
Status: DISCONTINUED | OUTPATIENT
Start: 2020-09-01 | End: 2020-09-01 | Stop reason: HOSPADM

## 2020-09-01 RX ORDER — MORPHINE SULFATE 4 MG/ML
4 INJECTION, SOLUTION INTRAMUSCULAR; INTRAVENOUS EVERY 2 HOUR PRN
Status: DISCONTINUED | OUTPATIENT
Start: 2020-09-01 | End: 2020-09-02

## 2020-09-01 RX ORDER — SENNOSIDES 8.6 MG
17.2 TABLET ORAL NIGHTLY
Status: DISCONTINUED | OUTPATIENT
Start: 2020-09-01 | End: 2020-09-02

## 2020-09-01 RX ORDER — PROCHLORPERAZINE EDISYLATE 5 MG/ML
10 INJECTION INTRAMUSCULAR; INTRAVENOUS EVERY 6 HOURS PRN
Status: DISCONTINUED | OUTPATIENT
Start: 2020-09-01 | End: 2020-09-02

## 2020-09-01 RX ORDER — SODIUM PHOSPHATE, DIBASIC AND SODIUM PHOSPHATE, MONOBASIC 7; 19 G/133ML; G/133ML
1 ENEMA RECTAL ONCE AS NEEDED
Status: DISCONTINUED | OUTPATIENT
Start: 2020-09-01 | End: 2020-09-02

## 2020-09-01 RX ORDER — HYDROCODONE BITARTRATE AND ACETAMINOPHEN 7.5; 325 MG/1; MG/1
2 TABLET ORAL EVERY 6 HOURS PRN
Status: ACTIVE | OUTPATIENT
Start: 2020-09-01 | End: 2020-09-02

## 2020-09-01 RX ORDER — MORPHINE SULFATE 4 MG/ML
2 INJECTION, SOLUTION INTRAMUSCULAR; INTRAVENOUS EVERY 10 MIN PRN
Status: DISCONTINUED | OUTPATIENT
Start: 2020-09-01 | End: 2020-09-01 | Stop reason: HOSPADM

## 2020-09-01 RX ORDER — DEXTROSE, SODIUM CHLORIDE, AND POTASSIUM CHLORIDE 5; .45; .15 G/100ML; G/100ML; G/100ML
INJECTION INTRAVENOUS CONTINUOUS
Status: DISCONTINUED | OUTPATIENT
Start: 2020-09-01 | End: 2020-09-02

## 2020-09-01 RX ORDER — BUPIVACAINE HYDROCHLORIDE 7.5 MG/ML
INJECTION, SOLUTION INTRASPINAL
Status: COMPLETED | OUTPATIENT
Start: 2020-09-01 | End: 2020-09-01

## 2020-09-01 RX ORDER — HYDROMORPHONE HYDROCHLORIDE 1 MG/ML
0.4 INJECTION, SOLUTION INTRAMUSCULAR; INTRAVENOUS; SUBCUTANEOUS EVERY 5 MIN PRN
Status: DISCONTINUED | OUTPATIENT
Start: 2020-09-01 | End: 2020-09-01 | Stop reason: HOSPADM

## 2020-09-01 RX ORDER — GABAPENTIN 600 MG/1
600 TABLET ORAL ONCE
Status: COMPLETED | OUTPATIENT
Start: 2020-09-01 | End: 2020-09-01

## 2020-09-01 RX ORDER — DIPHENHYDRAMINE HCL 25 MG
25 CAPSULE ORAL EVERY 4 HOURS PRN
Status: ACTIVE | OUTPATIENT
Start: 2020-09-01 | End: 2020-09-02

## 2020-09-01 RX ORDER — DIPHENHYDRAMINE HCL 25 MG
25 CAPSULE ORAL EVERY 4 HOURS PRN
Status: DISCONTINUED | OUTPATIENT
Start: 2020-09-01 | End: 2020-09-02

## 2020-09-01 RX ORDER — MORPHINE SULFATE 4 MG/ML
4 INJECTION, SOLUTION INTRAMUSCULAR; INTRAVENOUS EVERY 10 MIN PRN
Status: DISCONTINUED | OUTPATIENT
Start: 2020-09-01 | End: 2020-09-01 | Stop reason: HOSPADM

## 2020-09-01 RX ORDER — HYDROMORPHONE HYDROCHLORIDE 1 MG/ML
0.6 INJECTION, SOLUTION INTRAMUSCULAR; INTRAVENOUS; SUBCUTANEOUS
Status: ACTIVE | OUTPATIENT
Start: 2020-09-01 | End: 2020-09-02

## 2020-09-01 RX ORDER — ROSUVASTATIN CALCIUM 10 MG/1
10 TABLET, COATED ORAL NIGHTLY
Status: DISCONTINUED | OUTPATIENT
Start: 2020-09-01 | End: 2020-09-02

## 2020-09-01 RX ORDER — GABAPENTIN 100 MG/1
200 CAPSULE ORAL 3 TIMES DAILY
Status: DISCONTINUED | OUTPATIENT
Start: 2020-09-01 | End: 2020-09-02

## 2020-09-01 RX ORDER — HYDROMORPHONE HYDROCHLORIDE 1 MG/ML
0.2 INJECTION, SOLUTION INTRAMUSCULAR; INTRAVENOUS; SUBCUTANEOUS EVERY 5 MIN PRN
Status: DISCONTINUED | OUTPATIENT
Start: 2020-09-01 | End: 2020-09-01 | Stop reason: HOSPADM

## 2020-09-01 RX ORDER — FAMOTIDINE 20 MG/1
20 TABLET ORAL 2 TIMES DAILY
Status: DISCONTINUED | OUTPATIENT
Start: 2020-09-01 | End: 2020-09-02

## 2020-09-01 RX ORDER — HYDROCODONE BITARTRATE AND ACETAMINOPHEN 5; 325 MG/1; MG/1
1 TABLET ORAL EVERY 4 HOURS PRN
Status: DISCONTINUED | OUTPATIENT
Start: 2020-09-01 | End: 2020-09-02

## 2020-09-01 RX ORDER — ACETAMINOPHEN 325 MG/1
650 TABLET ORAL EVERY 4 HOURS PRN
Status: DISCONTINUED | OUTPATIENT
Start: 2020-09-01 | End: 2020-09-02

## 2020-09-01 RX ADMIN — TRANEXAMIC ACID 1000 MG: 10 INJECTION, SOLUTION INTRAVENOUS at 09:43:00

## 2020-09-01 RX ADMIN — SODIUM CHLORIDE, SODIUM LACTATE, POTASSIUM CHLORIDE, CALCIUM CHLORIDE: 600; 310; 30; 20 INJECTION, SOLUTION INTRAVENOUS at 11:20:00

## 2020-09-01 RX ADMIN — MORPHINE SULFATE 0.3 MG: 1 INJECTION, SOLUTION EPIDURAL; INTRATHECAL; INTRAVENOUS at 09:22:00

## 2020-09-01 RX ADMIN — BUPIVACAINE HYDROCHLORIDE 1.6 ML: 7.5 INJECTION, SOLUTION INTRASPINAL at 09:22:00

## 2020-09-01 RX ADMIN — CEFAZOLIN SODIUM/WATER 3 G: 2 G/20 ML SYRINGE (ML) INTRAVENOUS at 09:42:00

## 2020-09-01 RX ADMIN — LIDOCAINE HYDROCHLORIDE 5 ML: 10 INJECTION, SOLUTION INFILTRATION; PERINEURAL at 09:22:00

## 2020-09-01 RX ADMIN — MIDAZOLAM HYDROCHLORIDE 2 MG: 1 INJECTION INTRAMUSCULAR; INTRAVENOUS at 09:19:00

## 2020-09-01 NOTE — ANESTHESIA PROCEDURE NOTES
Spinal Block  Date/Time: 9/1/2020 9:22 AM  Performed by: Ryan Salamanca MD  Authorized by: Ryan Salamanca MD       General Information and Staff    Start Time:  9/1/2020 9:22 AM  End Time:  9/1/2020 9:26 AM  Anesthesiologist:  Ryan Salamanca MD  Perform

## 2020-09-01 NOTE — PROGRESS NOTES
[facility]    Progress Note    Esequiel Van Patient Status:  Inpatient    1960 MRN [id number]   Location [facility] 4W/SW/SE Attending Carla Schuster, *   Hosp Day # 0 PCP Hyacinth Crocker MD        Subjective:    Bowers LIP 27 06/17/2017    PSA 0.4 03/08/2018    DDIMER <0.27 06/13/2017    ESRML 5 01/16/2019    CRP <0.5 01/16/2019    MG 2.3 01/05/2018    TROP 0.00 06/13/2017    TROP 0.00 06/13/2017    B12 411 01/16/2019       Xr Knee (1 Or 2 Views), Left (cpt=73560)    Res

## 2020-09-01 NOTE — HOME CARE LIAISON
Received referral from [facility]. Met with patient at the bedside to discuss home health services and offer choice. Patient is agreeable to [facility] services at discharge. Brochure and contact information provided. Any questions addressed. Will follow.

## 2020-09-01 NOTE — BRIEF OP NOTE
Pre-Operative Diagnosis: primary osteoarthritis of left knee     Post-Operative Diagnosis: primary osteoarthritis of left knee      Procedure Performed:   Procedure(s):  left total knee arthroplasty    Surgeon(s) and Role:     * Katia Austin MD

## 2020-09-01 NOTE — PHYSICAL THERAPY NOTE
PHYSICAL THERAPY KNEE EVALUATION - INPATIENT       Room Number: 425/425-A  Evaluation Date: 9/1/2020  Type of Evaluation: Initial  Physician Order: PT Eval and Treat    Presenting Problem: s/p L TKA  Reason for Therapy: Mobility Dysfunction and Discharge P Supervision    PLAN  PT Treatment Plan: Bed mobility; Body mechanics; Endurance; Energy conservation;Patient education;Gait training;Range of motion;Strengthening;Stair training;Transfer training  Rehab Potential : Good  Frequency (Obs): Daily    PHYSICAL THE are within functional limits     Lower extremity ROM is within functional limits except for the following:   Left Knee extension ~0 deg  Left Knee flexion ~95 deg    Lower extremity strength is within functional limits     BALANCE  Static Sitting: Good  Dy extension  Quad sets  Transfer training    Patient End of Session: Up in chair;Needs met;Call light within reach;RN aware of session/findings; All patient questions and concerns addressed; Family present    CURRENT GOALS    Goals to be met by: 9/8/20  Alexandra

## 2020-09-01 NOTE — CM/SW NOTE
ZAHRA met with the pt. And his wife At bedside. The pt. Lives with his wife in a split level home with the bedrooms upstairs. The pt. Reports being independent prior to admission with adls, ambulation and driving.   The pt's wife also drives, but recently ha

## 2020-09-01 NOTE — ANESTHESIA PREPROCEDURE EVALUATION
Anesthesia PreOp Note    HPI:     Red Licea is a 63year old male who presents for preoperative consultation requested by: Hamzah Iniguez MD    Date of Surgery: 9/1/2020    Procedure(s):  KNEE TOTAL REPLACEMENT  Indication: primary osteoarthri 9/1/2020 at 0530  Rosuvastatin Calcium 10 MG Oral Tab, Take 1 tablet (10 mg total) by mouth nightly., Disp: 90 tablet, Rfl: 4, 8/31/2020 at Unknown time  Metoprolol Tartrate 50 MG Oral Tab, Take 1 tablet (50 mg total) by mouth 2 (two) times daily. , Disp: 1 Never Used    Substance and Sexual Activity      Alcohol use:  Yes        Alcohol/week: 2.0 standard drinks        Types: 2 Standard drinks or equivalent per week      Drug use: No      Sexual activity: Yes        Partners: Female    Lifestyle      Physical 1.01 08/29/2020       Vital Signs: Body mass index is 31.31 kg/m². height is 1.956 m (6' 5\") and weight is 119.7 kg (264 lb). His oral temperature is 98.8 °F (37.1 °C). His blood pressure is 139/73 and his pulse is 64.  His respiration is 18 and oxygen

## 2020-09-01 NOTE — INTERVAL H&P NOTE
Pre-op Diagnosis: primary osteoarthritis of left knee    The above referenced H&P was reviewed by Teodora Fan MD on 9/1/2020, the patient was examined and no significant changes have occurred in the patient's condition since the H&P was performe

## 2020-09-01 NOTE — OPERATIVE REPORT
[facility]    PATIENT'S NAME: Anjum Romana   ATTENDING PHYSICIAN: Virginia Whitney MD   OPERATING PHYSICIAN: Virginia Whitney MD   PATIENT ACCOUNT#:   [id number]    LOCATION:  [facility] [address] PACU [facility] 10  MEDICAL RECORD #:   [id number]       D used.  The patellar cut was made first, and a patella protector was placed over the cut surface of the patella which was then subluxed into the lateral gutter.   The anterior and posterior cruciate ligaments were excised, and the femoral template was placed pinned into place and preparations were made for the stemmed portion of the tibial component. The trochlear recess was cut for the femur. The trial components were then removed, and the bone was prepared with pulsatile lavage.   All three components were

## 2020-09-01 NOTE — PLAN OF CARE
Plan to DC home w [facility] when cleared. Pt A&O.  VSS. CMS (+). RA.  IS.  ASA/SCD. Gas (+). Up w/ walker X 1. Vanc IVABX. Jean to be removed 0600. Pain managed w/ spinal.  Norco available prn. Tolerating diet well w/ no N&V.  Patient has been educated on of falls.   - [place] fall precautions as indicated by assessment.  - Educate pt/family on patient safety including physical limitations  - Instruct pt to call for assistance with activity based on assessment  - Modify environment to reduce risk of injury Plan goals for specific interventions   Outcome: Progressing

## 2020-09-01 NOTE — ANESTHESIA POSTPROCEDURE EVALUATION
Patient: Asher Connelly    Procedure Summary     Date:  09/01/20 Room / Location:  [facility] OR  / [facility] OR    Anesthesia Start:  3589 Anesthesia Stop:      Procedure:  KNEE TOTAL REPLACEMENT (Left ) Diagnosis:  (primary osteoarthritis of left knee)

## 2020-09-02 VITALS
OXYGEN SATURATION: 97 % | HEART RATE: 66 BPM | RESPIRATION RATE: 18 BRPM | DIASTOLIC BLOOD PRESSURE: 71 MMHG | WEIGHT: 264 LBS | TEMPERATURE: 99 F | BODY MASS INDEX: 31.17 KG/M2 | SYSTOLIC BLOOD PRESSURE: 140 MMHG | HEIGHT: 77 IN

## 2020-09-02 LAB
ANION GAP SERPL CALC-SCNC: 4 MMOL/L (ref 0–18)
BASOPHILS # BLD AUTO: 0.03 X10(3) UL (ref 0–0.2)
BASOPHILS NFR BLD AUTO: 0.4 %
BUN BLD-MCNC: 8 MG/DL (ref 7–18)
BUN/CREAT SERPL: 9.8 (ref 10–20)
CALCIUM BLD-MCNC: 8.1 MG/DL (ref 8.5–10.1)
CHLORIDE SERPL-SCNC: 105 MMOL/L (ref 98–112)
CO2 SERPL-SCNC: 29 MMOL/L (ref 21–32)
CREAT BLD-MCNC: 0.82 MG/DL (ref 0.7–1.3)
DEPRECATED RDW RBC AUTO: 42.9 FL (ref 35.1–46.3)
EOSINOPHIL # BLD AUTO: 0.16 X10(3) UL (ref 0–0.7)
EOSINOPHIL NFR BLD AUTO: 2.1 %
ERYTHROCYTE [DISTWIDTH] IN BLOOD BY AUTOMATED COUNT: 13 % (ref 11–15)
GLUCOSE BLD-MCNC: 117 MG/DL (ref 70–99)
HCT VFR BLD AUTO: 38.7 % (ref 39–53)
HGB BLD-MCNC: 13.1 G/DL (ref 13–17.5)
IMM GRANULOCYTES # BLD AUTO: 0.02 X10(3) UL (ref 0–1)
IMM GRANULOCYTES NFR BLD: 0.3 %
LYMPHOCYTES # BLD AUTO: 0.78 X10(3) UL (ref 1–4)
LYMPHOCYTES NFR BLD AUTO: 10.3 %
MCH RBC QN AUTO: 30.5 PG (ref 26–34)
MCHC RBC AUTO-ENTMCNC: 33.9 G/DL (ref 31–37)
MCV RBC AUTO: 90.2 FL (ref 80–100)
MONOCYTES # BLD AUTO: 0.65 X10(3) UL (ref 0.1–1)
MONOCYTES NFR BLD AUTO: 8.6 %
NEUTROPHILS # BLD AUTO: 5.91 X10 (3) UL (ref 1.5–7.7)
NEUTROPHILS # BLD AUTO: 5.91 X10(3) UL (ref 1.5–7.7)
NEUTROPHILS NFR BLD AUTO: 78.3 %
OSMOLALITY SERPL CALC.SUM OF ELEC: 285 MOSM/KG (ref 275–295)
PLATELET # BLD AUTO: 150 10(3)UL (ref 150–450)
POTASSIUM SERPL-SCNC: 4.4 MMOL/L (ref 3.5–5.1)
RBC # BLD AUTO: 4.29 X10(6)UL (ref 4.3–5.7)
SODIUM SERPL-SCNC: 138 MMOL/L (ref 136–145)
WBC # BLD AUTO: 7.6 X10(3) UL (ref 4–11)

## 2020-09-02 PROCEDURE — 99232 SBSQ HOSP IP/OBS MODERATE 35: CPT | Performed by: HOSPITALIST

## 2020-09-02 RX ORDER — CELECOXIB 200 MG/1
200 CAPSULE ORAL EVERY 12 HOURS SCHEDULED
Qty: 60 CAPSULE | Refills: 0 | Status: SHIPPED
Start: 2020-09-02 | End: 2020-09-03

## 2020-09-02 RX ORDER — ASPIRIN 325 MG
325 TABLET ORAL 2 TIMES DAILY
Qty: 60 TABLET | Refills: 0 | Status: SHIPPED
Start: 2020-09-02 | End: 2020-09-03

## 2020-09-02 RX ORDER — MELATONIN
325
Qty: 20 TABLET | Refills: 0 | Status: SHIPPED
Start: 2020-09-03 | End: 2020-09-03

## 2020-09-02 RX ORDER — PSEUDOEPHEDRINE HCL 30 MG
100 TABLET ORAL 2 TIMES DAILY
Qty: 20 CAPSULE | Refills: 0 | Status: SHIPPED
Start: 2020-09-02 | End: 2020-09-03

## 2020-09-02 RX ORDER — HYDROCODONE BITARTRATE AND ACETAMINOPHEN 5; 325 MG/1; MG/1
1 TABLET ORAL EVERY 4 HOURS PRN
Qty: 50 TABLET | Refills: 0 | Status: SHIPPED
Start: 2020-09-02 | End: 2020-09-03

## 2020-09-02 NOTE — DISCHARGE SUMMARY
[place] HOSPITALIST  DISCHARGE SUMMARY     Deo Jacome Patient Status:  Inpatient    1960 MRN [id number]   Location [facility] 4W/SW/SE Attending No att. providers found   Hosp Day # 1 PCP Jae Tim MD     Date of Admission:  Take 2 capsules (200 mg total) by mouth 3 (three) times daily. Quantity:  180 capsule  Refills:  2     Metoprolol Tartrate 50 MG Tabs  Commonly known as:  LOPRESSOR      Take 1 tablet (50 mg total) by mouth 2 (two) times daily.    Quantity:  180 tablet  R after discharge from the hospital. No TCM follow-up needed.         Derik Gaffney MD 9/2/2020    Time spent:  < 30 minutes

## 2020-09-02 NOTE — PHYSICAL THERAPY NOTE
PHYSICAL THERAPY TREATMENT NOTE - INPATIENT     Room Number: 425/425-A       Presenting Problem: s/p L TKA    Problem List  Active Problems:    Primary osteoarthritis of left knee      PHYSICAL THERAPY ASSESSMENT     Patient in chair agree for therapy and have...  -   Turning over in bed (including adjusting bedclothes, sheets and blankets)?: None   -   Sitting down on and standing up from a chair with arms (e.g., wheelchair, bedside commode, etc.): None   -   Moving from lying on back to sitting on the dayron with 2 HR with CGA; platform step with RW Min A   Goal #5 L Knee AROM 0 degrees extension to 95 degrees flexion     Goal #5   Current Status In progress   Goal #6 Patient independently performs home exercise program for ROM/strengthening per the instructio

## 2020-09-02 NOTE — PLAN OF CARE
Patient POD 0-1, A&Ox4. Strong bilat df/pf. ASA and SCDs for VTE prophylaxis. Up 1xRW, LLE WBAT. Tolerating general diet. CPM on for 1 hour during night shift, -2 to 50. Patient tolerated well. Norco for pain control.    Jean draining well, plan to fall injury  Description  INTERVENTIONS:  - Assess pt frequently for physical needs  - Identify cognitive and physical deficits and behaviors that affect risk of falls.   - [place] fall precautions as indicated by assessment.  - Educate pt/family on patie pain be controlled to a 5 or less. Interventions:   - PT, OT, pain meds, non-pharmacologic pain control, education on pain meds.   - See additional Care Plan goals for specific interventions   Outcome: Progressing

## 2020-09-02 NOTE — OCCUPATIONAL THERAPY NOTE
OCCUPATIONAL THERAPY EVALUATION - INPATIENT     Room Number: 425/425-A  Evaluation Date: 9/2/2020  Type of Evaluation: Quick Eval  Presenting Problem: OA of L knee    Physician Order: IP Consult to Occupational Therapy  Reason for Therapy: ADL/IADL Dysfu List  Active Problems:    Primary osteoarthritis of left knee      Past Medical History  Past Medical History:   Diagnosis Date   • [place] coronary artery calcium score between 100 and 199 8/20/2020   • Arrhythmia     A_Fib intermittently   • ASD (atrial ‘6-Clicks’ Inpatient Daily Activity Short Form  How much help from another person does the patient currently need…  -   Putting on and taking off regular lower body clothing?: A Little  -   Bathing (including washing, rinsing, drying)?: A Little  -   Toile

## 2020-09-02 NOTE — PROGRESS NOTES
[address] Patient Status:  Inpatient    1960 MRN [id number]   Location [facility] 4W/SW/SE Attending Jane Hughes MD   Hosp Day # 1 PCP Agnes Morin MD     Subjective:  Post-Operative Day: 1 Status Post righ

## 2020-09-02 NOTE — PLAN OF CARE
Pt A&O.  VSS. CMS (+). RA.  IS.  ASA/SCD/LEVON. Gas (+). Up w/ walker X 1. Pt check void. Pain managed w/ norco.  Tolerating diet well w/ no N&V. Saline Locked. Walker vended. Awaiting clearance and scripts from ortho.        Problem: Patient Centered injury  Description  INTERVENTIONS:  - Assess pt frequently for physical needs  - Identify cognitive and physical deficits and behaviors that affect risk of falls.   - [place] fall precautions as indicated by assessment.  - Educate pt/family on patient sa Goal: My goal is to have my pain be controlled to a 5 or less. Interventions:   - PT, OT, pain meds, non-pharmacologic pain control, education on pain meds.   - See additional Care Plan goals for specific interventions   Outcome: Adequate for Discharge

## 2020-09-03 ENCOUNTER — TELEPHONE (OUTPATIENT)
Dept: ORTHOPEDICS CLINIC | Facility: CLINIC | Age: 60
End: 2020-09-03

## 2020-09-03 RX ORDER — PSEUDOEPHEDRINE HCL 30 MG
100 TABLET ORAL 2 TIMES DAILY
Qty: 20 CAPSULE | Refills: 0 | Status: SHIPPED | OUTPATIENT
Start: 2020-09-03 | End: 2020-10-21

## 2020-09-03 RX ORDER — ASPIRIN 325 MG
325 TABLET ORAL 2 TIMES DAILY
Qty: 60 TABLET | Refills: 0 | Status: SHIPPED | OUTPATIENT
Start: 2020-09-03 | End: 2020-09-03

## 2020-09-03 RX ORDER — HYDROCODONE BITARTRATE AND ACETAMINOPHEN 5; 325 MG/1; MG/1
1 TABLET ORAL EVERY 4 HOURS PRN
Qty: 50 TABLET | Refills: 0 | Status: SHIPPED | OUTPATIENT
Start: 2020-09-03 | End: 2020-09-03

## 2020-09-03 RX ORDER — PSEUDOEPHEDRINE HCL 30 MG
100 TABLET ORAL 2 TIMES DAILY
Qty: 20 CAPSULE | Refills: 0 | Status: SHIPPED | OUTPATIENT
Start: 2020-09-03 | End: 2020-09-03

## 2020-09-03 RX ORDER — CELECOXIB 200 MG/1
200 CAPSULE ORAL EVERY 12 HOURS SCHEDULED
Qty: 60 CAPSULE | Refills: 0 | Status: SHIPPED | OUTPATIENT
Start: 2020-09-03 | End: 2020-09-08

## 2020-09-03 RX ORDER — HYDROCODONE BITARTRATE AND ACETAMINOPHEN 5; 325 MG/1; MG/1
1 TABLET ORAL EVERY 4 HOURS PRN
Qty: 50 TABLET | Refills: 0 | Status: SHIPPED | OUTPATIENT
Start: 2020-09-03 | End: 2021-04-10

## 2020-09-03 RX ORDER — CELECOXIB 200 MG/1
200 CAPSULE ORAL EVERY 12 HOURS SCHEDULED
Qty: 60 CAPSULE | Refills: 0 | Status: SHIPPED | OUTPATIENT
Start: 2020-09-03 | End: 2020-09-03

## 2020-09-03 RX ORDER — MELATONIN
325
Qty: 20 TABLET | Refills: 0 | Status: SHIPPED | OUTPATIENT
Start: 2020-09-03 | End: 2020-09-03

## 2020-09-03 RX ORDER — ASPIRIN 325 MG
325 TABLET ORAL 2 TIMES DAILY
Qty: 60 TABLET | Refills: 0 | Status: SHIPPED | OUTPATIENT
Start: 2020-09-03 | End: 2021-04-10 | Stop reason: DRUGHIGH

## 2020-09-03 RX ORDER — MELATONIN
325
Qty: 20 TABLET | Refills: 0 | Status: SHIPPED | OUTPATIENT
Start: 2020-09-03 | End: 2020-10-21

## 2020-09-03 NOTE — TELEPHONE ENCOUNTER
Called [facility] in [place] and spoke to Anand who states that they have not received any of the 5 medications ordered by Francisco Javier Almaguer, [address], yesterday and today. LAURENCE Crump/Dr. Deborah Ace please see message. Please reorder medications for pt.

## 2020-09-03 NOTE — TELEPHONE ENCOUNTER
Wife states that pts Rx's have not been sent to [facility] on [address] and [employer] in [place].

## 2020-09-03 NOTE — TELEPHONE ENCOUNTER
[facility] and was on hold for several minutes. Spoke to wife of pt and she has not gotten call from [place] yet about Rx's. Informed wife that Rx's were sent to [facility] and confirmation was received.  Wife to call [facility] and

## 2020-09-03 NOTE — TELEPHONE ENCOUNTER
Spouse calling in states none of the medication were sent in to pharmacy asking can they sent again please advise       ferrous sulfate 325 (65 FE) MG Oral Tab EC 20 tablet 0 9/3/2020    Sig:   Take 1 tablet (325 mg total) by mouth daily with breakfast.

## 2020-09-03 NOTE — TELEPHONE ENCOUNTER
Called [facility] in [place] and spoke to pharmacy tech who states they did receive all 5 Rx's today but, due to pt's insurance, cannot get Rx's from [facility].     Spoke to wife of pt and discussed message above with her. Pt prefers [facility] in [place].  Informed wife

## 2020-09-03 NOTE — TELEPHONE ENCOUNTER
Pt's wife called stating pt's [facility].  [facility]er requesting [facility]. Call transferred to the nurse.

## 2020-09-08 RX ORDER — CELECOXIB 200 MG/1
CAPSULE ORAL
Qty: 180 CAPSULE | Refills: 0 | Status: SHIPPED | OUTPATIENT
Start: 2020-09-08 | End: 2021-04-10

## 2020-09-17 ENCOUNTER — TELEPHONE (OUTPATIENT)
Dept: ORTHOPEDICS CLINIC | Facility: CLINIC | Age: 60
End: 2020-09-17

## 2020-09-17 DIAGNOSIS — Z96.652 S/P TOTAL KNEE ARTHROPLASTY, LEFT: Primary | ICD-10-CM

## 2020-09-17 NOTE — TELEPHONE ENCOUNTER
Patient was informed by home p/t that he needs to schedule at a remote location. Patient is asking where Dr. Mary Lou Bhatt wants to send him.  Please advise

## 2020-09-17 NOTE — TELEPHONE ENCOUNTER
S/w pt. He had his knee replaced 2 wks ago. He had HH PT ordered, only for 4 visits. When he asked the therapist where he should go for the rest of his physical therapy, he was told that Dr Don Deal like to set it up himself.      Dr Steinberg/Jose Raul: where Peri Sorensen

## 2020-09-23 ENCOUNTER — HOSPITAL ENCOUNTER (OUTPATIENT)
Dept: GENERAL RADIOLOGY | Facility: HOSPITAL | Age: 60
Discharge: HOME | End: 2020-09-23
Attending: ORTHOPAEDIC SURGERY
Payer: COMMERCIAL

## 2020-09-23 ENCOUNTER — OFFICE VISIT (OUTPATIENT)
Dept: ORTHOPEDICS CLINIC | Facility: CLINIC | Age: 60
End: 2020-09-23
Payer: COMMERCIAL

## 2020-09-23 VITALS — WEIGHT: 257 LBS | BODY MASS INDEX: 30 KG/M2

## 2020-09-23 DIAGNOSIS — Z47.89 ORTHOPEDIC AFTERCARE: Primary | ICD-10-CM

## 2020-09-23 DIAGNOSIS — Z47.89 ORTHOPEDIC AFTERCARE: ICD-10-CM

## 2020-09-23 PROCEDURE — 1111F DSCHRG MED/CURRENT MED MERGE: CPT | Performed by: ORTHOPAEDIC SURGERY

## 2020-09-23 PROCEDURE — 73562 X-RAY EXAM OF KNEE 3: CPT | Performed by: ORTHOPAEDIC SURGERY

## 2020-09-23 PROCEDURE — 99024 POSTOP FOLLOW-UP VISIT: CPT | Performed by: ORTHOPAEDIC SURGERY

## 2020-09-23 NOTE — PROGRESS NOTES
NURSING INTAKE COMMENTS: Patient presents with:  Post-Op      HPI: This 63year old male presents today for follow-up of his left knee replacement. His surgery was 3 weeks ago. He completed his course of outpatient therapy last week.   He came in today wi nightly. 90 tablet 4   • Metoprolol Tartrate 50 MG Oral Tab Take 1 tablet (50 mg total) by mouth 2 (two) times daily.  180 tablet 4     No Known Allergies  Family History   Problem Relation Age of Onset   • Heart Disorder Mother 28        Heart attack   • C HGB 13.1 09/02/2020    .0 09/02/2020      Lab Results   Component Value Date     (H) 09/02/2020    BUN 8 09/02/2020    CREATSERUM 0.82 09/02/2020    GFRNAA 95 09/02/2020    GFRAA 110 09/02/2020        Assessment and Plan:  Diagnoses and all o

## 2020-09-28 ENCOUNTER — OFFICE VISIT (OUTPATIENT)
Dept: PHYSICAL THERAPY | Age: 60
End: 2020-09-28
Attending: PHYSICIAN ASSISTANT
Payer: COMMERCIAL

## 2020-09-28 DIAGNOSIS — Z96.652 S/P TOTAL KNEE ARTHROPLASTY, LEFT: ICD-10-CM

## 2020-09-28 PROCEDURE — 97110 THERAPEUTIC EXERCISES: CPT

## 2020-09-28 PROCEDURE — 97162 PT EVAL MOD COMPLEX 30 MIN: CPT

## 2020-09-28 NOTE — PROGRESS NOTES
PHYSICAL THERAPY EVALUATION:     Referring Physician: Dr. Brianne Llamas  Diagnosis:  S/P total knee arthroplasty, left (K22.050)  Date of onset: 9/1/20 Date of Service: 9/28/2020     PATIENT SUMMARY   Kaitlynn Yanes is a 63year old male who presents to therapy Are you being hurt, frightened, demeaned, or taken advantage of by anyone at your home or in your life? No      Have you recently had thoughts of hurting yourself?   No    Have you tried to hurt yourself in the past?  No        Pertaining Imaging: pre diag Upon palpation decreased patella mobility  Noted but minimal to no  tenderness at this time    Precautions:  None  OBJECTIVE:   Observation/Transfers:  Mod I  Gait:  pt ambulates on level ground with antalgia, decreased step length LLE and decreased stance PLAN OF CARE:    Goals: (to be met in 12 visits)  1. Pt will be I with HEP,its progression and management of symptoms to continue with gains in therapy.   2. Pt will demonstrate improved strength on LLE muscles graded below 5/5 to half a grade or better to X___________________________________________________ Date____________________    Certification From: 9/15/0889  To:12/27/2020

## 2020-10-02 ENCOUNTER — OFFICE VISIT (OUTPATIENT)
Dept: PHYSICAL THERAPY | Age: 60
End: 2020-10-02
Attending: PHYSICIAN ASSISTANT
Payer: COMMERCIAL

## 2020-10-02 PROCEDURE — 97110 THERAPEUTIC EXERCISES: CPT

## 2020-10-02 NOTE — PROGRESS NOTES
Dx: S/P total knee arthroplasty, left (V75.765)       Insurance   PPO     POC# of visits: 12       Authorized  # visits by insurance  :12   Expiration date :12/27/20  Authorizing Physician: Dr. Geeta Loving MD visit: Oct 21  Fall Risk: standard         Pre 2x10  Shuttle R/L LE 4 bands 2x10  SLS on foam taps x10 x3 cones x 2 sets  Step ups 6\" forward and sideways 2x10  TM retro x.8 mph x6 mins         Theraex: Theraex: Theraex: Theraex:   Manual: Manual: Manual: Manual: Manual:   Gait/NMRed: Gait/NMRed: Gait

## 2020-10-05 ENCOUNTER — OFFICE VISIT (OUTPATIENT)
Dept: PHYSICAL THERAPY | Age: 60
End: 2020-10-05
Attending: PHYSICIAN ASSISTANT
Payer: COMMERCIAL

## 2020-10-05 PROCEDURE — 97110 THERAPEUTIC EXERCISES: CPT

## 2020-10-05 NOTE — PROGRESS NOTES
Dx: S/P total knee arthroplasty, left (U52.519)       Insurance   PPO     POC# of visits: 12       Authorized  # visits by insurance  :12   Expiration date :12/27/20  Authorizing Physician: Dr. Nick Hernandez  Next MD visit: Oct 21  Fall Risk: standard         Pre level x5 mins  QS 10 secs x10  Heel slides x10  SLR 2x10 with QS  SL hip abd 2x10  Shuttle BLE 6 bands 2x10  Shuttle R/L LE 4 bands 2x10  SLS on foam taps x10 x3 cones x 2 sets  Step ups 6\" forward and sideways 2x10  TM retro x.8 mph x6 mins         Thera

## 2020-10-08 ENCOUNTER — OFFICE VISIT (OUTPATIENT)
Dept: PHYSICAL THERAPY | Age: 60
End: 2020-10-08
Attending: PHYSICIAN ASSISTANT
Payer: COMMERCIAL

## 2020-10-08 PROCEDURE — 97110 THERAPEUTIC EXERCISES: CPT

## 2020-10-08 NOTE — PROGRESS NOTES
Dx: S/P total knee arthroplasty, left (C67.960)       Insurance   PPO     POC# of visits: 12       Authorized  # visits by insurance  :12   Expiration date :12/27/20  Authorizing Physician: Dr. Nick Hernandez  Next MD visit: Oct 21  Fall Risk: standard         Pre level x5 mins  QS 10 secs x10  Heel slides x10  SLR 2x10 with QS  SL hip abd 2x10  Shuttle BLE 6 bands 2x10  Shuttle R/L LE 4 bands 2x10  SLS on foam taps x10 x3 cones x 2 sets  Step ups 6\" forward and sideways 2x10  TM retro x.8 mph x6 mins         Thera

## 2020-10-12 ENCOUNTER — OFFICE VISIT (OUTPATIENT)
Dept: PHYSICAL THERAPY | Age: 60
End: 2020-10-12
Attending: PHYSICIAN ASSISTANT
Payer: COMMERCIAL

## 2020-10-12 PROCEDURE — 97110 THERAPEUTIC EXERCISES: CPT

## 2020-10-12 NOTE — PROGRESS NOTES
Dx: S/P total knee arthroplasty, left (Y39.189)       Insurance   PPO     POC# of visits: 12       Authorized  # visits by insurance  :12   Expiration date :12/27/20  Authorizing Physician: Dr. Johnathon Loving MD visit: Oct 21  Fall Risk: standard         Pre Done Date:    Tx#: 6/   Theraex:  NU step level x5 mins  QS 10 secs x10  Heel slides x10  SLR 2x10 with QS  SL hip abd 2x10  Shuttle BLE 6 bands 2x10  Shuttle R/L LE 4 bands 2x10  SLS on foam taps x10 x3 cones x 2 sets  Step ups 6\" forward and sideways 2x1

## 2020-10-14 ENCOUNTER — OFFICE VISIT (OUTPATIENT)
Dept: PHYSICAL THERAPY | Age: 60
End: 2020-10-14
Attending: PHYSICIAN ASSISTANT
Payer: COMMERCIAL

## 2020-10-14 PROCEDURE — 97110 THERAPEUTIC EXERCISES: CPT

## 2020-10-14 NOTE — PROGRESS NOTES
Dx: S/P total knee arthroplasty, left (M72.730)         Insurance   PPO     POC# of visits: 12       Authorized  # visits by insurance  :12   Expiration date :12/27/20  Authorizing Physician: Dr. Anastasia Scott  Next MD visit: Oct 21  Fall Risk: standard         P TX#: 4/12 Date: 10/12/20       TX#: 5/12  FOTO: Done Date: 10/14/20  Tx#: 6/12    Theraex:  NU step level x5 mins  QS 10 secs x10  Heel slides x10  SLR 2x10 with QS  SL hip abd 2x10  Shuttle BLE 6 bands 2x10  Shuttle R/L LE 4 bands 2x10  SLS on foam taps x Gait/NMRed: Gait/NMRed:  BB taps A/P M/L x20 ea  BB to balance A/P M/L x 3 trials ea  Bosu fwd/lat lunges 2x10 ea     Gait/NMRed:  -BB taps A/P M/L x20 ea  -BB to balance A/P M/L x 3 trials ea Gait/NMRed:  -BB taps A/P M/L x20 ea  -BB to balance A/P M/L x

## 2020-10-19 ENCOUNTER — OFFICE VISIT (OUTPATIENT)
Dept: PHYSICAL THERAPY | Age: 60
End: 2020-10-19
Attending: PHYSICIAN ASSISTANT
Payer: COMMERCIAL

## 2020-10-19 PROCEDURE — 97110 THERAPEUTIC EXERCISES: CPT

## 2020-10-19 NOTE — PROGRESS NOTES
Dx: S/P total knee arthroplasty, left (O25.331)         Insurance   PPO     POC# of visits: 12       Authorized  # visits by insurance  :12   Expiration date :12/27/20  Authorizing Physician: Dr. 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  Pt will demonstrate increased LLE SLS to improve gait on various surfaces and -Stair hamstring stretch 3 x30 sec hold  -Mini squats x20  -8 inch fwd/lat step x20  -6 inch anterior step x20  -Shuttle BLE 8 bands 2x10  -Shuttle SL 6 bands 2x10  -Airex SLS cone touches 3 x10  -QS 10 secs x10  -Heel slides x10  -SLR with QS 2x10   -SL h

## 2020-10-21 ENCOUNTER — OFFICE VISIT (OUTPATIENT)
Dept: ORTHOPEDICS CLINIC | Facility: CLINIC | Age: 60
End: 2020-10-21
Payer: COMMERCIAL

## 2020-10-21 ENCOUNTER — OFFICE VISIT (OUTPATIENT)
Dept: PHYSICAL THERAPY | Age: 60
End: 2020-10-21
Attending: PHYSICIAN ASSISTANT
Payer: COMMERCIAL

## 2020-10-21 VITALS — HEIGHT: 76 IN | WEIGHT: 260 LBS | BODY MASS INDEX: 31.66 KG/M2

## 2020-10-21 DIAGNOSIS — Z47.89 ORTHOPEDIC AFTERCARE: Primary | ICD-10-CM

## 2020-10-21 PROCEDURE — 97110 THERAPEUTIC EXERCISES: CPT

## 2020-10-21 PROCEDURE — 99024 POSTOP FOLLOW-UP VISIT: CPT | Performed by: ORTHOPAEDIC SURGERY

## 2020-10-21 PROCEDURE — 3008F BODY MASS INDEX DOCD: CPT | Performed by: ORTHOPAEDIC SURGERY

## 2020-10-21 RX ORDER — AMOXICILLIN 500 MG/1
CAPSULE ORAL
Qty: 4 CAPSULE | Refills: 3 | Status: SHIPPED | OUTPATIENT
Start: 2020-10-21 | End: 2021-04-10

## 2020-10-21 NOTE — PROGRESS NOTES
NURSING INTAKE COMMENTS: Patient presents with:  Post-Op: s/p left TKA -- Sx on 09/01/20. Rates pain 0/10. Going to PT. Denies any calf pain or fever. HPI: This 63year old male presents today with follow-up of his left total knee replacement.   He re Take 1 tablet by mouth every 4 (four) hours as needed.  (Patient not taking: Reported on 10/21/2020 ) 50 tablet 0     No Known Allergies  Family History   Problem Relation Age of Onset   • Heart Disorder Mother 28        Heart attack   • Cancer Father up left knee replacement 9/1/2020  TECHNIQUE: 3 views were obtained. FINDINGS:  BONES: Postsurgical changes of left knee arthroplasty with radiographically intact components. No acute fracture. SOFT TISSUES: Mild prepatellar soft tissue swelling.  EFFUSI

## 2020-10-21 NOTE — PROGRESS NOTES
ProgressSummary  Pt has attended 8 visits in Physical Therapy   .  Dx: S/P total knee arthroplasty, left (Y63.177)         Insurance   PPO     POC# of visits: 12       Authorized  # visits by insurance  :12   Expiration date :12/27/20  Authorizing Physici Comfort Covington has the noted but improved gait deviations and asymmetries  with no compensation when managing stairs. Comfort C[place]ton demonstrated improved strength with LLE. AROM now [place] and mainly painfree. Pt noted to have improved but still limited to 10 secs balance / SL FOTO: Done Date: 10/14/20  Tx#: 6/12 10/19/20  Tx:7/12   Theraex:  Recumbent bike level 3 x6 mins  Standing hip exercises x2 x10 :flex/abd/ext GTB  bosu to trampoline lunges 2x10  Lateral lunges with 4# ball 2x10  SLS 2# 2x10 x3 directions  Heel walks with Reassessed  SLR 2.5# 2x10  SL hip abd 2.5# 2x10  Prone knee flexion 2.5# 2x10  prone hip extension 2.5# 2x10  Shuttle BLE 8 bands 2x10, Heel raises x20  -Shuttle SL 7 bands 2x10  Prone knee flexion from 90 degrees  Standing TKE's GTB 10 secs QS x10  Seated [de-identified] certification required:  Yes  Please co-sign or sign and return this letter via fax as soon as possible to [phone]. I certify the need for these services furnished under this plan of treatment and while under my care.     X_______________

## 2020-10-26 ENCOUNTER — APPOINTMENT (OUTPATIENT)
Dept: PHYSICAL THERAPY | Age: 60
End: 2020-10-26
Attending: PHYSICIAN ASSISTANT
Payer: COMMERCIAL

## 2020-10-27 ENCOUNTER — TELEPHONE (OUTPATIENT)
Dept: PHYSICAL THERAPY | Age: 60
End: 2020-10-27

## 2020-10-28 ENCOUNTER — APPOINTMENT (OUTPATIENT)
Dept: PHYSICAL THERAPY | Age: 60
End: 2020-10-28
Attending: PHYSICIAN ASSISTANT
Payer: COMMERCIAL

## 2020-12-08 RX ORDER — GABAPENTIN 100 MG/1
200 CAPSULE ORAL 3 TIMES DAILY
Qty: 180 CAPSULE | Refills: 1 | Status: SHIPPED | OUTPATIENT
Start: 2020-12-08 | End: 2021-02-08

## 2020-12-08 NOTE — TELEPHONE ENCOUNTER
[facility] requesting Gabapentin refill per patient request--no refills available from 8/21/2020 Rx    Med pended    Please advise

## 2020-12-31 RX ORDER — ROSUVASTATIN CALCIUM 10 MG/1
10 TABLET, COATED ORAL NIGHTLY
Qty: 90 TABLET | Refills: 1 | Status: SHIPPED | OUTPATIENT
Start: 2020-12-31 | End: 2021-06-24

## 2020-12-31 NOTE — TELEPHONE ENCOUNTER
Rx request for Rosuvastatin Calcium 10 mg PASSED Cholesterol protocol. rx filled per protocol.     Cholesterol Medications  Protocol Criteria:  · Appointment scheduled with Cardiology in the past 12 months or in the next 3 months  · ALT, AST & LDL on file i

## 2021-01-21 RX ORDER — METOPROLOL TARTRATE 50 MG/1
50 TABLET, FILM COATED ORAL 2 TIMES DAILY
Qty: 180 TABLET | Refills: 1 | Status: SHIPPED | OUTPATIENT
Start: 2021-01-21 | End: 2021-10-11

## 2021-02-08 RX ORDER — GABAPENTIN 100 MG/1
CAPSULE ORAL
Qty: 180 CAPSULE | Refills: 1 | Status: SHIPPED | OUTPATIENT
Start: 2021-02-08 | End: 2021-04-05

## 2021-04-05 NOTE — TELEPHONE ENCOUNTER
Current Outpatient Medications   Medication Sig Dispense Refill   • GABAPENTIN 100 MG Oral Cap TAKE 2 CAPSULES(200 MG) BY MOUTH THREE TIMES DAILY 180 capsule 1

## 2021-04-06 RX ORDER — GABAPENTIN 100 MG/1
200 CAPSULE ORAL 3 TIMES DAILY
Qty: 180 CAPSULE | Refills: 1 | Status: SHIPPED | OUTPATIENT
Start: 2021-04-06 | End: 2021-06-09

## 2021-04-06 NOTE — TELEPHONE ENCOUNTER
Please schedule a phone visit, video visit or in person visit.     RAOUL Calderon with Dr. Jamie Truong

## 2021-04-10 ENCOUNTER — LAB ENCOUNTER (OUTPATIENT)
Dept: LAB | Facility: HOSPITAL | Age: 61
End: 2021-04-10
Attending: INTERNAL MEDICINE
Payer: COMMERCIAL

## 2021-04-10 ENCOUNTER — OFFICE VISIT (OUTPATIENT)
Dept: INTERNAL MEDICINE CLINIC | Facility: CLINIC | Age: 61
End: 2021-04-10
Payer: COMMERCIAL

## 2021-04-10 VITALS
HEIGHT: 76 IN | WEIGHT: 277 LBS | RESPIRATION RATE: 20 BRPM | DIASTOLIC BLOOD PRESSURE: 74 MMHG | SYSTOLIC BLOOD PRESSURE: 118 MMHG | BODY MASS INDEX: 33.73 KG/M2 | HEART RATE: 66 BPM

## 2021-04-10 DIAGNOSIS — I48.0 PAROXYSMAL ATRIAL FIBRILLATION (HCC): ICD-10-CM

## 2021-04-10 DIAGNOSIS — Z00.00 ROUTINE PHYSICAL EXAMINATION: Primary | ICD-10-CM

## 2021-04-10 DIAGNOSIS — Z96.652 STATUS POST TOTAL LEFT KNEE REPLACEMENT: ICD-10-CM

## 2021-04-10 DIAGNOSIS — Z00.00 ROUTINE PHYSICAL EXAMINATION: ICD-10-CM

## 2021-04-10 DIAGNOSIS — G62.9 PERIPHERAL POLYNEUROPATHY: ICD-10-CM

## 2021-04-10 PROCEDURE — 3008F BODY MASS INDEX DOCD: CPT | Performed by: INTERNAL MEDICINE

## 2021-04-10 PROCEDURE — 3078F DIAST BP <80 MM HG: CPT | Performed by: INTERNAL MEDICINE

## 2021-04-10 PROCEDURE — 99396 PREV VISIT EST AGE 40-64: CPT | Performed by: INTERNAL MEDICINE

## 2021-04-10 PROCEDURE — 85025 COMPLETE CBC W/AUTO DIFF WBC: CPT

## 2021-04-10 PROCEDURE — 3074F SYST BP LT 130 MM HG: CPT | Performed by: INTERNAL MEDICINE

## 2021-04-10 PROCEDURE — 80061 LIPID PANEL: CPT

## 2021-04-10 PROCEDURE — 36415 COLL VENOUS BLD VENIPUNCTURE: CPT

## 2021-04-10 PROCEDURE — 84443 ASSAY THYROID STIM HORMONE: CPT

## 2021-04-10 PROCEDURE — 82607 VITAMIN B-12: CPT

## 2021-04-10 PROCEDURE — 80053 COMPREHEN METABOLIC PANEL: CPT

## 2021-04-10 RX ORDER — ASPIRIN 81 MG/1
81 TABLET ORAL DAILY
Qty: 1 TABLET | Refills: 0 | Status: SHIPPED | OUTPATIENT
Start: 2021-04-10

## 2021-04-10 NOTE — PROGRESS NOTES
HPI:    Patient ID: Harriet Fernandez is a 63year old male. HPI  Patient is here requesting a physical exam.  Last seen here in August of last year for preoperative evaluation prior to left total knee replacement.   At that time he had lost 22 pounds and hi MAIN OR   • LAPAROSCOPIC CHOLECYSTECTOMY     • LAPAROSCOPIC CHOLECYSTECTOMY N/A 6/16/2017    Performed by Jonathan Bryant MD at [address] [facility] MAIN OR   • REPR ASD OSTIUM [address] approx      Family History   Problem Relation Age of Onset   • Heart Disorder Mo Pulmonary effort is normal.      Breath sounds: Normal breath sounds. No wheezing or rales. Abdominal:      General: Bowel sounds are normal.      Palpations: Abdomen is soft. There is no mass. Tenderness: There is no abdominal tenderness.       Avery total left knee replacement  Patient is done quite well following the knee replacement. Encouraged to continue with activity.   His aspirin was increased from 81 mg a day up to 325 mg twice a day when he was released from the hospital.  That was just for t

## 2021-05-20 ENCOUNTER — PROCEDURE VISIT (OUTPATIENT)
Dept: NEUROLOGY | Facility: CLINIC | Age: 61
End: 2021-05-20
Payer: COMMERCIAL

## 2021-05-20 DIAGNOSIS — R20.2 PARESTHESIAS: ICD-10-CM

## 2021-05-20 PROCEDURE — 95886 MUSC TEST DONE W/N TEST COMP: CPT | Performed by: OTHER

## 2021-05-20 PROCEDURE — 95909 NRV CNDJ TST 5-6 STUDIES: CPT | Performed by: OTHER

## 2021-05-21 NOTE — PROCEDURES
[address]  Phone- [phone]  Nerve Conduction & Electromyography Report            Patient: Gloria Dang  Patient ID: [id number]  Sex:  Male  YOB: 1960  Age: 57 Year None None N N N N   L. VAST MEDIALIS N None None None None N N N N   L. LUMB PSP (L) N None None None None N N N N   L. LUMB PSP (M) N None None None None N N N N   R. LUMB PSP (L) N None None None None N N N N   R. LUMB PSP (M) N None None None None N N N

## 2021-05-25 ENCOUNTER — TELEPHONE (OUTPATIENT)
Dept: NEUROLOGY | Facility: CLINIC | Age: 61
End: 2021-05-25

## 2021-05-25 DIAGNOSIS — G62.9 PERIPHERAL POLYNEUROPATHY: Primary | ICD-10-CM

## 2021-05-25 NOTE — TELEPHONE ENCOUNTER
----- Message from Tasia Frausto MD sent at 5/25/2021 10:13 AM CDT -----  Study shows axonal asymmetric sensorimotor polyneuropathy.  Will repeat blood work to assess for neuropathy causes

## 2021-05-26 NOTE — TELEPHONE ENCOUNTER
Spoke to patient, advised Dr Thania Worrell has placed lab orders. He will have drawn, no further questions.

## 2021-05-26 NOTE — TELEPHONE ENCOUNTER
Spoke to patient and notified him of below. He was understanding and will proceed with labs once they are placed.

## 2021-05-28 ENCOUNTER — LAB ENCOUNTER (OUTPATIENT)
Dept: LAB | Age: 61
End: 2021-05-28
Attending: Other
Payer: COMMERCIAL

## 2021-05-28 DIAGNOSIS — G62.9 PERIPHERAL POLYNEUROPATHY: ICD-10-CM

## 2021-05-28 PROCEDURE — 86334 IMMUNOFIX E-PHORESIS SERUM: CPT

## 2021-05-28 PROCEDURE — 83883 ASSAY NEPHELOMETRY NOT SPEC: CPT

## 2021-05-28 PROCEDURE — 36415 COLL VENOUS BLD VENIPUNCTURE: CPT

## 2021-05-28 PROCEDURE — 83036 HEMOGLOBIN GLYCOSYLATED A1C: CPT

## 2021-05-28 PROCEDURE — 84165 PROTEIN E-PHORESIS SERUM: CPT

## 2021-06-02 ENCOUNTER — TELEPHONE (OUTPATIENT)
Dept: NEUROLOGY | Facility: CLINIC | Age: 61
End: 2021-06-02

## 2021-06-02 NOTE — TELEPHONE ENCOUNTER
----- Message from Paul Cohen MD sent at 6/2/2021 12:21 PM CDT -----  Serum electrophoresis test is negative.

## 2021-06-02 NOTE — TELEPHONE ENCOUNTER
----- Message from Sun Cannon MD sent at 6/2/2021 12:21 PM CDT -----  Serum electrophoresis test is negative.

## 2021-06-09 RX ORDER — GABAPENTIN 100 MG/1
200 CAPSULE ORAL 3 TIMES DAILY
Qty: 180 CAPSULE | Refills: 1 | Status: SHIPPED | OUTPATIENT
Start: 2021-06-09 | End: 2021-08-12

## 2021-06-09 NOTE — TELEPHONE ENCOUNTER
Current Outpatient Medications   Medication Sig Dispense Refill   • gabapentin 100 MG Oral Cap Take 2 capsules (200 mg total) by mouth 3 (three) times daily.  180 capsule 1

## 2021-06-24 RX ORDER — ROSUVASTATIN CALCIUM 10 MG/1
TABLET, COATED ORAL
Qty: 90 TABLET | Refills: 1 | Status: SHIPPED | OUTPATIENT
Start: 2021-06-24 | End: 2021-10-18

## 2021-06-24 NOTE — TELEPHONE ENCOUNTER
LOV with DR. Jones 8/20/20  Meets refill protocol below. Taking per LOV and recent labs. Refills sent per protocol.

## 2021-08-12 RX ORDER — GABAPENTIN 100 MG/1
200 CAPSULE ORAL 3 TIMES DAILY
Qty: 540 CAPSULE | Refills: 1 | Status: SHIPPED | OUTPATIENT
Start: 2021-08-12

## 2021-08-12 NOTE — TELEPHONE ENCOUNTER
Refill passed per [facility] protocol.   Requested Prescriptions   Pending Prescriptions Disp Refills    GABAPENTIN 100 MG Oral Cap [Pharmacy Med Name: GABAPENTIN 100MG CAPSULES] 180 capsule 1     Sig: TAKE 2 CAPSULES(200 MG) BY MOUTH THREE TIMES DAILY

## 2021-09-28 ENCOUNTER — OFFICE VISIT (OUTPATIENT)
Dept: NEUROLOGY | Facility: CLINIC | Age: 61
End: 2021-09-28
Payer: COMMERCIAL

## 2021-09-28 ENCOUNTER — TELEPHONE (OUTPATIENT)
Dept: NEUROLOGY | Facility: CLINIC | Age: 61
End: 2021-09-28

## 2021-09-28 VITALS
WEIGHT: 277 LBS | HEIGHT: 76 IN | BODY MASS INDEX: 33.73 KG/M2 | SYSTOLIC BLOOD PRESSURE: 120 MMHG | DIASTOLIC BLOOD PRESSURE: 80 MMHG

## 2021-09-28 DIAGNOSIS — G62.9 PERIPHERAL POLYNEUROPATHY: Primary | ICD-10-CM

## 2021-09-28 PROCEDURE — 99213 OFFICE O/P EST LOW 20 MIN: CPT | Performed by: OTHER

## 2021-09-28 PROCEDURE — 3074F SYST BP LT 130 MM HG: CPT | Performed by: OTHER

## 2021-09-28 PROCEDURE — 3008F BODY MASS INDEX DOCD: CPT | Performed by: OTHER

## 2021-09-28 PROCEDURE — 3079F DIAST BP 80-89 MM HG: CPT | Performed by: OTHER

## 2021-09-28 NOTE — PROGRESS NOTES
HPI:    Patient ID: Gloria Dang is a 57year old male. PCP: Dr Chayito Lopez      HPI    Patient presented for follow-up from her patient presented for follow-up for neuropathy and to discuss the test result. He reports his symptoms remains the same.   He was Heart Disorder Mother 28        Heart attack   • Cancer Father       Social History    Tobacco Use      Smoking status: Never Smoker      Smokeless tobacco: Never Used    Vaping Use      Vaping Use: Never used    Alcohol use:  Yes      Alcohol/week: 3.0 - 4 intact comprehension, repetition and naming. Normal attention and memory. Higher cortical function intact. Cranial nerves:   II, III, IV, VI :Pupils round, equal and reactive to light  and accommodation bilaterally. Extraocular muscle intact.  Visual f

## 2021-09-28 NOTE — TELEPHONE ENCOUNTER
RERE ELM patient. Routed to [facility] pool    ----- Message from Zac Doanto MD sent at 9/28/2021 10:36 AM CDT -----    I want to get genetic saliva testing on this patient.  Can we check and see if he can come to the clinic for the test. If yes, please danny

## 2021-09-28 NOTE — TELEPHONE ENCOUNTER
[employer] completes the Saliva test - testing kits available in [facility]    Will discuss with provider.

## 2021-09-29 RX ORDER — ROSUVASTATIN CALCIUM 10 MG/1
10 TABLET, COATED ORAL NIGHTLY
Qty: 90 TABLET | Refills: 1 | OUTPATIENT
Start: 2021-09-29

## 2021-09-29 NOTE — TELEPHONE ENCOUNTER
Last office visit 8/20/20 due for annual follow up. Last refill 6/24/21 for 90 tabs with 1 refill. IJJ CORPhart message to Angie Esqueda to schedule annual follow up with Dr. Norma Arce. Angie Esqueda responded and scheduled annual follow up.

## 2021-09-29 NOTE — TELEPHONE ENCOUNTER
Discussed with provider and she states she would like the [employer] saliva test completed for patient. All testing kits are out of date. RN sent email to company requesting additional testing kits.

## 2021-10-12 NOTE — TELEPHONE ENCOUNTER
Testing Kits arrived in [facility] office. Called patient and scheduled to come for Saliva test in [facility] office for 10/13/2021.

## 2021-10-13 ENCOUNTER — NURSE ONLY (OUTPATIENT)
Dept: NEUROLOGY | Facility: CLINIC | Age: 61
End: 2021-10-13
Payer: COMMERCIAL

## 2021-10-13 RX ORDER — METOPROLOL TARTRATE 50 MG/1
50 TABLET, FILM COATED ORAL 2 TIMES DAILY
Qty: 180 TABLET | Refills: 1 | Status: SHIPPED | OUTPATIENT
Start: 2021-10-13 | End: 2021-10-18

## 2021-10-13 NOTE — TELEPHONE ENCOUNTER
Patient in office and testing completed. All paperwork completed and placed with specimen. Patient aware that saliva will be tested by third party [employer]. Specimen number [id number]    FED EX tracking number [id number].     TORRES matias

## 2021-10-13 NOTE — TELEPHONE ENCOUNTER
Last office visit not reviewed. Refilled per protocol.     Hypertensive Medications  Protocol Criteria:  · Appointment scheduled with Cardiology in the past 12 months or in the next 3 months  · BMP or CMP in the past 12 months  · Potassium: 3.1 - 4.9 mmol/

## 2021-10-18 ENCOUNTER — OFFICE VISIT (OUTPATIENT)
Dept: CARDIOLOGY CLINIC | Facility: CLINIC | Age: 61
End: 2021-10-18
Payer: COMMERCIAL

## 2021-10-18 VITALS
DIASTOLIC BLOOD PRESSURE: 82 MMHG | WEIGHT: 301 LBS | HEART RATE: 68 BPM | BODY MASS INDEX: 36.65 KG/M2 | RESPIRATION RATE: 18 BRPM | SYSTOLIC BLOOD PRESSURE: 120 MMHG | HEIGHT: 76 IN

## 2021-10-18 DIAGNOSIS — E78.2 MIXED HYPERLIPIDEMIA: ICD-10-CM

## 2021-10-18 DIAGNOSIS — R93.1 AGATSTON CORONARY ARTERY CALCIUM SCORE BETWEEN 100 AND 199: ICD-10-CM

## 2021-10-18 DIAGNOSIS — I48.0 PAROXYSMAL ATRIAL FIBRILLATION (HCC): Primary | ICD-10-CM

## 2021-10-18 DIAGNOSIS — Q21.1 ASD (ATRIAL SEPTAL DEFECT): ICD-10-CM

## 2021-10-18 PROCEDURE — 3074F SYST BP LT 130 MM HG: CPT | Performed by: INTERNAL MEDICINE

## 2021-10-18 PROCEDURE — 99214 OFFICE O/P EST MOD 30 MIN: CPT | Performed by: INTERNAL MEDICINE

## 2021-10-18 PROCEDURE — 3079F DIAST BP 80-89 MM HG: CPT | Performed by: INTERNAL MEDICINE

## 2021-10-18 PROCEDURE — 3008F BODY MASS INDEX DOCD: CPT | Performed by: INTERNAL MEDICINE

## 2021-10-18 RX ORDER — ROSUVASTATIN CALCIUM 10 MG/1
10 TABLET, COATED ORAL NIGHTLY
Qty: 90 TABLET | Refills: 3 | Status: SHIPPED | OUTPATIENT
Start: 2021-10-18

## 2021-10-18 RX ORDER — METOPROLOL TARTRATE 50 MG/1
50 TABLET, FILM COATED ORAL 2 TIMES DAILY
Qty: 180 TABLET | Refills: 3 | Status: SHIPPED | OUTPATIENT
Start: 2021-10-18

## 2021-10-18 RX ORDER — PYRIDOXINE HCL (VITAMIN B6) 50 MG
TABLET ORAL
COMMUNITY

## 2021-10-18 NOTE — PATIENT INSTRUCTIONS
-Perform light-moderate aerobic exercise, 30-45 minutes daily on most days of the week.   Rowing machine is a great option.  -Limit simple carbohydrates/sugars and saturated fats, and limit overall calories to achieve and maintain ideal weight  -Blood work

## 2021-10-18 NOTE — PROGRESS NOTES
[facility] PROGRESS NOTE      Patient Name: Juancho Casarez MRN: [id number]   : 1960 CSN: [id number] tablet, Rfl: 0    No current facility-administered medications on file prior to visit. PHYSICAL EXAM:   Blood pressure 120/82, pulse 68, resp. rate 18, height 6' 4\" (1.93 m), weight (!) 301 lb (136.5 kg).   GEN - no distress  HEENT - normal conjunctiv cardioverted in 2018. He has had no recurrence since then. He continues to be asymptomatic. He is on aspirin for stroke prophylaxis. He has moderate coronary calcifications, which we are addressing with aggressive risk factor control.   His blood pres

## 2021-11-02 ENCOUNTER — TELEPHONE (OUTPATIENT)
Dept: GASTROENTEROLOGY | Facility: CLINIC | Age: 61
End: 2021-11-02

## 2021-11-02 NOTE — TELEPHONE ENCOUNTER
----- Message from Nancy Brown RN sent at 1/28/2019 11:55 AM CST -----  Regarding: 3 yr CLN recall  Entered into Epic:Recall colon in 3 years per Dr. Alfred Chiu. Last Colon done 1/25/19, next due 1/25/22. Snapshot updated.

## 2022-01-13 ENCOUNTER — TELEPHONE (OUTPATIENT)
Dept: GASTROENTEROLOGY | Facility: CLINIC | Age: 62
End: 2022-01-13

## 2022-01-13 DIAGNOSIS — Z86.010 HX OF COLONIC POLYPS: Primary | ICD-10-CM

## 2022-01-13 RX ORDER — POLYETHYLENE GLYCOL 3350, SODIUM CHLORIDE, SODIUM BICARBONATE, POTASSIUM CHLORIDE 420; 11.2; 5.72; 1.48 G/4L; G/4L; G/4L; G/4L
POWDER, FOR SOLUTION ORAL
Qty: 4000 ML | Refills: 0 | Status: SHIPPED | OUTPATIENT
Start: 2022-01-13

## 2022-01-13 NOTE — TELEPHONE ENCOUNTER
The patient's chart has been reviewed. Okay to schedule pt for 3 year CLN recall r/t hx colon polyps with Dr. Hadley Amin.      Advise MAC sedation with split dose Colyte/TriLyte or equivalent(eRx) preparation.   -Eligible for NE: No r/t medical hx  -Denies histo

## 2022-01-13 NOTE — TELEPHONE ENCOUNTER
[facility] HOSP - [facility] Endoscopy Report        Preoperative Diagnosis:  - colon screening        Postoperative Diagnosis:  - colon polyps x 6  - diverticulosis   - internal hemorrhoids        Procedure:    Colonoscopy         Surgeon:  Uri Jamil disease  - Symptomatic treatment of hemorrhoids              Elena Simpson.  April Prado MD  1/25/2019  10:44 AM               Electronically signed by Malachi Zamorano MD at 1/25/2019 10:49 AM    Aruna Wallace MD   1/27/2019  4:32 PM CST         I wanted to get adenoma.     C. Sigmoid colon polyp:   · Tubular adenoma.     D. Rectum polyp x2:   · Fragments of hyperplastic polyp with focal ulceration and inflammation.    Electronically signed by Fide Gallardo MD on 1/25/2019 at  4:43 PM      [employer]

## 2022-01-13 NOTE — TELEPHONE ENCOUNTER
Lewis Andersen    Patient called to schedule 3 year recall colonoscopy. Please provide orders if appropriate.     Thank you      Last Procedure, Date, MD:  Nicole Kim colonoscopy 1/25/2019  Last Diagnosis:  Colon polyps x6, diverticulosis, internal hemorrhoids  Rec

## 2022-01-17 NOTE — TELEPHONE ENCOUNTER
I spoke with the patient and he is now scheduled.      Scheduled for:  Colonoscopy [address]  Provider Name:  Dr Jose Garcia  Date:  Friday, 05/27/2022  Location:  [facility]  Sedation:  MAC  Time:  9:45am ( pt is aware arrival time is at 8:45am)  Prep:  Trilyte  Meds/Aller

## 2022-02-08 ENCOUNTER — HOSPITAL ENCOUNTER (OUTPATIENT)
Age: 62
Discharge: HOME | End: 2022-02-08
Payer: COMMERCIAL

## 2022-02-08 ENCOUNTER — TELEPHONE (OUTPATIENT)
Dept: NEUROLOGY | Facility: CLINIC | Age: 62
End: 2022-02-08

## 2022-02-08 VITALS
SYSTOLIC BLOOD PRESSURE: 131 MMHG | RESPIRATION RATE: 18 BRPM | TEMPERATURE: 98 F | OXYGEN SATURATION: 97 % | HEART RATE: 108 BPM | DIASTOLIC BLOOD PRESSURE: 74 MMHG

## 2022-02-08 DIAGNOSIS — I48.92 ATRIAL FIBRILLATION AND FLUTTER (HCC): Primary | ICD-10-CM

## 2022-02-08 DIAGNOSIS — I48.91 ATRIAL FIBRILLATION AND FLUTTER (HCC): Primary | ICD-10-CM

## 2022-02-08 PROCEDURE — 99203 OFFICE O/P NEW LOW 30 MIN: CPT

## 2022-02-08 PROCEDURE — 99214 OFFICE O/P EST MOD 30 MIN: CPT

## 2022-02-08 PROCEDURE — 93005 ELECTROCARDIOGRAM TRACING: CPT

## 2022-02-08 PROCEDURE — 93010 ELECTROCARDIOGRAM REPORT: CPT | Performed by: PHYSICIAN ASSISTANT

## 2022-02-08 PROCEDURE — 93010 ELECTROCARDIOGRAM REPORT: CPT

## 2022-02-08 NOTE — TELEPHONE ENCOUNTER
[facility]  [address]  [place] [address]  Phone [phone] Fax [phone]     Date: 2017     RE:   MICK CHOWDHURY  MR#: [id number]  : 1984     Dear Susan Haase, APRN CNP    I had the pleasure of seeing your patient, MICK CHOWDHURY, today in clinic along with his wife. I originally saw him last year in July to evaluate his obesity and consider him for possible weight loss surgery. He started the pre op requirements for surgery and was progressing.  In December he sold his house and his family moved in with this sister while his new home was being built. During this transition he had little influence over the food around him and his diabetes was uncontrolled.  He recognized the need to be in his own home for to succeed and hence paused his process to surgery.     He is here today to restart.  His family is now living in their new home.   He is energized now.  Feels he is in a good place.  He is far more self aware and is ready to hit the ground running.      His HgA1C on 18 9.0. He recently started on a continuous glucose monitor which is helping with feedback.His sugars are generally ranging in 115-150's.         Comorbidities of obesity from his past medical history include: High blood pressure, High cholesterol, Diabetes type II, Plantar fasciitis, Sciatica, Low back pain, Intertrigo.      The symptoms related to his obesity include elevated blood sugars, Hip pain, Knee pain, Back pain, intertrigo. The following ADLs are hindered due to his weight: Transfer on/off toilet, Transfer in/out of shower/tub, Not enough energy to complete routine daily tasks.     Non-Obesity Related Past Medical History:  Anxiety  Depression  Binge Eating Disorder diagnosed 2016-Did 6 months of treatment. Works with  Hannah Antonio PsyD at [employer].  Letter of support with ED clearance reviewed-10/23/17.  Acne  Subluxing patella     Past Surgical History:No previous  Genetic test results received and faxed to [place] office with fax confirmation received and copy also kept in [place] office in Dr Mare Boas folder. "bleeding, anesthesia, or nausea concerns with surgery.  [place] teeth   Knee Arthroscopy     Family History:  Father- Diabetes;High cholesterol, CVD.  Mother- Obese  PGF- Prostate Cancer  PGM- Diabetes  MGM- Breast Cancer  Sibling 1- gall bladder problems;High cholesterol;Obese     Social History:  He is  to Brit.  She had gastric bypass surgery about 2 years ago.    Brody is going to be 5 in September.  He is in .   ETOH: Weekly, 4 drinks at a time on weekends.   Illicit Drug Use: None  Tobacco Use: No  Support system: spouse, mom, sister will be available to help the patient in the early post op period. spouse, mom, sister, raine, grace, bertram, han is who the patient can count on for support throughout his weight loss journey.  Can you afford three meals per day? Yes  Can you afford the $50-60 per month for vitamins? Yes     A 14 point review of system shows:  Gastrointestinal: Elevated blood sugars, Diarrhea- secondary to metformin  HEENT: Headaches ,  Musculoskeletal:  Hip pain, Knee pain, Back pain,  Psychological: Anxiety, Depression,  Skin: intertrigo, where belt buckle is.    Endocrinology: Diabetes Type II managed with Dr. Turpin. His HgA1C on 4/12/18 9.0. He recently started on a continuous glucose monitor which is helping with feedback. His sugars are generally ranging in 115-150's     /83 (BP Location: Left arm, Patient Position: Chair, Cuff Size: Adult Large)  Pulse 90  Temp 98  F (36.7  C) (Oral)  Resp 18  Ht 6' 1\" (1.854 m)  Wt (!) 369 lb (167.4 kg)  SpO2 96%  BMI 48.68 kg/m2       PHYSICAL EXAMINATION:     GENERAL: obese, good health, good development, and normal affect., Alert and oriented x3. NAD  HEART: No JVD  LUNGS: Breathing unlabored.  MUSC: Gait normal  NEURO: Alert and oriented x3.      In summary, MICK is morbidly obese with a body mass index of 46.1 kg/m2 and the comorbidities stated above. He attended an informational seminar and is a " candidate for Gastric Bypass (Snehal-en-Y). He will have to complete the following pre-requisites:     Lose 5 lbs prior to surgery.  Goal Weight: 364 lbs   Have preoperative laboratory tests drawn.  (Vit D only)   Psychological Evaluation Update with Hannah Antonio PsyD   Hemoglobin A1C under 8 before surgery   Achieve clearance from dietitian to see surgeon.     Today in the office we discussed gastric bypass surgery. Preoperative, perioperative, and postoperative processes, management, and follow up were addressed. All questions were answered. A goal sheet and support group handout were given to the patient.     Once the patient has completed the requirements set forth in paragraph one, and there are no further recommendations, he will be allowed to see the surgeon of his choice for consultation on the Gastric Bypass (Snehal-en-Y) surgery. Patient verbalizes understanding of the process to surgery and expectations for the postoperative period including the need for lifelong lifestyle changes, vitamin supplementation, and laboratory monitoring.     Thank you for allowing us to participate in his care.     Sincerely,           Lilibeth Ying PA-C    This was a 40 minute visit with greater than 50% spent on counseling.      At [facility] we are committed to providing you exceptional care. Our surgeons specialize in performing the following minimally invasive weight-loss surgeries:     Snehal-en-Y gastric bypass  Laparoscopic adjustable gastric band  Sleeve gastrectomy     If you would like to review aspects of our weight loss surgery program, please visit our website at [email or web address]. If you have any questions, please do not hesitate to contact us at [phone].

## 2022-02-08 NOTE — ED INITIAL ASSESSMENT (HPI)
Patient is here with a feeling of \"lightheadness\" since last night. He states he feels it is his atrial fibrillation kicking in.

## 2022-02-09 NOTE — TELEPHONE ENCOUNTER
Fax received from [facility] with genetic testing results. Results placed on Dr Héctor zamora for review & advisement.

## 2022-02-11 NOTE — H&P
[facility]    Cardiac Electrophysiology H&P Addendum    [facility]ie [facility]   CSN [id number] MRN [id number]   Admission Date 2/16/2022 Procedure Date 2/16/2022   Attending Physician Kerry Banks MD Procedure Physician Tere Martinez MD       H&P ADDENDUM    I personally reviewed the attached H&P on 2/16/2022, and no significant changes have occurred in the patient's condition since the H&P was performed. Risks, alternatives, and benefits of procedure were again reviewed at bedside with the patient. They have no additional questions and wish to proceed. Tere Martinez M.D.    Cardiac Electrophysiology       -----------------------------------------

## 2022-02-12 NOTE — TELEPHONE ENCOUNTER
Provider reviewed testing and noted that test result was negative form common hereditary neuropathies. Routed to [facility] patient to call results to patient. Copy sent to scanning.

## 2022-02-14 ENCOUNTER — LAB ENCOUNTER (OUTPATIENT)
Dept: LAB | Facility: HOSPITAL | Age: 62
End: 2022-02-14
Attending: INTERNAL MEDICINE
Payer: COMMERCIAL

## 2022-02-14 DIAGNOSIS — Z01.818 PRE-OP TESTING: ICD-10-CM

## 2022-02-14 LAB
ALBUMIN SERPL-MCNC: 4.2 G/DL (ref 3.4–5)
ALBUMIN/GLOB SERPL: 1.4 {RATIO} (ref 1–2)
ALP LIVER SERPL-CCNC: 71 U/L
ALT SERPL-CCNC: 66 U/L
ANION GAP SERPL CALC-SCNC: 7 MMOL/L (ref 0–18)
AST SERPL-CCNC: 21 U/L (ref 15–37)
BASOPHILS # BLD AUTO: 0.07 X10(3) UL (ref 0–0.2)
BASOPHILS NFR BLD AUTO: 1.2 %
BILIRUB SERPL-MCNC: 0.6 MG/DL (ref 0.1–2)
BUN BLD-MCNC: 14 MG/DL (ref 7–18)
BUN/CREAT SERPL: 14.7 (ref 10–20)
CALCIUM BLD-MCNC: 9.9 MG/DL (ref 8.5–10.1)
CHLORIDE SERPL-SCNC: 109 MMOL/L (ref 98–112)
CO2 SERPL-SCNC: 27 MMOL/L (ref 21–32)
DEPRECATED RDW RBC AUTO: 44.6 FL (ref 35.1–46.3)
EOSINOPHIL # BLD AUTO: 0.19 X10(3) UL (ref 0–0.7)
EOSINOPHIL NFR BLD AUTO: 3.1 %
ERYTHROCYTE [DISTWIDTH] IN BLOOD BY AUTOMATED COUNT: 12.9 % (ref 11–15)
FASTING STATUS PATIENT QL REPORTED: YES
GLOBULIN PLAS-MCNC: 2.9 G/DL (ref 2.8–4.4)
GLUCOSE BLD-MCNC: 113 MG/DL (ref 70–99)
HCT VFR BLD AUTO: 51.1 %
HGB BLD-MCNC: 16.3 G/DL
IMM GRANULOCYTES # BLD AUTO: 0.01 X10(3) UL (ref 0–1)
IMM GRANULOCYTES NFR BLD: 0.2 %
LYMPHOCYTES # BLD AUTO: 1.18 X10(3) UL (ref 1–4)
LYMPHOCYTES NFR BLD AUTO: 19.4 %
MCH RBC QN AUTO: 29.7 PG (ref 26–34)
MCHC RBC AUTO-ENTMCNC: 31.9 G/DL (ref 31–37)
MCV RBC AUTO: 93.1 FL
MONOCYTES # BLD AUTO: 0.59 X10(3) UL (ref 0.1–1)
MONOCYTES NFR BLD AUTO: 9.7 %
NEUTROPHILS # BLD AUTO: 4.03 X10 (3) UL (ref 1.5–7.7)
NEUTROPHILS # BLD AUTO: 4.03 X10(3) UL (ref 1.5–7.7)
NEUTROPHILS NFR BLD AUTO: 66.4 %
OSMOLALITY SERPL CALC.SUM OF ELEC: 297 MOSM/KG (ref 275–295)
PLATELET # BLD AUTO: 226 10(3)UL (ref 150–450)
POTASSIUM SERPL-SCNC: 5.5 MMOL/L (ref 3.5–5.1)
PROT SERPL-MCNC: 7.1 G/DL (ref 6.4–8.2)
RBC # BLD AUTO: 5.49 X10(6)UL
SARS-COV-2 RNA RESP QL NAA+PROBE: NOT DETECTED
SODIUM SERPL-SCNC: 143 MMOL/L (ref 136–145)
WBC # BLD AUTO: 6.1 X10(3) UL (ref 4–11)

## 2022-02-14 PROCEDURE — 85025 COMPLETE CBC W/AUTO DIFF WBC: CPT

## 2022-02-14 PROCEDURE — 36415 COLL VENOUS BLD VENIPUNCTURE: CPT

## 2022-02-14 PROCEDURE — 80053 COMPREHEN METABOLIC PANEL: CPT

## 2022-02-16 ENCOUNTER — ANESTHESIA (OUTPATIENT)
Dept: INTERVENTIONAL RADIOLOGY/VASCULAR | Facility: HOSPITAL | Age: 62
End: 2022-02-16
Payer: COMMERCIAL

## 2022-02-16 ENCOUNTER — HOSPITAL ENCOUNTER (OUTPATIENT)
Dept: INTERVENTIONAL RADIOLOGY/VASCULAR | Facility: HOSPITAL | Age: 62
Discharge: HOME | End: 2022-02-16
Attending: INTERNAL MEDICINE | Admitting: INTERNAL MEDICINE
Payer: COMMERCIAL

## 2022-02-16 VITALS
TEMPERATURE: 98 F | BODY MASS INDEX: 33.49 KG/M2 | HEART RATE: 90 BPM | WEIGHT: 275 LBS | DIASTOLIC BLOOD PRESSURE: 85 MMHG | HEIGHT: 76 IN | RESPIRATION RATE: 17 BRPM | SYSTOLIC BLOOD PRESSURE: 120 MMHG | OXYGEN SATURATION: 92 %

## 2022-02-16 DIAGNOSIS — I48.91 ATRIAL FIBRILLATION (HCC): ICD-10-CM

## 2022-02-16 DIAGNOSIS — Z01.818 PRE-OP TESTING: Primary | ICD-10-CM

## 2022-02-16 LAB — POTASSIUM SERPL-SCNC: 4.3 MMOL/L (ref 3.5–5.1)

## 2022-02-16 PROCEDURE — 5A2204Z RESTORATION OF CARDIAC RHYTHM, SINGLE: ICD-10-PCS | Performed by: INTERNAL MEDICINE

## 2022-02-16 PROCEDURE — 93010 ELECTROCARDIOGRAM REPORT: CPT | Performed by: INTERNAL MEDICINE

## 2022-02-16 PROCEDURE — 84132 ASSAY OF SERUM POTASSIUM: CPT | Performed by: INTERNAL MEDICINE

## 2022-02-16 PROCEDURE — 93005 ELECTROCARDIOGRAM TRACING: CPT

## 2022-02-16 PROCEDURE — 36415 COLL VENOUS BLD VENIPUNCTURE: CPT

## 2022-02-16 PROCEDURE — 92960 CARDIOVERSION ELECTRIC EXT: CPT

## 2022-02-16 RX ORDER — APIXABAN 5 MG/1
5 TABLET, FILM COATED ORAL 2 TIMES DAILY
COMMUNITY
Start: 2022-02-11

## 2022-02-16 RX ORDER — LIDOCAINE HYDROCHLORIDE 10 MG/ML
INJECTION, SOLUTION EPIDURAL; INFILTRATION; INTRACAUDAL; PERINEURAL AS NEEDED
Status: DISCONTINUED | OUTPATIENT
Start: 2022-02-16 | End: 2022-02-16 | Stop reason: SURG

## 2022-02-16 RX ORDER — SODIUM CHLORIDE 9 MG/ML
INJECTION, SOLUTION INTRAVENOUS CONTINUOUS
Status: DISCONTINUED | OUTPATIENT
Start: 2022-02-16 | End: 2022-02-16

## 2022-02-16 RX ADMIN — LIDOCAINE HYDROCHLORIDE 50 MG: 10 INJECTION, SOLUTION EPIDURAL; INFILTRATION; INTRACAUDAL; PERINEURAL at 14:16:00

## 2022-02-16 RX ADMIN — SODIUM CHLORIDE: 9 INJECTION, SOLUTION INTRAVENOUS at 13:00:00

## 2022-02-16 NOTE — ANESTHESIA POSTPROCEDURE EVALUATION
Patient: Rhae Sieving    Procedure Summary     Date: 02/16/22 Room / Location: [facility]    Anesthesia Start: 0977 Anesthesia Stop: 6418    Procedure: EP CARDIOVERSION 1X Diagnosis:       Atrial fibrillation ([address]      Atrial fibrillation ([address]    Scheduled Providers: Alison Trammell MD; Caryn Miranda DO Anesthesiologist: Caryn Miranda DO    Anesthesia Type: general ASA Status: 3          Anesthesia Type: No value filed. PACU Vitals    /74 (02/16/22 1425)    Temp      Pulse 74 (02/16/22 1425)   Resp 16 (02/16/22 1425)    SpO2 100 % (02/16/22 1425)        [facility] AN Post Evaluation:   Patient Evaluated in Patient location: cath lab area.   Patient Participation: complete - patient participated  Level of Consciousness: awake  Pain Management: adequate  Airway Patency:patent  Dental exam unchanged from preop  Yes    Cardiovascular Status: acceptable  Respiratory Status: acceptable  Postoperative Hydration acceptable      BERNICE BALL DO  2/16/2022 2:27 PM

## 2022-02-16 NOTE — IVS NOTE
DISCHARGE NOTE     Pt is able to sit up and ambulate without difficulty. Pt voided and tolerated fluids and food. No signs and symptoms of bleeding/hematoma noted. Pt denies any pain or discomfort at this time. IV access removed  Instruction provided, patient/family verbalizes understanding. Dr. Kimberly Thacker spoke with patient/family post procedure.    EKG completed prior to discharge    Pt discharge via wheelchair to [address] - patient's wife, Agustin Serna at bedside and will be driving patient home    Follow up Appointment: follow-up with Dr. Kimberly Thacker in 1 month    New Prescription: n/a; patient out of crestor - per Dr. Kimberly Thacker he will call office to send over a new prescription

## 2022-02-16 NOTE — PROCEDURES
[facility]    Cardiac Electrophysiology Procedure Note    [facility]   [facility] [id number] MRN [id number]   Admission Date 2/16/2022 Procedure Date 2/16/2022   Attending Physician Mark Stroud MD Procedure Physician Steve Cunha MD       Pre-Operative Diagnosis: Persistent atrial fibrillation    Post-Operative Diagnosis: Persistent atrial fibrillation    Procedure Performed:    1. Direct current cardioversion    Anesthesia: Per attending anesthesiologist    Complications: None apparent    Procedural findings: The patient was brought to the procedure suite in stable and postabsorptive state after providing informed consent. A time out was performed to confirm the patient's identity and type and site of the procedure. Anesthesia was administered by the attending anesthesiologist, please see separate record. The patient received a 200 Joule synchronized biphasic shock via anterior-posterior pads, which converted the patient from atrial fibrillation 130 bpm to sinus rhythm 75 bpm.  The patient was recovered by the anesthesiologist, and transferred to the recovery area in stable and comfortable condition. RESULTS:  -- Successful electrical cardioversion to sinus rhythm    PLAN:  1. Recovery by anesthesiologist  2. ECG  3. Continue anticoagulation with Eliquis  4. Medication changes: None  5. Discharge after anesthesia recovery criteria met  6. Activity and driving restrictions x 24 hours  7. Follow-up with Dr Kimberly Thacker as scheduled    Steve Cunha M.D.    Cardiac Electrophysiology     2/16/2022   -----------------------------------------

## 2022-05-27 ENCOUNTER — HOSPITAL ENCOUNTER (OUTPATIENT)
Facility: HOSPITAL | Age: 62
Setting detail: HOSPITAL OUTPATIENT SURGERY
Discharge: HOME | End: 2022-05-27
Attending: INTERNAL MEDICINE | Admitting: INTERNAL MEDICINE
Payer: COMMERCIAL

## 2022-05-27 ENCOUNTER — ANESTHESIA (OUTPATIENT)
Dept: ENDOSCOPY | Facility: HOSPITAL | Age: 62
End: 2022-05-27
Payer: COMMERCIAL

## 2022-05-27 ENCOUNTER — ANESTHESIA EVENT (OUTPATIENT)
Dept: ENDOSCOPY | Facility: HOSPITAL | Age: 62
End: 2022-05-27
Payer: COMMERCIAL

## 2022-05-27 VITALS
SYSTOLIC BLOOD PRESSURE: 109 MMHG | OXYGEN SATURATION: 94 % | TEMPERATURE: 98 F | WEIGHT: 270 LBS | HEART RATE: 68 BPM | RESPIRATION RATE: 17 BRPM | HEIGHT: 77 IN | BODY MASS INDEX: 31.88 KG/M2 | DIASTOLIC BLOOD PRESSURE: 66 MMHG

## 2022-05-27 DIAGNOSIS — Z86.010 HX OF COLONIC POLYPS: ICD-10-CM

## 2022-05-27 PROCEDURE — 45385 COLONOSCOPY W/LESION REMOVAL: CPT | Performed by: INTERNAL MEDICINE

## 2022-05-27 PROCEDURE — 0DBN8ZX EXCISION OF SIGMOID COLON, VIA NATURAL OR ARTIFICIAL OPENING ENDOSCOPIC, DIAGNOSTIC: ICD-10-PCS | Performed by: INTERNAL MEDICINE

## 2022-05-27 PROCEDURE — 0DBK8ZX EXCISION OF ASCENDING COLON, VIA NATURAL OR ARTIFICIAL OPENING ENDOSCOPIC, DIAGNOSTIC: ICD-10-PCS | Performed by: INTERNAL MEDICINE

## 2022-05-27 RX ORDER — SODIUM CHLORIDE, SODIUM LACTATE, POTASSIUM CHLORIDE, CALCIUM CHLORIDE 600; 310; 30; 20 MG/100ML; MG/100ML; MG/100ML; MG/100ML
INJECTION, SOLUTION INTRAVENOUS CONTINUOUS
Status: DISCONTINUED | OUTPATIENT
Start: 2022-05-27 | End: 2022-05-27

## 2022-05-27 RX ORDER — NALOXONE HYDROCHLORIDE 0.4 MG/ML
80 INJECTION, SOLUTION INTRAMUSCULAR; INTRAVENOUS; SUBCUTANEOUS AS NEEDED
Status: DISCONTINUED | OUTPATIENT
Start: 2022-05-27 | End: 2022-05-27

## 2022-05-27 RX ORDER — LIDOCAINE HYDROCHLORIDE 10 MG/ML
INJECTION, SOLUTION EPIDURAL; INFILTRATION; INTRACAUDAL; PERINEURAL AS NEEDED
Status: DISCONTINUED | OUTPATIENT
Start: 2022-05-27 | End: 2022-05-27 | Stop reason: SURG

## 2022-05-27 RX ADMIN — LIDOCAINE HYDROCHLORIDE 50 MG: 10 INJECTION, SOLUTION EPIDURAL; INFILTRATION; INTRACAUDAL; PERINEURAL at 10:08:00

## 2022-05-27 NOTE — ANESTHESIA POSTPROCEDURE EVALUATION
Patient: Hung Ag    Procedure Summary     Date: 05/27/22 Room / Location: [address] [place] [address] ENDOSCOPY 01 / [address] [place] [address] ENDOSCOPY    Anesthesia Start: 6462 Anesthesia Stop: 1757    Procedure: COLONOSCOPY (N/A ) Diagnosis:       Hx of colonic polyps      (polyps, hemorrhoids)    Surgeons: Abhinav Rosario MD Anesthesiologist: Cinda Bose CRNA    Anesthesia Type: MAC ASA Status: 2          Anesthesia Type: MAC    Vitals Value Taken Time   BP 93/55 05/27/22 1038   Temp 98 05/27/22 1038   Pulse 70 05/27/22 1038   Resp 16 05/27/22 1038   SpO2 95 05/27/22 1038       [address] [place] [address] AN Post Evaluation:   Patient Evaluated in PACU  Patient Participation: complete - patient participated  Level of Consciousness: awake and alert  Pain Management: adequate  Airway Patency:patent  Dental exam unchanged from preop  Yes    Cardiovascular Status: acceptable  Respiratory Status: acceptable  Postoperative Hydration acceptable      Gareth Duncan CRNA  5/27/2022 10:38 AM

## 2022-05-27 NOTE — OPERATIVE REPORT
[facility] Endoscopy Report      Preoperative Diagnosis:  - history of colon polyps       Postoperative Diagnosis:  - colon polyps x 2  - diverticulosis  - internal hemorrhoids      Procedure:    Colonoscopy       Surgeon:  Xiang Balderas M.D. Anesthesia:  MAC sedation    Technique:  After informed consent, the patient was placed in the left lateral recumbent position. Digital rectal examination revealed no palpable intraluminal abnormalities. An Olympus variable stiffness 190 series HD colonoscope was inserted into the rectum and advanced under direct vision by following the lumen to the cecum. The colon was examined upon withdrawal in the left lateral position. The procedure was well tolerated without immediate complication. Findings:  The preparation of the colon was good. The terminal ileum was examined for 4 cm and visually normal.  The ileocecal valve was well preserved. The visualized colonic mucosa from the cecum to the anal verge was normal with an intact vascular pattern. Colon polyps x2 removed as follows;  -Ascending x1, sessile 3 to 4 mm in size and cold snare removed. -Sigmoid x1, semipedunculated 1.2 cm and removed by hot snare technique with placement of 2 clips across the mucosal defect. Specimens retrieved. Both polypectomy sites inspected and found to be free of bleeding and both specimens retrieved and sent for analysis. Diverticulosis located in the sigmoid colon, known to diverticulitis. Small internal hemorrhoids noted on retroflexed view. Estimated blood loss-insignificant  Specimens-colon polyps      Impression:  - colon polyps x 2  - diverticulosis  - internal hemorrhoids    Recommendations:  - Post polypectomy instructions given  - Repeat colonoscopy in 3 years   - High fiber diet for diverticular disease  - Symptomatic treatment of hemorrhoids          Shade Miller.  Brendan Smith MD  5/27/2022  10:40 AM

## 2022-06-01 ENCOUNTER — TELEPHONE (OUTPATIENT)
Dept: GASTROENTEROLOGY | Facility: CLINIC | Age: 62
End: 2022-06-01

## 2022-09-09 ENCOUNTER — LAB ENCOUNTER (OUTPATIENT)
Dept: LAB | Age: 62
End: 2022-09-09
Attending: INTERNAL MEDICINE
Payer: COMMERCIAL

## 2022-09-09 DIAGNOSIS — I48.0 PAROXYSMAL ATRIAL FIBRILLATION (HCC): ICD-10-CM

## 2022-09-09 DIAGNOSIS — E78.2 MIXED HYPERLIPIDEMIA: ICD-10-CM

## 2022-09-09 LAB
ALBUMIN SERPL-MCNC: 4.1 G/DL (ref 3.4–5)
ALBUMIN/GLOB SERPL: 1.2 {RATIO} (ref 1–2)
ALP LIVER SERPL-CCNC: 62 U/L
ALT SERPL-CCNC: 64 U/L
ANION GAP SERPL CALC-SCNC: 6 MMOL/L (ref 0–18)
AST SERPL-CCNC: 32 U/L (ref 15–37)
BILIRUB SERPL-MCNC: 0.9 MG/DL (ref 0.1–2)
BUN BLD-MCNC: 15 MG/DL (ref 7–18)
BUN/CREAT SERPL: 15 (ref 10–20)
CALCIUM BLD-MCNC: 9.2 MG/DL (ref 8.5–10.1)
CHLORIDE SERPL-SCNC: 106 MMOL/L (ref 98–112)
CHOLEST SERPL-MCNC: 145 MG/DL (ref ?–200)
CO2 SERPL-SCNC: 27 MMOL/L (ref 21–32)
CREAT BLD-MCNC: 1 MG/DL
DEPRECATED RDW RBC AUTO: 43.8 FL (ref 35.1–46.3)
ERYTHROCYTE [DISTWIDTH] IN BLOOD BY AUTOMATED COUNT: 12.9 % (ref 11–15)
FASTING PATIENT LIPID ANSWER: YES
FASTING STATUS PATIENT QL REPORTED: YES
GFR SERPLBLD BASED ON 1.73 SQ M-ARVRAT: 85 ML/MIN/1.73M2 (ref 60–?)
GLOBULIN PLAS-MCNC: 3.3 G/DL (ref 2.8–4.4)
GLUCOSE BLD-MCNC: 110 MG/DL (ref 70–99)
HCT VFR BLD AUTO: 49.6 %
HDLC SERPL-MCNC: 38 MG/DL (ref 40–59)
HGB BLD-MCNC: 16.4 G/DL
LDLC SERPL CALC-MCNC: 78 MG/DL (ref ?–100)
MCH RBC QN AUTO: 30.4 PG (ref 26–34)
MCHC RBC AUTO-ENTMCNC: 33.1 G/DL (ref 31–37)
MCV RBC AUTO: 91.9 FL
NONHDLC SERPL-MCNC: 107 MG/DL (ref ?–130)
OSMOLALITY SERPL CALC.SUM OF ELEC: 289 MOSM/KG (ref 275–295)
PLATELET # BLD AUTO: 213 10(3)UL (ref 150–450)
POTASSIUM SERPL-SCNC: 4.5 MMOL/L (ref 3.5–5.1)
PROT SERPL-MCNC: 7.4 G/DL (ref 6.4–8.2)
RBC # BLD AUTO: 5.4 X10(6)UL
SODIUM SERPL-SCNC: 139 MMOL/L (ref 136–145)
TRIGL SERPL-MCNC: 166 MG/DL (ref 30–149)
VLDLC SERPL CALC-MCNC: 26 MG/DL (ref 0–30)
WBC # BLD AUTO: 5.9 X10(3) UL (ref 4–11)

## 2022-09-09 PROCEDURE — 80061 LIPID PANEL: CPT

## 2022-09-09 PROCEDURE — 85027 COMPLETE CBC AUTOMATED: CPT

## 2022-09-09 PROCEDURE — 36415 COLL VENOUS BLD VENIPUNCTURE: CPT

## 2022-09-09 PROCEDURE — 80053 COMPREHEN METABOLIC PANEL: CPT

## 2022-11-04 NOTE — H&P
[facility]    Cardiac Electrophysiology H&P Addendum    Jennifer Barros   [facility] [id number] MRN [id number]   Admission Date 11/16/2022 Procedure Date 11/16/2022   Attending Physician Eunice Mckeon MD Procedure Physician Bessie Sanchez MD       H&P ADDENDUM    I personally reviewed the attached H&P on 11/16/2022, and no significant changes have occurred in the patient's condition since the H&P was performed. Risks, alternatives, and benefits of procedure were again reviewed at bedside with the patient. They have no additional questions and wish to proceed. Bessie Sanchez M.D.    Cardiac Electrophysiology       -----------------------------------------

## 2022-11-14 ENCOUNTER — NURSE ONLY (OUTPATIENT)
Dept: LAB | Age: 62
End: 2022-11-14
Attending: INTERNAL MEDICINE
Payer: COMMERCIAL

## 2022-11-14 ENCOUNTER — LAB ENCOUNTER (OUTPATIENT)
Dept: LAB | Age: 62
End: 2022-11-14
Attending: INTERNAL MEDICINE
Payer: COMMERCIAL

## 2022-11-14 DIAGNOSIS — Z01.818 PRE-OP TESTING: ICD-10-CM

## 2022-11-14 LAB
ALBUMIN SERPL-MCNC: 4.2 G/DL (ref 3.4–5)
ALBUMIN/GLOB SERPL: 1.6 {RATIO} (ref 1–2)
ALP LIVER SERPL-CCNC: 62 U/L
ALT SERPL-CCNC: 43 U/L
ANION GAP SERPL CALC-SCNC: 9 MMOL/L (ref 0–18)
AST SERPL-CCNC: 19 U/L (ref 15–37)
BASOPHILS # BLD AUTO: 0.07 X10(3) UL (ref 0–0.2)
BASOPHILS NFR BLD AUTO: 1.2 %
BILIRUB SERPL-MCNC: 0.7 MG/DL (ref 0.1–2)
BUN BLD-MCNC: 16 MG/DL (ref 7–18)
BUN/CREAT SERPL: 15.7 (ref 10–20)
CALCIUM BLD-MCNC: 9.8 MG/DL (ref 8.5–10.1)
CHLORIDE SERPL-SCNC: 109 MMOL/L (ref 98–112)
CO2 SERPL-SCNC: 28 MMOL/L (ref 21–32)
CREAT BLD-MCNC: 1.02 MG/DL
DEPRECATED RDW RBC AUTO: 42.9 FL (ref 35.1–46.3)
EOSINOPHIL # BLD AUTO: 0.21 X10(3) UL (ref 0–0.7)
EOSINOPHIL NFR BLD AUTO: 3.7 %
ERYTHROCYTE [DISTWIDTH] IN BLOOD BY AUTOMATED COUNT: 12.6 % (ref 11–15)
FASTING STATUS PATIENT QL REPORTED: YES
GFR SERPLBLD BASED ON 1.73 SQ M-ARVRAT: 83 ML/MIN/1.73M2 (ref 60–?)
GLOBULIN PLAS-MCNC: 2.7 G/DL (ref 2.8–4.4)
GLUCOSE BLD-MCNC: 116 MG/DL (ref 70–99)
HCT VFR BLD AUTO: 49.3 %
HGB BLD-MCNC: 16.1 G/DL
IMM GRANULOCYTES # BLD AUTO: 0.01 X10(3) UL (ref 0–1)
IMM GRANULOCYTES NFR BLD: 0.2 %
LYMPHOCYTES # BLD AUTO: 1.12 X10(3) UL (ref 1–4)
LYMPHOCYTES NFR BLD AUTO: 19.6 %
MCH RBC QN AUTO: 30.1 PG (ref 26–34)
MCHC RBC AUTO-ENTMCNC: 32.7 G/DL (ref 31–37)
MCV RBC AUTO: 92.1 FL
MONOCYTES # BLD AUTO: 0.37 X10(3) UL (ref 0.1–1)
MONOCYTES NFR BLD AUTO: 6.5 %
NEUTROPHILS # BLD AUTO: 3.94 X10 (3) UL (ref 1.5–7.7)
NEUTROPHILS # BLD AUTO: 3.94 X10(3) UL (ref 1.5–7.7)
NEUTROPHILS NFR BLD AUTO: 68.8 %
OSMOLALITY SERPL CALC.SUM OF ELEC: 304 MOSM/KG (ref 275–295)
PLATELET # BLD AUTO: 216 10(3)UL (ref 150–450)
POTASSIUM SERPL-SCNC: 5.3 MMOL/L (ref 3.5–5.1)
PROT SERPL-MCNC: 6.9 G/DL (ref 6.4–8.2)
RBC # BLD AUTO: 5.35 X10(6)UL
SODIUM SERPL-SCNC: 146 MMOL/L (ref 136–145)
WBC # BLD AUTO: 5.7 X10(3) UL (ref 4–11)

## 2022-11-14 PROCEDURE — 36415 COLL VENOUS BLD VENIPUNCTURE: CPT

## 2022-11-14 PROCEDURE — 85025 COMPLETE CBC W/AUTO DIFF WBC: CPT

## 2022-11-14 PROCEDURE — 80053 COMPREHEN METABOLIC PANEL: CPT

## 2022-11-15 LAB — SARS-COV-2 RNA RESP QL NAA+PROBE: NOT DETECTED

## 2022-11-16 ENCOUNTER — HOSPITAL ENCOUNTER (OUTPATIENT)
Dept: INTERVENTIONAL RADIOLOGY/VASCULAR | Facility: HOSPITAL | Age: 62
Discharge: HOME | End: 2022-11-16
Attending: INTERNAL MEDICINE | Admitting: INTERNAL MEDICINE
Payer: COMMERCIAL

## 2022-11-16 VITALS
BODY MASS INDEX: 34.95 KG/M2 | WEIGHT: 287 LBS | RESPIRATION RATE: 15 BRPM | SYSTOLIC BLOOD PRESSURE: 114 MMHG | OXYGEN SATURATION: 98 % | TEMPERATURE: 97 F | DIASTOLIC BLOOD PRESSURE: 78 MMHG | HEART RATE: 64 BPM | HEIGHT: 76 IN

## 2022-11-16 DIAGNOSIS — Z01.818 PRE-OP TESTING: Primary | ICD-10-CM

## 2022-11-16 DIAGNOSIS — I48.91 ATRIAL FIBRILLATION (HCC): ICD-10-CM

## 2022-11-16 LAB
ATRIAL RATE: 65 BPM
P AXIS: 51 DEGREES
P-R INTERVAL: 162 MS
Q-T INTERVAL: 430 MS
QRS DURATION: 106 MS
QTC CALCULATION (BEZET): 447 MS
R AXIS: 49 DEGREES
T AXIS: 21 DEGREES
VENTRICULAR RATE: 65 BPM

## 2022-11-16 PROCEDURE — 92960 CARDIOVERSION ELECTRIC EXT: CPT

## 2022-11-16 PROCEDURE — 5A2204Z RESTORATION OF CARDIAC RHYTHM, SINGLE: ICD-10-PCS | Performed by: INTERNAL MEDICINE

## 2022-11-16 PROCEDURE — 99152 MOD SED SAME PHYS/QHP 5/>YRS: CPT

## 2022-11-16 PROCEDURE — 93010 ELECTROCARDIOGRAM REPORT: CPT | Performed by: INTERNAL MEDICINE

## 2022-11-16 PROCEDURE — 93005 ELECTROCARDIOGRAM TRACING: CPT

## 2022-11-16 RX ORDER — SODIUM CHLORIDE 9 MG/ML
INJECTION, SOLUTION INTRAVENOUS CONTINUOUS
Status: DISCONTINUED | OUTPATIENT
Start: 2022-11-16 | End: 2022-11-16

## 2022-11-16 NOTE — PROCEDURES
[facility]    Cardiac Electrophysiology Procedure Note    [facility]   [facility] [id number] MRN [id number]   Admission Date 11/16/2022 Procedure Date 11/16/2022   Attending Physician Abbe Nguyen MD Procedure Physician Umberto Perdomo MD       Pre-Operative Diagnosis: Persistent atrial fibrillation    Post-Operative Diagnosis: Persistent atrial fibrillation    Procedure Performed:    1. Direct current cardioversion    Anesthesia: I provided moderate conscious sedation from 12:40 to 13:00. Methohexital.    Complications: None apparent    Procedural findings: The patient was brought to the procedure suite in stable and postabsorptive state after providing informed consent. A time out was performed to confirm the patient's identity and type and site of the procedure. Sedation was administered. The patient received a 200 Joule, 360 Joule, then another 360 Joule synchronized biphasic shock via anterior-posterior pads, which converted the patient from atrial fibrillation to sinus rhythm 80 bpm.  The patient was recovered and remained in stable and comfortable condition. RESULTS:  -- Successful electrical cardioversion to sinus rhythm    PLAN:  1. Sedation recovery  2. ECG  3. Continue anticoagulation  4. Medication changes: None  5. Discharge after sedation recovery criteria met  6. Activity and driving restrictions x 24 hours  7. Follow-up with Dr Betty Hillman as scheduled    Umberto Perdomo M.D.    Cardiac Electrophysiology     11/16/2022   -----------------------------------------

## 2022-11-16 NOTE — IVS NOTE
Patient had a successful cardioversion. 12 lead EKG confirmed. Vital signs are stable. No complications. Patient and wife verbalized understanding of discharge instructions. IV removed. Band-aid applied. Patient transported to [facility] via wheelchair. Wife is driving patient home.

## 2023-03-27 ENCOUNTER — HOSPITAL ENCOUNTER (OUTPATIENT)
Dept: GENERAL RADIOLOGY | Facility: HOSPITAL | Age: 63
Discharge: HOME | End: 2023-03-27
Attending: ORTHOPAEDIC SURGERY
Payer: COMMERCIAL

## 2023-03-27 ENCOUNTER — OFFICE VISIT (OUTPATIENT)
Dept: ORTHOPEDICS CLINIC | Facility: CLINIC | Age: 63
End: 2023-03-27

## 2023-03-27 VITALS — SYSTOLIC BLOOD PRESSURE: 125 MMHG | HEART RATE: 69 BPM | DIASTOLIC BLOOD PRESSURE: 80 MMHG

## 2023-03-27 DIAGNOSIS — M25.569 KNEE PAIN, UNSPECIFIED CHRONICITY, UNSPECIFIED LATERALITY: ICD-10-CM

## 2023-03-27 DIAGNOSIS — M25.569 KNEE PAIN, UNSPECIFIED CHRONICITY, UNSPECIFIED LATERALITY: Primary | ICD-10-CM

## 2023-03-27 DIAGNOSIS — M17.11 PRIMARY OSTEOARTHRITIS OF RIGHT KNEE: ICD-10-CM

## 2023-03-27 PROCEDURE — 73562 X-RAY EXAM OF KNEE 3: CPT | Performed by: ORTHOPAEDIC SURGERY

## 2023-03-27 RX ORDER — MELOXICAM 15 MG/1
15 TABLET ORAL DAILY
Qty: 30 TABLET | Refills: 0 | Status: SHIPPED | OUTPATIENT
Start: 2023-03-27

## 2023-03-27 RX ORDER — TRIAMCINOLONE ACETONIDE 40 MG/ML
40 INJECTION, SUSPENSION INTRA-ARTICULAR; INTRAMUSCULAR ONCE
Status: COMPLETED | OUTPATIENT
Start: 2023-03-27 | End: 2023-03-27

## 2023-03-27 RX ADMIN — TRIAMCINOLONE ACETONIDE 40 MG: 40 INJECTION, SUSPENSION INTRA-ARTICULAR; INTRAMUSCULAR at 11:15:00

## 2023-03-27 NOTE — PROGRESS NOTES
Per verbal order from Dr. Unruly Kidd, draw up and 4ml of 0.5% Marcaine and 1ml of Kenalog 40 for injection into right knee. Yeimy Guevara RN  Patient provided education handout for cortisone injection.

## 2023-03-30 RX ORDER — MELOXICAM 15 MG/1
TABLET ORAL
Qty: 90 TABLET | Refills: 0 | Status: SHIPPED | OUTPATIENT
Start: 2023-03-30

## 2023-03-30 NOTE — TELEPHONE ENCOUNTER
90 day supply  Rx request for Meloxicam 15mg, please review and sign off if appropriate. Thank you. Last seen: 3/27/23  Last refill: 3/27/23 #30 with 0 refills.

## 2023-05-05 ENCOUNTER — TELEPHONE (OUTPATIENT)
Dept: PHYSICAL THERAPY | Facility: HOSPITAL | Age: 63
End: 2023-05-05

## 2023-05-10 ENCOUNTER — OFFICE VISIT (OUTPATIENT)
Dept: PHYSICAL THERAPY | Age: 63
End: 2023-05-10
Attending: ORTHOPAEDIC SURGERY
Payer: COMMERCIAL

## 2023-05-10 DIAGNOSIS — M17.11 PRIMARY OSTEOARTHRITIS OF RIGHT KNEE: ICD-10-CM

## 2023-05-10 PROCEDURE — 97110 THERAPEUTIC EXERCISES: CPT

## 2023-05-10 PROCEDURE — 97161 PT EVAL LOW COMPLEX 20 MIN: CPT

## 2023-05-15 ENCOUNTER — OFFICE VISIT (OUTPATIENT)
Dept: PHYSICAL THERAPY | Age: 63
End: 2023-05-15
Attending: ORTHOPAEDIC SURGERY
Payer: COMMERCIAL

## 2023-05-15 PROCEDURE — 97110 THERAPEUTIC EXERCISES: CPT

## 2023-05-15 PROCEDURE — 97112 NEUROMUSCULAR REEDUCATION: CPT

## 2023-05-15 NOTE — PROGRESS NOTES
Diagnosis:   Primary osteoarthritis of right knee (M17.11)      Referring Provider: Huang Mcdonald  Date of Evaluation:    5/10/23    Precautions:  None Next MD visit:   none scheduled  Date of Surgery: n/a   Insurance Primary/Secondary: BCBS POS / N/A     # Auth Visits: 2/10            Subjective: Pt reports that he used his knee quite a bit over the weekend and was sore last night, but feels better this morning. Pain: 1/10      Objective: See table for program.    Reviewed HEP issued at [facility]: BLE squats, hamstring stretch, SL dynamic balance S pl. Educated pt in importance of not increasing pn with exercise. Assessment: Pt with good effort with all exercises. Difficulty with balance in all planes, visible weakness RLE vs LLE. Goals:   (to be met in 8 visits)  - Pt indep with HEP 5 of 7 days a week. - BLE MMT to 5/5 to allow for improved stair ambulation with min c/o at worst.  - RLE knee ROM equal to LLE to allow for improved ability to ride bike with min c/o at worst.  - SL dynamic balance x 5 reps in all planes with LOB to improve amb safety on all surfaces. Plan: Advance functional strengthening, balance, flexibility. Date: 5/15/2023  TX#: 2/10 Date:                 TX#: 3/ Date:                 TX#: 4/ Date:                 TX#: 5/ Date: Tx#: 6/   NM: 30 min  BLE squats to mat x 20  SL dynamic balance S, F and T pl x 10 B  NUSTEP seat 14, lv 6 x 6 min  Step ups 4 inch ant x 10  Steps ups 4 inch lat x 10       TE: 10 min  Standing HS stretch 15 sec x 3 B  Standing gastroc stretch 15 sec x 3  B HR off step x10                     HEP: No Change this visit.     Charges: NM2, TE       Total Timed Treatment: 40 min  Total Treatment Time: 42 min

## 2023-05-17 ENCOUNTER — OFFICE VISIT (OUTPATIENT)
Dept: PHYSICAL THERAPY | Age: 63
End: 2023-05-17
Attending: ORTHOPAEDIC SURGERY
Payer: COMMERCIAL

## 2023-05-17 DIAGNOSIS — M17.11 PRIMARY OSTEOARTHRITIS OF RIGHT KNEE: Primary | ICD-10-CM

## 2023-05-17 PROCEDURE — 97110 THERAPEUTIC EXERCISES: CPT

## 2023-05-17 PROCEDURE — 97112 NEUROMUSCULAR REEDUCATION: CPT

## 2023-05-17 NOTE — PROGRESS NOTES
Diagnosis:   Primary osteoarthritis of right knee (M17.11)      Referring Provider: Akilah Corado  Date of Evaluation:    5/10/23    Precautions:  None Next MD visit:   none scheduled  Date of Surgery: n/a   Insurance Primary/Secondary: BCBS POS / N/A     # Auth Visits: 2/10            Subjective: Pt reports that his knee is bothering him a little more today, but he was driving a lot yesterday for work. Pt is doing HEP. Pain: 2/10      Objective: See flowsheet below      Assessment: Pt is motivated, and was able to increase range and reps on most exercises this session. R hip with decreased stability as compared to L during squats and SLS. Goals:   (to be met in 8 visits)  - Pt indep with HEP 5 of 7 days a week. (in progress)  - BLE MMT to 5/5 to allow for improved stair ambulation with min c/o at worst. (in progress)  - RLE knee ROM equal to LLE to allow for improved ability to ride bike with min c/o at worst. (in progress)  - SL dynamic balance x 5 reps in all planes with LOB to improve amb safety on all surfaces. (in progress)      Plan: Advance functional strengthening, balance, flexibility. Date: 5/15/2023  TX#: 2/10 Date: 5/17/2023             TX#: 3/10 Date:                 TX#: 4/ Date:                 TX#: 5/ Date: Tx#: 6/   NM: 30 min  BLE squats to mat x 20  SL dynamic balance S, F and T pl x 10 B  NUSTEP seat 14, lv 6 x 6 min  Step ups 4 inch ant x 10  Steps ups 4 inch lat x 10 NM: 30 min  NUSTEP seat 14, lv 6 x12 min  Step ups R/L 6 inch ant x 10  Steps ups R/L 6 inch lat x 10  SL static stand on airex 10\" x3 R/L alt  Sit<>stand squat from mat 2x10, 2nd set with hip add ball squeeze and exhale      TE: 10 min  Standing HS stretch 15 sec x 3 B  Standing gastroc stretch 15 sec x 3  B HR off step x10 TE: 10 min  Standing HS stretch foot on 2nd step 15 sec x 3 B  Standing gastroc stretch 15 sec x 3  B HR off step x10                    HEP: No Change this visit.     Charges: 2 NM re-ed, 1 TE Total Timed Treatment: 40 min  Total Treatment Time: 42 min

## 2023-05-22 ENCOUNTER — TELEPHONE (OUTPATIENT)
Dept: PHYSICAL THERAPY | Facility: HOSPITAL | Age: 63
End: 2023-05-22

## 2023-05-22 ENCOUNTER — APPOINTMENT (OUTPATIENT)
Dept: PHYSICAL THERAPY | Age: 63
End: 2023-05-22
Attending: ORTHOPAEDIC SURGERY
Payer: COMMERCIAL

## 2023-05-24 ENCOUNTER — OFFICE VISIT (OUTPATIENT)
Dept: PHYSICAL THERAPY | Age: 63
End: 2023-05-24
Attending: ORTHOPAEDIC SURGERY
Payer: COMMERCIAL

## 2023-05-24 PROCEDURE — 97112 NEUROMUSCULAR REEDUCATION: CPT

## 2023-05-24 PROCEDURE — 97110 THERAPEUTIC EXERCISES: CPT

## 2023-05-24 NOTE — PROGRESS NOTES
Diagnosis:   Primary osteoarthritis of right knee (M17.11)      Referring Provider: Klaus Conner  Date of Evaluation:    5/10/23    Precautions:  None Next MD visit:   none scheduled  Date of Surgery: n/a   Insurance Primary/Secondary: BCBS POS / N/A     # Auth Visits: 4/10            Subjective: Pt reports that his knee is slowly becoming more sore as the injection is slowly wearing off. Pain ant/medial aspect of knee. Pt reports completing HEP regularly. \"My goal is to get a new knee. \" per pt    Pain: 2/10      Objective: See flowsheet below      Assessment: Pt demonstrates good completion and understanding of all exercises. Goals:   (to be met in 8 visits)  - Pt indep with HEP 5 of 7 days a week. Met  - BLE MMT to 5/5 to allow for improved stair ambulation with min c/o at worst. In progress   - RLE knee ROM equal to LLE to allow for improved ability to ride bike with min c/o at worst. (in progress)  - SL dynamic balance x 5 reps in all planes with LOB to improve amb safety on all surfaces. (in progress)      Plan: Advance functional strengthening, balance, flexibility. Date: 5/15/2023  TX#: 2/10 Date: 5/17/2023             TX#: 3/10 Date:   5/24/23              TX#: 4/10 Date:                 TX#: 5/ Date:    Tx#: 6/   NM: 30 min  BLE squats to mat x 20  SL dynamic balance S, F and T pl x 10 B  NUSTEP seat 14, lv 6 x 6 min  Step ups 4 inch ant x 10  Steps ups 4 inch lat x 10 NM: 30 min  NUSTEP seat 14, lv 6 x12 min  Step ups R/L 6 inch ant x 10  Steps ups R/L 6 inch lat x 10  SL static stand on airex 10\" x3 R/L alt  Sit<>stand squat from mat 2x10, 2nd set with hip add ball squeeze and exhale NM: 30 min  NUSTEP seat 14, lv 6 x 6 min  Step ups R/L 6 inch ant x 10  Steps ups R/L 6 inch lat x 10  SL dynamic bal in S, F and T pl  Sit<>stand squat from mat x 20  Lunges in S and F pl x 10 B   TE: 10 min  Standing HS stretch 15 sec x 3 B  Standing gastroc stretch 15 sec x 3  B HR off step x 2/10        TE: 10 min  Standing HS stretch 15 sec x 3 B  Standing gastroc stretch 15 sec x 3  B HR off step x10 TE: 10 min  Standing HS stretch foot on 2nd step 15 sec x 3 B  Standing gastroc stretch 15 sec x 3  B HR off step x10                    HEP: Step ups ant and lat, lunges S and F pl    Charges: 2 NM re-ed, 1 TE     Total Timed Treatment: 40 min  Total Treatment Time: 42 min

## 2023-06-05 ENCOUNTER — OFFICE VISIT (OUTPATIENT)
Dept: PHYSICAL THERAPY | Age: 63
End: 2023-06-05
Attending: ORTHOPAEDIC SURGERY
Payer: COMMERCIAL

## 2023-06-05 PROCEDURE — 97110 THERAPEUTIC EXERCISES: CPT

## 2023-06-05 PROCEDURE — 97112 NEUROMUSCULAR REEDUCATION: CPT

## 2023-06-05 NOTE — PROGRESS NOTES
Diagnosis:   Primary osteoarthritis of right knee (M17.11)      Referring Provider: Nadeem Leong  Date of Evaluation:    5/10/23    Precautions:  None Next MD visit:   none scheduled  Date of Surgery: n/a   Insurance Primary/Secondary: BCBS POS / N/A     # Auth Visits: 5/10            Subjective: Pt reports that he feels like the injection has worn off and his right knee is getting sore and stiff again. Pt reports completing HEP regularly. Pt reports medial aspect of right knee is primary area of discomfort. Pain: 4/10      Objective: See flowsheet below  Knee   Flexion: R 122; L 130  Extension: R 3; L 3       Assessment: Pt demonstrates good completion and understanding of all exercises. Pt with increasing discomfort and slight decrease in ROM as injection wears off in right knee. Goals:   (to be met in 8 visits)  - Pt indep with HEP 5 of 7 days a week. Met  - BLE MMT to 5/5 to allow for improved stair ambulation with min c/o at worst. Unmet (mod pain)  - RLE knee ROM equal to LLE to allow for improved ability to ride bike with min c/o at worst. Unmet (mod pain)  - SL dynamic balance x 5 reps in all planes with LOB to improve amb safety on all surfaces. Unmet, but improved balance noted. Plan: Pt is discharged from skilled PT at this time. To continue with HEP, contacting PT with any questions or concerns. Patient/Family/Caregiver was advised of these findings, precautions, and treatment options and has agreed to actively participate in planning and for this course of care. Thank you for your referral. If you have any questions, please contact me at Dept: [phone]. Sincerely,  Electronically signed by therapist: Natty Arriaga PT     Physician's certification required:  No  Please co-sign or sign and return this letter via fax as soon as possible to [phone].    I certify the need for these services furnished under this plan of treatment and while under my care.    X___________________________________________________ Date____________________    Certification From: 1/4/3993  To:9/3/2023   Date: 5/15/2023  TX#: 2/10 Date: 5/17/2023             TX#: 3/10 Date:   5/24/23              TX#: 4/10 Date:  6/5/23               TX#: 5/10   NM: 30 min  BLE squats to mat x 20  SL dynamic balance S, F and T pl x 10 B  NUSTEP seat 14, lv 6 x 6 min  Step ups 4 inch ant x 10  Steps ups 4 inch lat x 10 NM: 30 min  NUSTEP seat 14, lv 6 x12 min  Step ups R/L 6 inch ant x 10  Steps ups R/L 6 inch lat x 10  SL static stand on airex 10\" x3 R/L alt  Sit<>stand squat from mat 2x10, 2nd set with hip add ball squeeze and exhale NM: 30 min  NUSTEP seat 14, lv 6 x 6 min  Step ups R/L 6 inch ant x 10  Steps ups R/L 6 inch lat x 10  SL dynamic bal in S, F and T pl  Sit<>stand squat from mat x 20  Lunges in S and F pl x 10 B   TE: 10 min  Standing HS stretch 15 sec x 3 B  Standing gastroc stretch 15 sec x 3  B HR off step x 2/10    NM: 25 min  NUSTEP seat 14, lv 6 x 8 min  Step ups R/L 6 inch ant x 10  Steps ups R/L 6 inch lat x 10  SL dynamic bal in S, F and T pl  Lunges in S and F pl x 10 B   TE: 10 min  Standing HS stretch 15 sec x 3 B  Standing gastroc stretch 15 sec x 3  B HR off step x 2/10      TE: 10 min  Standing HS stretch 15 sec x 3 B  Standing gastroc stretch 15 sec x 3  B HR off step x10 TE: 10 min  Standing HS stretch foot on 2nd step 15 sec x 3 B  Standing gastroc stretch 15 sec x 3  B HR off step x10                 HEP: Step ups ant and lat, lunges S and F pl    Charges: 2 NM, 1 TE     Total Timed Treatment: 35 min  Total Treatment Time: 38 min

## 2023-06-07 ENCOUNTER — APPOINTMENT (OUTPATIENT)
Dept: PHYSICAL THERAPY | Age: 63
End: 2023-06-07
Attending: ORTHOPAEDIC SURGERY
Payer: COMMERCIAL

## 2023-07-06 RX ORDER — MELOXICAM 15 MG/1
TABLET ORAL
Qty: 90 TABLET | Refills: 0 | Status: SHIPPED | OUTPATIENT
Start: 2023-07-06

## 2023-07-06 NOTE — TELEPHONE ENCOUNTER
Rx request for Meloxicam 15mg, please review and sign off if appropriate. Thank you. Last seen: 3/27/23  Last refill: 3/30/23 #90 with 0 refills.

## 2023-08-11 ENCOUNTER — TELEPHONE (OUTPATIENT)
Dept: ORTHOPEDICS CLINIC | Facility: CLINIC | Age: 63
End: 2023-08-11

## 2023-08-11 NOTE — TELEPHONE ENCOUNTER
Current Outpatient Medications:     MELOXICAM 15 MG Oral Tab, TAKE 1 TABLET(15 MG) BY MOUTH DAILY, Disp: 90 tablet, Rfl: 0    90 day Refill request

## 2023-09-19 ENCOUNTER — HOSPITAL ENCOUNTER (OUTPATIENT)
Dept: GENERAL RADIOLOGY | Age: 63
Discharge: HOME | End: 2023-09-19
Attending: INTERNAL MEDICINE
Payer: COMMERCIAL

## 2023-09-19 ENCOUNTER — LAB ENCOUNTER (OUTPATIENT)
Dept: LAB | Age: 63
End: 2023-09-19
Attending: INTERNAL MEDICINE
Payer: COMMERCIAL

## 2023-09-19 DIAGNOSIS — Z01.818 PREOPERATIVE TESTING: Primary | ICD-10-CM

## 2023-09-19 DIAGNOSIS — Z01.818 PREOPERATIVE TESTING: ICD-10-CM

## 2023-09-19 LAB
ALBUMIN SERPL-MCNC: 4.3 G/DL (ref 3.4–5)
ALBUMIN/GLOB SERPL: 1.4 {RATIO} (ref 1–2)
ALP LIVER SERPL-CCNC: 57 U/L
ALT SERPL-CCNC: 52 U/L
ANION GAP SERPL CALC-SCNC: 5 MMOL/L (ref 0–18)
AST SERPL-CCNC: 25 U/L (ref 15–37)
BASOPHILS # BLD AUTO: 0.06 X10(3) UL (ref 0–0.2)
BASOPHILS NFR BLD AUTO: 0.8 %
BILIRUB SERPL-MCNC: 1.1 MG/DL (ref 0.1–2)
BUN BLD-MCNC: 17 MG/DL (ref 7–18)
BUN/CREAT SERPL: 14 (ref 10–20)
CALCIUM BLD-MCNC: 9.5 MG/DL (ref 8.5–10.1)
CHLORIDE SERPL-SCNC: 108 MMOL/L (ref 98–112)
CO2 SERPL-SCNC: 27 MMOL/L (ref 21–32)
CREAT BLD-MCNC: 1.21 MG/DL
DEPRECATED RDW RBC AUTO: 42.5 FL (ref 35.1–46.3)
EGFRCR SERPLBLD CKD-EPI 2021: 67 ML/MIN/1.73M2 (ref 60–?)
EOSINOPHIL # BLD AUTO: 0.16 X10(3) UL (ref 0–0.7)
EOSINOPHIL NFR BLD AUTO: 2.2 %
ERYTHROCYTE [DISTWIDTH] IN BLOOD BY AUTOMATED COUNT: 13.2 % (ref 11–15)
FASTING STATUS PATIENT QL REPORTED: NO
GLOBULIN PLAS-MCNC: 3.1 G/DL (ref 2.8–4.4)
GLUCOSE BLD-MCNC: 113 MG/DL (ref 70–99)
HCT VFR BLD AUTO: 48.9 %
HGB BLD-MCNC: 16.8 G/DL
IMM GRANULOCYTES # BLD AUTO: 0.01 X10(3) UL (ref 0–1)
IMM GRANULOCYTES NFR BLD: 0.1 %
LYMPHOCYTES # BLD AUTO: 1.51 X10(3) UL (ref 1–4)
LYMPHOCYTES NFR BLD AUTO: 20.7 %
MCH RBC QN AUTO: 30.2 PG (ref 26–34)
MCHC RBC AUTO-ENTMCNC: 34.4 G/DL (ref 31–37)
MCV RBC AUTO: 87.8 FL
MONOCYTES # BLD AUTO: 0.55 X10(3) UL (ref 0.1–1)
MONOCYTES NFR BLD AUTO: 7.6 %
NEUTROPHILS # BLD AUTO: 4.99 X10 (3) UL (ref 1.5–7.7)
NEUTROPHILS # BLD AUTO: 4.99 X10(3) UL (ref 1.5–7.7)
NEUTROPHILS NFR BLD AUTO: 68.6 %
OSMOLALITY SERPL CALC.SUM OF ELEC: 292 MOSM/KG (ref 275–295)
PLATELET # BLD AUTO: 234 10(3)UL (ref 150–450)
POTASSIUM SERPL-SCNC: 5.3 MMOL/L (ref 3.5–5.1)
PROT SERPL-MCNC: 7.4 G/DL (ref 6.4–8.2)
RBC # BLD AUTO: 5.57 X10(6)UL
SODIUM SERPL-SCNC: 140 MMOL/L (ref 136–145)
WBC # BLD AUTO: 7.3 X10(3) UL (ref 4–11)

## 2023-09-19 PROCEDURE — 36415 COLL VENOUS BLD VENIPUNCTURE: CPT

## 2023-09-19 PROCEDURE — 80053 COMPREHEN METABOLIC PANEL: CPT

## 2023-09-19 PROCEDURE — 71046 X-RAY EXAM CHEST 2 VIEWS: CPT | Performed by: INTERNAL MEDICINE

## 2023-09-19 PROCEDURE — 85025 COMPLETE CBC W/AUTO DIFF WBC: CPT

## 2023-09-20 NOTE — H&P
[facility]    Cardiac Electrophysiology H&P Addendum    Glenroy HARMAN [id number] MRN [id number]   Admission Date 9/25/2023 Procedure Date 9/25/2023   Attending Physician Juliana Montalvo MD Procedure Physician Shea Cantrell MD       H&P ADDENDUM    I personally reviewed the attached H&P on 9/25/2023, and no significant changes have occurred in the patient's condition since the H&P was performed. Risks, alternatives, and benefits of procedure were again reviewed at bedside with the patient. They have no additional questions and wish to proceed. Shea Cantrell M.D.    Cardiac Electrophysiology       -----------------------------------------

## 2023-09-25 ENCOUNTER — ANESTHESIA (OUTPATIENT)
Dept: INTERVENTIONAL RADIOLOGY/VASCULAR | Facility: HOSPITAL | Age: 63
End: 2023-09-25
Payer: COMMERCIAL

## 2023-09-25 ENCOUNTER — HOSPITAL ENCOUNTER (OUTPATIENT)
Dept: INTERVENTIONAL RADIOLOGY/VASCULAR | Facility: HOSPITAL | Age: 63
Discharge: HOME | End: 2023-09-25
Attending: INTERNAL MEDICINE | Admitting: INTERNAL MEDICINE
Payer: COMMERCIAL

## 2023-09-25 VITALS
BODY MASS INDEX: 34.1 KG/M2 | WEIGHT: 280 LBS | TEMPERATURE: 98 F | OXYGEN SATURATION: 97 % | DIASTOLIC BLOOD PRESSURE: 79 MMHG | HEART RATE: 62 BPM | HEIGHT: 76 IN | RESPIRATION RATE: 18 BRPM | SYSTOLIC BLOOD PRESSURE: 110 MMHG

## 2023-09-25 DIAGNOSIS — I48.91 ATRIAL FIBRILLATION (HCC): ICD-10-CM

## 2023-09-25 LAB
ATRIAL RATE: 65 BPM
ISTAT ACTIVATED CLOTTING TIME: 311 SECONDS (ref 125–137)
ISTAT ACTIVATED CLOTTING TIME: 323 SECONDS (ref 125–137)
P AXIS: 66 DEGREES
P-R INTERVAL: 186 MS
POTASSIUM SERPL-SCNC: 4.7 MMOL/L (ref 3.5–5.1)
Q-T INTERVAL: 450 MS
QRS DURATION: 106 MS
QTC CALCULATION (BEZET): 468 MS
R AXIS: 71 DEGREES
T AXIS: 41 DEGREES
VENTRICULAR RATE: 65 BPM

## 2023-09-25 PROCEDURE — B246ZZZ ULTRASONOGRAPHY OF RIGHT AND LEFT HEART: ICD-10-PCS | Performed by: INTERNAL MEDICINE

## 2023-09-25 PROCEDURE — 02K83ZZ MAP CONDUCTION MECHANISM, PERCUTANEOUS APPROACH: ICD-10-PCS | Performed by: INTERNAL MEDICINE

## 2023-09-25 PROCEDURE — 36415 COLL VENOUS BLD VENIPUNCTURE: CPT

## 2023-09-25 PROCEDURE — 93609 INTRA-VNTR MAPG TCHYCAR SITE: CPT | Performed by: INTERNAL MEDICINE

## 2023-09-25 PROCEDURE — 4A023FZ MEASUREMENT OF CARDIAC RHYTHM, PERCUTANEOUS APPROACH: ICD-10-PCS | Performed by: INTERNAL MEDICINE

## 2023-09-25 PROCEDURE — 02583ZZ DESTRUCTION OF CONDUCTION MECHANISM, PERCUTANEOUS APPROACH: ICD-10-PCS | Performed by: INTERNAL MEDICINE

## 2023-09-25 PROCEDURE — 93005 ELECTROCARDIOGRAM TRACING: CPT

## 2023-09-25 PROCEDURE — 93655 ICAR CATH ABLTJ DSCRT ARRHYT: CPT | Performed by: INTERNAL MEDICINE

## 2023-09-25 PROCEDURE — 93010 ELECTROCARDIOGRAM REPORT: CPT | Performed by: INTERNAL MEDICINE

## 2023-09-25 PROCEDURE — 84132 ASSAY OF SERUM POTASSIUM: CPT | Performed by: INTERNAL MEDICINE

## 2023-09-25 PROCEDURE — 93623 PRGRMD STIMJ&PACG IV RX NFS: CPT | Performed by: INTERNAL MEDICINE

## 2023-09-25 PROCEDURE — 93656 COMPRE EP EVAL ABLTJ ATR FIB: CPT | Performed by: INTERNAL MEDICINE

## 2023-09-25 PROCEDURE — 85347 COAGULATION TIME ACTIVATED: CPT

## 2023-09-25 PROCEDURE — 4A0234Z MEASUREMENT OF CARDIAC ELECTRICAL ACTIVITY, PERCUTANEOUS APPROACH: ICD-10-PCS | Performed by: INTERNAL MEDICINE

## 2023-09-25 RX ORDER — MIDAZOLAM HYDROCHLORIDE 1 MG/ML
INJECTION INTRAMUSCULAR; INTRAVENOUS
Status: COMPLETED
Start: 2023-09-25 | End: 2023-09-25

## 2023-09-25 RX ORDER — SODIUM CHLORIDE, SODIUM LACTATE, POTASSIUM CHLORIDE, CALCIUM CHLORIDE 600; 310; 30; 20 MG/100ML; MG/100ML; MG/100ML; MG/100ML
INJECTION, SOLUTION INTRAVENOUS CONTINUOUS PRN
Status: DISCONTINUED | OUTPATIENT
Start: 2023-09-25 | End: 2023-09-25 | Stop reason: SURG

## 2023-09-25 RX ORDER — MORPHINE SULFATE 4 MG/ML
4 INJECTION, SOLUTION INTRAMUSCULAR; INTRAVENOUS EVERY 10 MIN PRN
Status: DISCONTINUED | OUTPATIENT
Start: 2023-09-25 | End: 2023-09-25 | Stop reason: HOSPADM

## 2023-09-25 RX ORDER — LIDOCAINE HYDROCHLORIDE 20 MG/ML
INJECTION, SOLUTION INFILTRATION; PERINEURAL
Status: COMPLETED
Start: 2023-09-25 | End: 2023-09-25

## 2023-09-25 RX ORDER — SOTALOL HYDROCHLORIDE 160 MG/1
160 TABLET ORAL 2 TIMES DAILY
Qty: 60 TABLET | Refills: 3 | Status: SHIPPED | OUTPATIENT
Start: 2023-09-25 | End: 2023-09-25

## 2023-09-25 RX ORDER — METOPROLOL TARTRATE 50 MG/1
25 TABLET, FILM COATED ORAL 2 TIMES DAILY
Qty: 180 TABLET | Refills: 3 | Status: SHIPPED | OUTPATIENT
Start: 2023-09-25 | End: 2023-09-25

## 2023-09-25 RX ORDER — SODIUM CHLORIDE, SODIUM LACTATE, POTASSIUM CHLORIDE, CALCIUM CHLORIDE 600; 310; 30; 20 MG/100ML; MG/100ML; MG/100ML; MG/100ML
INJECTION, SOLUTION INTRAVENOUS CONTINUOUS
Status: DISCONTINUED | OUTPATIENT
Start: 2023-09-25 | End: 2023-09-25 | Stop reason: HOSPADM

## 2023-09-25 RX ORDER — PROTAMINE SULFATE 10 MG/ML
INJECTION, SOLUTION INTRAVENOUS AS NEEDED
Status: DISCONTINUED | OUTPATIENT
Start: 2023-09-25 | End: 2023-09-25 | Stop reason: SURG

## 2023-09-25 RX ORDER — METOPROLOL TARTRATE 50 MG/1
25 TABLET, FILM COATED ORAL 2 TIMES DAILY
Qty: 180 TABLET | Refills: 3 | Status: SHIPPED | OUTPATIENT
Start: 2023-09-25

## 2023-09-25 RX ORDER — ACETAMINOPHEN AND CODEINE PHOSPHATE 300; 30 MG/1; MG/1
2 TABLET ORAL EVERY 4 HOURS PRN
Status: DISCONTINUED | OUTPATIENT
Start: 2023-09-25 | End: 2023-09-25

## 2023-09-25 RX ORDER — ACETAMINOPHEN AND CODEINE PHOSPHATE 300; 30 MG/1; MG/1
1 TABLET ORAL EVERY 4 HOURS PRN
Status: DISCONTINUED | OUTPATIENT
Start: 2023-09-25 | End: 2023-09-25

## 2023-09-25 RX ORDER — SODIUM CHLORIDE 9 MG/ML
INJECTION, SOLUTION INTRAVENOUS CONTINUOUS
Status: DISCONTINUED | OUTPATIENT
Start: 2023-09-25 | End: 2023-09-25

## 2023-09-25 RX ORDER — NALOXONE HYDROCHLORIDE 0.4 MG/ML
0.08 INJECTION, SOLUTION INTRAMUSCULAR; INTRAVENOUS; SUBCUTANEOUS AS NEEDED
Status: DISCONTINUED | OUTPATIENT
Start: 2023-09-25 | End: 2023-09-25 | Stop reason: HOSPADM

## 2023-09-25 RX ORDER — LIDOCAINE HYDROCHLORIDE 10 MG/ML
INJECTION, SOLUTION EPIDURAL; INFILTRATION; INTRACAUDAL; PERINEURAL AS NEEDED
Status: DISCONTINUED | OUTPATIENT
Start: 2023-09-25 | End: 2023-09-25 | Stop reason: SURG

## 2023-09-25 RX ORDER — ROCURONIUM BROMIDE 10 MG/ML
INJECTION, SOLUTION INTRAVENOUS AS NEEDED
Status: DISCONTINUED | OUTPATIENT
Start: 2023-09-25 | End: 2023-09-25 | Stop reason: SURG

## 2023-09-25 RX ORDER — ISOPROTERENOL HYDROCHLORIDE 0.2 MG/ML
INJECTION, SOLUTION INTRAMUSCULAR; INTRAVENOUS
Status: COMPLETED
Start: 2023-09-25 | End: 2023-09-25

## 2023-09-25 RX ORDER — ACETAMINOPHEN 325 MG/1
650 TABLET ORAL EVERY 4 HOURS PRN
Status: DISCONTINUED | OUTPATIENT
Start: 2023-09-25 | End: 2023-09-25

## 2023-09-25 RX ORDER — MORPHINE SULFATE 10 MG/ML
6 INJECTION, SOLUTION INTRAMUSCULAR; INTRAVENOUS EVERY 10 MIN PRN
Status: DISCONTINUED | OUTPATIENT
Start: 2023-09-25 | End: 2023-09-25 | Stop reason: HOSPADM

## 2023-09-25 RX ORDER — HEPARIN SODIUM 1000 [USP'U]/ML
INJECTION, SOLUTION INTRAVENOUS; SUBCUTANEOUS
Status: COMPLETED
Start: 2023-09-25 | End: 2023-09-25

## 2023-09-25 RX ORDER — HYDROMORPHONE HYDROCHLORIDE 1 MG/ML
0.6 INJECTION, SOLUTION INTRAMUSCULAR; INTRAVENOUS; SUBCUTANEOUS EVERY 5 MIN PRN
Status: DISCONTINUED | OUTPATIENT
Start: 2023-09-25 | End: 2023-09-25 | Stop reason: HOSPADM

## 2023-09-25 RX ORDER — HEPARIN SODIUM 1000 [USP'U]/ML
INJECTION, SOLUTION INTRAVENOUS; SUBCUTANEOUS AS NEEDED
Status: DISCONTINUED | OUTPATIENT
Start: 2023-09-25 | End: 2023-09-25 | Stop reason: SURG

## 2023-09-25 RX ORDER — PROTAMINE SULFATE 10 MG/ML
INJECTION, SOLUTION INTRAVENOUS
Status: COMPLETED
Start: 2023-09-25 | End: 2023-09-25

## 2023-09-25 RX ORDER — HYDROMORPHONE HYDROCHLORIDE 1 MG/ML
0.2 INJECTION, SOLUTION INTRAMUSCULAR; INTRAVENOUS; SUBCUTANEOUS EVERY 5 MIN PRN
Status: DISCONTINUED | OUTPATIENT
Start: 2023-09-25 | End: 2023-09-25 | Stop reason: HOSPADM

## 2023-09-25 RX ORDER — SOTALOL HYDROCHLORIDE 160 MG/1
160 TABLET ORAL 2 TIMES DAILY
Qty: 60 TABLET | Refills: 3 | Status: SHIPPED | OUTPATIENT
Start: 2023-09-25 | End: 2024-01-23

## 2023-09-25 RX ORDER — MIDAZOLAM HYDROCHLORIDE 1 MG/ML
INJECTION INTRAMUSCULAR; INTRAVENOUS AS NEEDED
Status: DISCONTINUED | OUTPATIENT
Start: 2023-09-25 | End: 2023-09-25 | Stop reason: SURG

## 2023-09-25 RX ORDER — PHENYLEPHRINE HCL 10 MG/ML
VIAL (ML) INJECTION AS NEEDED
Status: DISCONTINUED | OUTPATIENT
Start: 2023-09-25 | End: 2023-09-25 | Stop reason: SURG

## 2023-09-25 RX ORDER — HYDROMORPHONE HYDROCHLORIDE 1 MG/ML
0.4 INJECTION, SOLUTION INTRAMUSCULAR; INTRAVENOUS; SUBCUTANEOUS EVERY 5 MIN PRN
Status: DISCONTINUED | OUTPATIENT
Start: 2023-09-25 | End: 2023-09-25 | Stop reason: HOSPADM

## 2023-09-25 RX ORDER — MORPHINE SULFATE 2 MG/ML
2 INJECTION, SOLUTION INTRAMUSCULAR; INTRAVENOUS EVERY 10 MIN PRN
Status: DISCONTINUED | OUTPATIENT
Start: 2023-09-25 | End: 2023-09-25 | Stop reason: HOSPADM

## 2023-09-25 RX ADMIN — PROTAMINE SULFATE 100 MG: 10 INJECTION, SOLUTION INTRAVENOUS at 10:56:00

## 2023-09-25 RX ADMIN — PHENYLEPHRINE HCL 100 MCG: 10 MG/ML VIAL (ML) INJECTION at 09:39:00

## 2023-09-25 RX ADMIN — ROCURONIUM BROMIDE 10 MG: 10 INJECTION, SOLUTION INTRAVENOUS at 09:25:00

## 2023-09-25 RX ADMIN — MIDAZOLAM HYDROCHLORIDE 2 MG: 1 INJECTION INTRAMUSCULAR; INTRAVENOUS at 09:25:00

## 2023-09-25 RX ADMIN — HEPARIN SODIUM 5000 UNITS: 1000 INJECTION, SOLUTION INTRAVENOUS; SUBCUTANEOUS at 09:58:00

## 2023-09-25 RX ADMIN — SODIUM CHLORIDE: 9 INJECTION, SOLUTION INTRAVENOUS at 07:30:00

## 2023-09-25 RX ADMIN — HEPARIN SODIUM 10000 UNITS: 1000 INJECTION, SOLUTION INTRAVENOUS; SUBCUTANEOUS at 10:00:00

## 2023-09-25 RX ADMIN — HEPARIN SODIUM 2500 UNITS: 1000 INJECTION, SOLUTION INTRAVENOUS; SUBCUTANEOUS at 10:19:00

## 2023-09-25 RX ADMIN — PHENYLEPHRINE HCL 100 MCG: 10 MG/ML VIAL (ML) INJECTION at 09:54:00

## 2023-09-25 RX ADMIN — SODIUM CHLORIDE, SODIUM LACTATE, POTASSIUM CHLORIDE, CALCIUM CHLORIDE: 600; 310; 30; 20 INJECTION, SOLUTION INTRAVENOUS at 09:12:00

## 2023-09-25 RX ADMIN — LIDOCAINE HYDROCHLORIDE 50 MG: 10 INJECTION, SOLUTION EPIDURAL; INFILTRATION; INTRACAUDAL; PERINEURAL at 09:25:00

## 2023-09-25 RX ADMIN — SODIUM CHLORIDE, SODIUM LACTATE, POTASSIUM CHLORIDE, CALCIUM CHLORIDE: 600; 310; 30; 20 INJECTION, SOLUTION INTRAVENOUS at 11:16:00

## 2023-09-25 NOTE — IVS NOTE
DISCHARGE NOTE     Pt is able to sit up and ambulate without difficulty. Pt voided and tolerated fluids and food. Procedural site remains dry and intact with good circulation, motion, and sensation. No signs and symptoms of bleeding/hematoma noted. IV access removed  Instruction provided, patient/family verbalizes understanding. Dr. Ifeoma Oscar spoke with patient/family post procedure.      Pt discharge via wheelchair to [address] [place] [address] B    Follow up Appointment: as scheduled    New Prescription: as ordered

## 2024-06-06 ENCOUNTER — LAB ENCOUNTER (OUTPATIENT)
Dept: LAB | Age: 64
End: 2024-06-06
Attending: INTERNAL MEDICINE
Payer: COMMERCIAL

## 2024-06-06 DIAGNOSIS — Z01.818 PREOP TESTING: Primary | ICD-10-CM

## 2024-06-06 LAB
ALBUMIN SERPL-MCNC: 4.9 G/DL (ref 3.2–4.8)
ALBUMIN/GLOB SERPL: 1.8 {RATIO} (ref 1–2)
ALP LIVER SERPL-CCNC: 63 U/L
ALT SERPL-CCNC: 33 U/L
ANION GAP SERPL CALC-SCNC: 7 MMOL/L (ref 0–18)
AST SERPL-CCNC: 24 U/L (ref ?–34)
BASOPHILS # BLD AUTO: 0.06 X10(3) UL (ref 0–0.2)
BASOPHILS NFR BLD AUTO: 0.6 %
BILIRUB SERPL-MCNC: 0.8 MG/DL (ref 0.2–1.1)
BUN BLD-MCNC: 18 MG/DL (ref 9–23)
BUN/CREAT SERPL: 16.8 (ref 10–20)
CALCIUM BLD-MCNC: 9.7 MG/DL (ref 8.7–10.4)
CHLORIDE SERPL-SCNC: 106 MMOL/L (ref 98–112)
CO2 SERPL-SCNC: 27 MMOL/L (ref 21–32)
CREAT BLD-MCNC: 1.07 MG/DL
DEPRECATED RDW RBC AUTO: 42.8 FL (ref 35.1–46.3)
EGFRCR SERPLBLD CKD-EPI 2021: 77 ML/MIN/1.73M2 (ref 60–?)
EOSINOPHIL # BLD AUTO: 0.28 X10(3) UL (ref 0–0.7)
EOSINOPHIL NFR BLD AUTO: 2.8 %
ERYTHROCYTE [DISTWIDTH] IN BLOOD BY AUTOMATED COUNT: 12.9 % (ref 11–15)
FASTING STATUS PATIENT QL REPORTED: YES
GLOBULIN PLAS-MCNC: 2.7 G/DL (ref 2–3.5)
GLUCOSE BLD-MCNC: 109 MG/DL (ref 70–99)
HCT VFR BLD AUTO: 51.8 %
HGB BLD-MCNC: 17.6 G/DL
IMM GRANULOCYTES # BLD AUTO: 0.03 X10(3) UL (ref 0–1)
IMM GRANULOCYTES NFR BLD: 0.3 %
LYMPHOCYTES # BLD AUTO: 2.22 X10(3) UL (ref 1–4)
LYMPHOCYTES NFR BLD AUTO: 22.2 %
MCH RBC QN AUTO: 30.8 PG (ref 26–34)
MCHC RBC AUTO-ENTMCNC: 34 G/DL (ref 31–37)
MCV RBC AUTO: 90.6 FL
MONOCYTES # BLD AUTO: 0.79 X10(3) UL (ref 0.1–1)
MONOCYTES NFR BLD AUTO: 7.9 %
NEUTROPHILS # BLD AUTO: 6.6 X10 (3) UL (ref 1.5–7.7)
NEUTROPHILS # BLD AUTO: 6.6 X10(3) UL (ref 1.5–7.7)
NEUTROPHILS NFR BLD AUTO: 66.2 %
OSMOLALITY SERPL CALC.SUM OF ELEC: 292 MOSM/KG (ref 275–295)
PLATELET # BLD AUTO: 264 10(3)UL (ref 150–450)
POTASSIUM SERPL-SCNC: 4.8 MMOL/L (ref 3.5–5.1)
PROT SERPL-MCNC: 7.6 G/DL (ref 5.7–8.2)
RBC # BLD AUTO: 5.72 X10(6)UL
SODIUM SERPL-SCNC: 140 MMOL/L (ref 136–145)
WBC # BLD AUTO: 10 X10(3) UL (ref 4–11)

## 2024-06-06 PROCEDURE — 85025 COMPLETE CBC W/AUTO DIFF WBC: CPT

## 2024-06-06 PROCEDURE — 36415 COLL VENOUS BLD VENIPUNCTURE: CPT

## 2024-06-06 PROCEDURE — 80053 COMPREHEN METABOLIC PANEL: CPT

## 2024-06-12 RX ORDER — SODIUM CHLORIDE 9 MG/ML
INJECTION, SOLUTION INTRAVENOUS CONTINUOUS
OUTPATIENT
Start: 2024-06-12

## 2024-06-28 ENCOUNTER — HOSPITAL ENCOUNTER (OUTPATIENT)
Dept: INTERVENTIONAL RADIOLOGY/VASCULAR | Facility: HOSPITAL | Age: 64
Discharge: HOME | End: 2024-06-28
Attending: INTERNAL MEDICINE
Payer: COMMERCIAL

## 2024-08-12 ENCOUNTER — HOSPITAL ENCOUNTER (OUTPATIENT)
Dept: GENERAL RADIOLOGY | Facility: HOSPITAL | Age: 64
Discharge: HOME | End: 2024-08-12
Attending: ORTHOPAEDIC SURGERY
Payer: COMMERCIAL

## 2024-08-12 ENCOUNTER — OFFICE VISIT (OUTPATIENT)
Dept: ORTHOPEDICS CLINIC | Facility: CLINIC | Age: 64
End: 2024-08-12
Payer: COMMERCIAL

## 2024-08-12 VITALS
HEIGHT: 76 IN | HEART RATE: 61 BPM | DIASTOLIC BLOOD PRESSURE: 80 MMHG | SYSTOLIC BLOOD PRESSURE: 119 MMHG | WEIGHT: 281 LBS | BODY MASS INDEX: 34.22 KG/M2

## 2024-08-12 DIAGNOSIS — M25.561 CHRONIC PAIN OF RIGHT KNEE: ICD-10-CM

## 2024-08-12 DIAGNOSIS — G89.29 CHRONIC PAIN OF RIGHT KNEE: ICD-10-CM

## 2024-08-12 DIAGNOSIS — M17.11 PRIMARY OSTEOARTHRITIS OF RIGHT KNEE: Primary | ICD-10-CM

## 2024-08-12 DIAGNOSIS — M17.11 PRIMARY OSTEOARTHRITIS OF RIGHT KNEE: ICD-10-CM

## 2024-08-12 PROCEDURE — 73562 X-RAY EXAM OF KNEE 3: CPT | Performed by: ORTHOPAEDIC SURGERY

## 2024-08-12 RX ORDER — SOTALOL HYDROCHLORIDE 160 MG/1
160 TABLET ORAL 2 TIMES DAILY
COMMUNITY
Start: 2024-03-18

## 2024-08-12 RX ORDER — TRIAMCINOLONE ACETONIDE 40 MG/ML
40 INJECTION, SUSPENSION INTRA-ARTICULAR; INTRAMUSCULAR ONCE
Status: COMPLETED | OUTPATIENT
Start: 2024-08-12 | End: 2024-08-12

## 2024-08-12 RX ADMIN — TRIAMCINOLONE ACETONIDE 40 MG: 40 INJECTION, SUSPENSION INTRA-ARTICULAR; INTRAMUSCULAR at 11:54:00

## 2024-08-12 NOTE — PROGRESS NOTES
Per verbal order from Dr. Steinberg, draw up and 4ml of 0.5% Marcaine and 1ml of Kenalog 40 for injection into right knee.Victoria MATA MA  Patient provided education handout for cortisone injection.

## 2024-08-12 NOTE — PROGRESS NOTES
NURSING INTAKE COMMENTS:   Chief Complaint   Patient presents with    Knee Pain     F/u Right knee pain 4-9/10 worse after walking  for a mile or cutting the grass. On 3/27/2023 got cortisone injection it relieved pain for 2 months, and PT did not relieved pain       HPI: This 64 year old male presents today with complaints of right knee pain.  He is very pleased with the results of his left knee replacement.  He has trouble with activity.  When he plays golf he is very sore by the 15th hole, etc.  He takes apixaban, and NSAIDs are contraindicated.    Past Medical History:    Agatston coronary artery calcium score between 100 and 199    Arrhythmia    A_Fib intermittently    ASD (atrial septal defect) (HCC)    High cholesterol    Obesity due to excess calories    Paroxysmal atrial fibrillation (HCC)     Past Surgical History:   Procedure Laterality Date    Ablate arrhythmia add on  9/25/2023    Cholecystectomy      Colonoscopy N/A 1/25/2019    Procedure: COLONOSCOPY;  Surgeon: Miguel Angel Roche MD;  Location: [facility] ENDOSCOPY    Colonoscopy      Colonoscopy N/A 5/27/2022    Procedure: COLONOSCOPY;  Surgeon: Miguel Angel Roche MD;  Location: [facility] ENDOSCOPY    Ep cardioversion 1x  1/9/2018, April 2017         Ep cardioversion 1x  2/16/2022         Ep cardioversion 1x  11/16/2022         Laparoscopic cholecystectomy      Repr asd ostium premum  1990 approx    Stim/pacing heart post iv drug infu  9/25/2023    Tx atrial fib pulm vein isol  9/25/2023     Current Outpatient Medications   Medication Sig Dispense Refill    Sotalol HCl 160 MG Oral Tab Take 1 tablet (160 mg total) by mouth 2 (two) times daily.      metoprolol tartrate 50 MG Oral Tab Take 0.5 tablets (25 mg total) by mouth 2 (two) times daily. 180 tablet 3    apixaban (ELIQUIS) 5 MG Oral Tab Take 1 tablet (5 mg total) by mouth 2 (two) times daily.      rosuvastatin 10 MG Oral Tab Take 1 tablet (10 mg total) by mouth nightly. (Patient taking differently: Take 1 tablet  (10 mg total) by mouth daily.) 90 tablet 3     No Known Allergies  Family History   Problem Relation Age of Onset    Heart Disorder Mother 35        Heart attack    Cancer Father        Social History     Occupational History    Not on file   Tobacco Use    Smoking status: Never    Smokeless tobacco: Never   Vaping Use    Vaping status: Never Used   Substance and Sexual Activity    Alcohol use: Yes     Alcohol/week: 3.0 - 4.0 standard drinks of alcohol     Types: 2 Standard drinks or equivalent, 1 - 2 Cans of beer per week    Drug use: No    Sexual activity: Yes     Partners: Female        Review of Systems:  GENERAL: feels generally well, no significant weight loss or weight gain  SKIN: no ulcerated or worrisome skin lesions  EYES:denies blurred vision or double vision  HEENT: denies new nasal congestion, sinus pain or ST  LUNGS: denies shortness of breath  CARDIOVASCULAR: denies chest pain  GI: no hematemesis, no worsening heartburn, no diarrhea  : no dysuria, no blood in urine, no difficulty urinating, no incontinence  MUSCULOSKELETAL: no other musculoskeletal complaints other than in HPI  NEURO: no numbness or tingling, no weakness or balance disorder  PSYCHE: no depression or anxiety  HEMATOLOGIC: no hx of blood dyscrasia  ENDOCRINE: no thyroid or diabetes issues  ALL/ASTHMA: no new hx of severe allergy or asthma    Physical Examination:    /80   Pulse 61   Ht 6' 4\" (1.93 m)   Wt 281 lb (127.5 kg)   BMI 34.20 kg/m²   Constitutional: appears well hydrated, alert and responsive, no acute distress noted  Extremities: Antalgic gait.  Effusion of the right knee.  Full extension with 120 degrees of flexion.  No instability.      Imaging: Advanced osteoarthritis of the right knee with a well aligned left total knee arthroplasty.     Lab Results   Component Value Date    WBC 10.0 06/06/2024    HGB 17.6 (H) 06/06/2024    .0 06/06/2024      Lab Results   Component Value Date     (H) 06/06/2024     BUN 18 06/06/2024    CREATSERUM 1.07 06/06/2024    GFRNAA 85 02/14/2022    GFRAA 99 02/14/2022        Assessment and Plan:  Diagnoses and all orders for this visit:    Primary osteoarthritis of right knee  -     XR KNEE (3 VIEWS), RIGHT (CPT=73562); Future  -     arthrocentesis major joint  -     triamcinolone acetonide (Kenalog-40) 40 MG/ML injection 40 mg    Chronic pain of right knee  -     XR KNEE (3 VIEWS), RIGHT (CPT=73562); Future        Assessment: Osteoarthritis of the right knee.  Procedure: The risks and benefits of a cortisone injection were discussed with the patient.  An informational sheet was also provided and the patient had ample time to review it.  Under sterile preparation, the right knee was injected with 40 mg of Kenalog and 4 cc 0.5% marcaine.  The patient tolerated the procedure well.      Plan: He will follow-up with me as needed.  We talked about knee replacement.  He does not want to do further physical therapy.  He has done this in the past and feels comfortable doing the exercises on his own.  NSAIDs are contraindicated due to his concurrent use of apixaban.  We talked about the eventual need for right knee replacement as we had done on the left side.  The above note was creating using Dragon speech recognition technology. Please excuse any typos.    CHANTELL BAHENA MD

## 2024-09-26 ENCOUNTER — LAB ENCOUNTER (OUTPATIENT)
Dept: LAB | Age: 64
End: 2024-09-26
Attending: INTERNAL MEDICINE
Payer: COMMERCIAL

## 2024-09-26 DIAGNOSIS — Z79.899 LONG TERM CURRENT USE OF ANTIARRHYTHMIC DRUG: ICD-10-CM

## 2024-09-26 DIAGNOSIS — I48.19 ATRIAL FIBRILLATION, PERSISTENT (HCC): Primary | ICD-10-CM

## 2024-09-26 LAB
ALBUMIN SERPL-MCNC: 5 G/DL (ref 3.2–4.8)
ALBUMIN/GLOB SERPL: 1.9 {RATIO} (ref 1–2)
ALP LIVER SERPL-CCNC: 58 U/L
ALT SERPL-CCNC: 39 U/L
ANION GAP SERPL CALC-SCNC: 8 MMOL/L (ref 0–18)
AST SERPL-CCNC: 28 U/L (ref ?–34)
BASOPHILS # BLD AUTO: 0.07 X10(3) UL (ref 0–0.2)
BASOPHILS NFR BLD AUTO: 0.9 %
BILIRUB SERPL-MCNC: 1.1 MG/DL (ref 0.2–1.1)
BUN BLD-MCNC: 19 MG/DL (ref 9–23)
BUN/CREAT SERPL: 15.3 (ref 10–20)
CALCIUM BLD-MCNC: 9.8 MG/DL (ref 8.7–10.4)
CHLORIDE SERPL-SCNC: 107 MMOL/L (ref 98–112)
CO2 SERPL-SCNC: 26 MMOL/L (ref 21–32)
CREAT BLD-MCNC: 1.24 MG/DL
DEPRECATED RDW RBC AUTO: 43 FL (ref 35.1–46.3)
EGFRCR SERPLBLD CKD-EPI 2021: 65 ML/MIN/1.73M2 (ref 60–?)
EOSINOPHIL # BLD AUTO: 0.22 X10(3) UL (ref 0–0.7)
EOSINOPHIL NFR BLD AUTO: 2.8 %
ERYTHROCYTE [DISTWIDTH] IN BLOOD BY AUTOMATED COUNT: 13.2 % (ref 11–15)
FASTING STATUS PATIENT QL REPORTED: NO
GLOBULIN PLAS-MCNC: 2.7 G/DL (ref 2–3.5)
GLUCOSE BLD-MCNC: 120 MG/DL (ref 70–99)
HCT VFR BLD AUTO: 51.9 %
HGB BLD-MCNC: 17.7 G/DL
IMM GRANULOCYTES # BLD AUTO: 0.02 X10(3) UL (ref 0–1)
IMM GRANULOCYTES NFR BLD: 0.3 %
LYMPHOCYTES # BLD AUTO: 1.62 X10(3) UL (ref 1–4)
LYMPHOCYTES NFR BLD AUTO: 20.3 %
MCH RBC QN AUTO: 30.5 PG (ref 26–34)
MCHC RBC AUTO-ENTMCNC: 34.1 G/DL (ref 31–37)
MCV RBC AUTO: 89.3 FL
MONOCYTES # BLD AUTO: 0.59 X10(3) UL (ref 0.1–1)
MONOCYTES NFR BLD AUTO: 7.4 %
NEUTROPHILS # BLD AUTO: 5.45 X10 (3) UL (ref 1.5–7.7)
NEUTROPHILS # BLD AUTO: 5.45 X10(3) UL (ref 1.5–7.7)
NEUTROPHILS NFR BLD AUTO: 68.3 %
OSMOLALITY SERPL CALC.SUM OF ELEC: 295 MOSM/KG (ref 275–295)
PLATELET # BLD AUTO: 274 10(3)UL (ref 150–450)
POTASSIUM SERPL-SCNC: 5.5 MMOL/L (ref 3.5–5.1)
PROT SERPL-MCNC: 7.7 G/DL (ref 5.7–8.2)
RBC # BLD AUTO: 5.81 X10(6)UL
SODIUM SERPL-SCNC: 141 MMOL/L (ref 136–145)
WBC # BLD AUTO: 8 X10(3) UL (ref 4–11)

## 2024-09-26 PROCEDURE — 36415 COLL VENOUS BLD VENIPUNCTURE: CPT

## 2024-09-26 PROCEDURE — 85025 COMPLETE CBC W/AUTO DIFF WBC: CPT

## 2024-09-26 PROCEDURE — 80053 COMPREHEN METABOLIC PANEL: CPT

## 2024-09-26 NOTE — H&P
[facility]    Cardiac Electrophysiology H&P Addendum    Lee Sunshine Location:Cath Lab Suites   [facility] [id number] MRN [id number]   Admission Date 10/4/2024 Procedure Date 10/4/2024   Attending Physician Crystal Jones MD Procedure Physician Crystal Jones MD       H&P ADDENDUM    I personally reviewed the attached H&P on 10/4/2024, and no significant changes have occurred in the patient's condition since the H&P was performed.  Risks, alternatives, and benefits of procedure were again reviewed at bedside with the patient.  They have no additional questions and wish to proceed. Sedation assessment done immediately prior to the procedure: ASA 2, Mallampati II, acceptable for moderate sedation.    Crystal Jones M.D.   Cardiac Electrophysiology     10/4/2024  -----------------------------------------

## 2024-10-04 ENCOUNTER — HOSPITAL ENCOUNTER (OUTPATIENT)
Dept: INTERVENTIONAL RADIOLOGY/VASCULAR | Facility: HOSPITAL | Age: 64
Discharge: HOME | End: 2024-10-04
Attending: INTERNAL MEDICINE | Admitting: INTERNAL MEDICINE
Payer: COMMERCIAL

## 2024-10-04 VITALS
HEART RATE: 60 BPM | RESPIRATION RATE: 16 BRPM | TEMPERATURE: 97 F | HEIGHT: 76 IN | BODY MASS INDEX: 33.61 KG/M2 | WEIGHT: 276 LBS | DIASTOLIC BLOOD PRESSURE: 63 MMHG | SYSTOLIC BLOOD PRESSURE: 105 MMHG | OXYGEN SATURATION: 94 %

## 2024-10-04 DIAGNOSIS — I48.19 PERSISTENT ATRIAL FIBRILLATION (HCC): ICD-10-CM

## 2024-10-04 LAB
ATRIAL RATE: 62 BPM
P AXIS: 35 DEGREES
P-R INTERVAL: 156 MS
POTASSIUM SERPL-SCNC: 4.5 MMOL/L (ref 3.5–5.1)
Q-T INTERVAL: 472 MS
QRS DURATION: 104 MS
QTC CALCULATION (BEZET): 479 MS
R AXIS: 53 DEGREES
T AXIS: 42 DEGREES
VENTRICULAR RATE: 62 BPM

## 2024-10-04 PROCEDURE — 5A2204Z RESTORATION OF CARDIAC RHYTHM, SINGLE: ICD-10-PCS | Performed by: INTERNAL MEDICINE

## 2024-10-04 PROCEDURE — 36415 COLL VENOUS BLD VENIPUNCTURE: CPT

## 2024-10-04 PROCEDURE — 92960 CARDIOVERSION ELECTRIC EXT: CPT | Performed by: INTERNAL MEDICINE

## 2024-10-04 PROCEDURE — 93010 ELECTROCARDIOGRAM REPORT: CPT | Performed by: INTERNAL MEDICINE

## 2024-10-04 PROCEDURE — 84132 ASSAY OF SERUM POTASSIUM: CPT | Performed by: INTERNAL MEDICINE

## 2024-10-04 PROCEDURE — 93005 ELECTROCARDIOGRAM TRACING: CPT

## 2024-10-04 RX ORDER — METHOHEXITAL IN WATER/PF 100MG/10ML
SYRINGE (ML) INTRAVENOUS
Status: DISCONTINUED
Start: 2024-10-04 | End: 2024-10-04

## 2024-10-04 RX ORDER — SODIUM CHLORIDE 9 MG/ML
INJECTION, SOLUTION INTRAVENOUS CONTINUOUS
Status: DISCONTINUED | OUTPATIENT
Start: 2024-10-04 | End: 2024-10-04

## 2024-10-04 NOTE — PROCEDURES
[facility]    Cardiac Electrophysiology Procedure Note    Lee Sunshine Location:Cath Lab Suites   [facility] [id number] MRN [id number]   Admission Date 10/4/2024 Procedure Date 10/4/2024   Attending Physician Crystal Jones MD Procedure Physician Crystal Jones MD       Pre-Operative Diagnosis: Persistent atrial fibrillation    Post-Operative Diagnosis: Persistent atrial fibrillation    Procedure Performed:    1.    Direct current cardioversion    Anesthesia: I provided moderate conscious sedation from 09:30 to 09:45. Methohexital.     Complications: None apparent    Procedural findings: The patient was brought to the procedure suite in stable and postabsorptive state after providing informed consent.  A time out was performed to confirm the patient's identity and type and site of the procedure.  Sedation was administered.  The patient received a 200 Joule synchronized biphasic shock via anterior-posterior pads, which converted the patient from atrial fibrillation to sinus rhythm 75 bpm.  The patient was awakened and transferred to the recovery area in stable and comfortable condition.     RESULTS:  -Electrical cardioversion to sinus rhythm    PLAN:  1. Sedation recovery  2. ECG  3. Continue anticoagulation  4. Medication changes: None  5. Discharge after sedation recovery criteria met  6. Activity and driving restrictions x 24 hours  7. Follow-up with me as scheduled    Cyrstal Jones M.D.   Cardiac Electrophysiology     10/4/2024  -----------------------------------------

## 2024-10-04 NOTE — DISCHARGE INSTRUCTIONS
Discharge Instructions for Cardioversion  Your healthcare provider performed a procedure called cardioversion. Your healthcare provider used a controlled electric shock or a medicine to briefly stop all electrical activity in your heart. This helped restore your heart’s normal rhythm. Here are some instructions to follow while you recover.  Home care  Because cardioversion typically requires sedation, you won't be able to drive home. You will need a ride. Wait at least 24 hours before driving a car or operating heavy machinery after receiving sedating medicines.  Don’t be alarmed if the skin on your chest is irritated or feels like it is sunburned. Your healthcare provider may prescribe a soothing lotion to relieve this discomfort. These minor symptoms will go away in a few days.  Ask your healthcare provider about medicines to keep your heart rhythm steady.  If you were prescribed medicine, take it as instructed by your healthcare provider. Don’t skip doses or take double doses. Cardioversion requires blood thinners for at least 4 weeks to prevent a delayed risk of stroke when treating atrial fibrillation or atrial flutter. Be sure you discuss which medicine you are taking to prevent stroke. Ask when you need to have your medicine levels checked, and whether you may be able to stop taking it in the future or whether it is recommended that you take it for life. Some of these blood-thinning medicines will have the dose adjusted, and interact with other medicines or foods. Your healthcare team will give you full instructions on what to watch out for. Report bleeding or symptoms of stroke immediately to your healthcare team and seek emergency medical attention.  Learn to take your own pulse. Keep a record of your results. Ask your healthcare provider when you should seek emergency medical attention. He or she will tell you which pulse rate reading is dangerous.   Keep in mind this procedure may need to be repeated if  the abnormal heart rhythm returns. After the procedure, your healthcare provider will tell you if the treatment worked or if you will need further treatments or medication.  Follow-up care  Make a follow-up appointment, or as directed.  Call 911  Call 911 right away if you have:  Chest pain  Shortness of breath  Loss of vision, speech, or strength or coordination in any body part   When to call your healthcare provider  Call your healthcare provider right away if you:  Feel faint, dizzy, or lightheaded  Have chest pain with increased activity  Have irregular heartbeat or fast pulse  Have bleeding issues from blood-thinning medicines

## 2024-10-04 NOTE — IVS NOTE
Tolerated procedure well.  Tolerated PO juice.  12 lead ECG done.  Remains in NSR  Discharge and follow-up instructions given.  Discharged home per W/C VSS

## 2024-10-08 RX ORDER — SOTALOL HYDROCHLORIDE 160 MG/1
160 TABLET ORAL 2 TIMES DAILY
Refills: 0 | OUTPATIENT
Start: 2024-10-08

## 2024-10-15 RX ORDER — SOTALOL HYDROCHLORIDE 160 MG/1
160 TABLET ORAL 2 TIMES DAILY
Refills: 0 | OUTPATIENT
Start: 2024-10-15

## 2025-02-12 NOTE — TELEPHONE ENCOUNTER
Previously prescribed by Dr. Jones. Refill request rerouted to cardiology office.    Last Rx: under patient reported.    Requested Prescriptions   Pending Prescriptions Disp Refills    apixaban (ELIQUIS) 5 MG Oral Tab  0     Sig: Take 1 tablet (5 mg total) by mouth 2 (two) times daily.       There is no refill protocol information for this order

## 2025-02-14 ENCOUNTER — PATIENT MESSAGE (OUTPATIENT)
Dept: INTERNAL MEDICINE CLINIC | Facility: CLINIC | Age: 65
End: 2025-02-14

## 2025-02-17 NOTE — TELEPHONE ENCOUNTER
From  Monserrat Garcia RN To  Lee Sunshine Sent and Delivered  2/16/2025  8:57 AM   Last Read in MyChart  2/16/2025  9:43 AM by Lee Sunshine

## 2025-02-25 ENCOUNTER — LAB ENCOUNTER (OUTPATIENT)
Dept: LAB | Age: 65
End: 2025-02-25
Attending: NURSE PRACTITIONER
Payer: MEDICARE

## 2025-02-25 ENCOUNTER — TELEPHONE (OUTPATIENT)
Facility: CLINIC | Age: 65
End: 2025-02-25

## 2025-02-25 ENCOUNTER — OFFICE VISIT (OUTPATIENT)
Dept: INTERNAL MEDICINE CLINIC | Facility: CLINIC | Age: 65
End: 2025-02-25
Payer: MEDICARE

## 2025-02-25 VITALS
HEIGHT: 76 IN | DIASTOLIC BLOOD PRESSURE: 76 MMHG | RESPIRATION RATE: 16 BRPM | SYSTOLIC BLOOD PRESSURE: 132 MMHG | WEIGHT: 295 LBS | OXYGEN SATURATION: 93 % | BODY MASS INDEX: 35.92 KG/M2 | TEMPERATURE: 97 F

## 2025-02-25 DIAGNOSIS — M17.12 PRIMARY OSTEOARTHRITIS OF LEFT KNEE: ICD-10-CM

## 2025-02-25 DIAGNOSIS — Z12.5 PROSTATE CANCER SCREENING: ICD-10-CM

## 2025-02-25 DIAGNOSIS — Z86.0100 HISTORY OF COLON POLYPS: ICD-10-CM

## 2025-02-25 DIAGNOSIS — E78.2 MIXED HYPERLIPIDEMIA: ICD-10-CM

## 2025-02-25 DIAGNOSIS — Z00.00 INITIAL MEDICARE ANNUAL WELLNESS VISIT: Primary | ICD-10-CM

## 2025-02-25 DIAGNOSIS — Z79.01 LONG TERM CURRENT USE OF ANTICOAGULANT: ICD-10-CM

## 2025-02-25 DIAGNOSIS — Z12.11 COLON CANCER SCREENING: Primary | ICD-10-CM

## 2025-02-25 DIAGNOSIS — Z12.11 COLON CANCER SCREENING: ICD-10-CM

## 2025-02-25 DIAGNOSIS — R93.1 AGATSTON CORONARY ARTERY CALCIUM SCORE BETWEEN 100 AND 199: ICD-10-CM

## 2025-02-25 DIAGNOSIS — E66.812 CLASS 2 OBESITY DUE TO EXCESS CALORIES WITH BODY MASS INDEX (BMI) OF 35.0 TO 35.9 IN ADULT, UNSPECIFIED WHETHER SERIOUS COMORBIDITY PRESENT: ICD-10-CM

## 2025-02-25 DIAGNOSIS — E66.09 CLASS 2 OBESITY DUE TO EXCESS CALORIES WITH BODY MASS INDEX (BMI) OF 35.0 TO 35.9 IN ADULT, UNSPECIFIED WHETHER SERIOUS COMORBIDITY PRESENT: ICD-10-CM

## 2025-02-25 DIAGNOSIS — I48.0 PAROXYSMAL ATRIAL FIBRILLATION (HCC): ICD-10-CM

## 2025-02-25 DIAGNOSIS — Q21.10 ASD (ATRIAL SEPTAL DEFECT) (HCC): ICD-10-CM

## 2025-02-25 PROBLEM — Z47.89 ORTHOPEDIC AFTERCARE: Status: RESOLVED | Noted: 2020-09-23 | Resolved: 2025-02-25

## 2025-02-25 PROBLEM — E66.01 SEVERE OBESITY (BMI 35.0-39.9) WITH COMORBIDITY (HCC): Chronic | Status: RESOLVED | Noted: 2025-02-25 | Resolved: 2025-02-25

## 2025-02-25 PROBLEM — E66.01 SEVERE OBESITY (BMI 35.0-39.9) WITH COMORBIDITY (HCC): Chronic | Status: ACTIVE | Noted: 2025-02-25

## 2025-02-25 LAB
ALBUMIN SERPL-MCNC: 5 G/DL (ref 3.2–4.8)
ALBUMIN/GLOB SERPL: 2 {RATIO} (ref 1–2)
ALP LIVER SERPL-CCNC: 56 U/L
ALT SERPL-CCNC: 42 U/L
ANION GAP SERPL CALC-SCNC: 7 MMOL/L (ref 0–18)
AST SERPL-CCNC: 26 U/L (ref ?–34)
BASOPHILS # BLD AUTO: 0.06 X10(3) UL (ref 0–0.2)
BASOPHILS NFR BLD AUTO: 0.8 %
BILIRUB SERPL-MCNC: 0.9 MG/DL (ref 0.2–1.1)
BUN BLD-MCNC: 19 MG/DL (ref 9–23)
BUN/CREAT SERPL: 19.2 (ref 10–20)
CALCIUM BLD-MCNC: 9.5 MG/DL (ref 8.7–10.4)
CHLORIDE SERPL-SCNC: 103 MMOL/L (ref 98–112)
CHOLEST SERPL-MCNC: 142 MG/DL (ref ?–200)
CO2 SERPL-SCNC: 28 MMOL/L (ref 21–32)
COMPLEXED PSA SERPL-MCNC: 0.36 NG/ML (ref ?–4)
CREAT BLD-MCNC: 0.99 MG/DL
DEPRECATED RDW RBC AUTO: 41.9 FL (ref 35.1–46.3)
EGFRCR SERPLBLD CKD-EPI 2021: 85 ML/MIN/1.73M2 (ref 60–?)
EOSINOPHIL # BLD AUTO: 0.23 X10(3) UL (ref 0–0.7)
EOSINOPHIL NFR BLD AUTO: 3.2 %
ERYTHROCYTE [DISTWIDTH] IN BLOOD BY AUTOMATED COUNT: 12.8 % (ref 11–15)
FASTING PATIENT LIPID ANSWER: YES
FASTING STATUS PATIENT QL REPORTED: YES
GLOBULIN PLAS-MCNC: 2.5 G/DL (ref 2–3.5)
GLUCOSE BLD-MCNC: 116 MG/DL (ref 70–99)
HCT VFR BLD AUTO: 47.8 %
HDLC SERPL-MCNC: 38 MG/DL (ref 40–59)
HGB BLD-MCNC: 16 G/DL
IMM GRANULOCYTES # BLD AUTO: 0.02 X10(3) UL (ref 0–1)
IMM GRANULOCYTES NFR BLD: 0.3 %
LDLC SERPL CALC-MCNC: 80 MG/DL (ref ?–100)
LYMPHOCYTES # BLD AUTO: 1.18 X10(3) UL (ref 1–4)
LYMPHOCYTES NFR BLD AUTO: 16.3 %
MCH RBC QN AUTO: 29.7 PG (ref 26–34)
MCHC RBC AUTO-ENTMCNC: 33.5 G/DL (ref 31–37)
MCV RBC AUTO: 88.7 FL
MONOCYTES # BLD AUTO: 0.47 X10(3) UL (ref 0.1–1)
MONOCYTES NFR BLD AUTO: 6.5 %
NEUTROPHILS # BLD AUTO: 5.29 X10 (3) UL (ref 1.5–7.7)
NEUTROPHILS # BLD AUTO: 5.29 X10(3) UL (ref 1.5–7.7)
NEUTROPHILS NFR BLD AUTO: 72.9 %
NONHDLC SERPL-MCNC: 104 MG/DL (ref ?–130)
OSMOLALITY SERPL CALC.SUM OF ELEC: 289 MOSM/KG (ref 275–295)
PLATELET # BLD AUTO: 239 10(3)UL (ref 150–450)
POTASSIUM SERPL-SCNC: 4.8 MMOL/L (ref 3.5–5.1)
PROT SERPL-MCNC: 7.5 G/DL (ref 5.7–8.2)
RBC # BLD AUTO: 5.39 X10(6)UL
SODIUM SERPL-SCNC: 138 MMOL/L (ref 136–145)
TRIGL SERPL-MCNC: 138 MG/DL (ref 30–149)
TSI SER-ACNC: 1.75 UIU/ML (ref 0.55–4.78)
VLDLC SERPL CALC-MCNC: 22 MG/DL (ref 0–30)
WBC # BLD AUTO: 7.3 X10(3) UL (ref 4–11)

## 2025-02-25 PROCEDURE — 85025 COMPLETE CBC W/AUTO DIFF WBC: CPT

## 2025-02-25 PROCEDURE — 84443 ASSAY THYROID STIM HORMONE: CPT

## 2025-02-25 PROCEDURE — 36415 COLL VENOUS BLD VENIPUNCTURE: CPT

## 2025-02-25 PROCEDURE — 80053 COMPREHEN METABOLIC PANEL: CPT

## 2025-02-25 PROCEDURE — 80061 LIPID PANEL: CPT

## 2025-02-25 RX ORDER — ASPIRIN 81 MG/1
81 TABLET ORAL DAILY
COMMUNITY
Start: 2024-06-05 | End: 2025-02-25 | Stop reason: ALTCHOICE

## 2025-02-25 NOTE — PROGRESS NOTES
Subjective:   Lee Sunshine is a 64 year old male who presents for a MA AHA (Medicare Advantage Annual Health Assessment) and Medicare Initial Preventative Physical Exam (Welcome to Medicare- < 12 months on Medicare) and scheduled follow up of multiple significant but stable problems.     He presents for his first annual medicare physical. He has a history of AFIB. He is following with cardiology. He is currently taking eliquis, sotalol and metoprolol.      Followed up with cardiology 2/8/2025  Persistent atrial fibrillation  -S/p cardioversions in 2018, 2/2022, and 11/2022  -S/p laser PCI and RFA CTI, 9/25/23  -S/p cardioversion of atypical flutter, 10/2024  -ECG today: Sinus rhythm, QTc stable  -Continue sotalol 160 BID  -Continue Eliquis 5 mg BID  -Semiannual blood work to monitor Agatston coronary artery calcium score between 100 and 199  -BP and cholesterol are excellent  -Carotid screening in January 2020 was unremarkable  -Asymptomatic, ECG without concerning changes. No indication for stress testing currently.  -Continue aggressive risk factor controlMitral regurgitation  ASD (atrial septal defect)  -Exam stable  -Echocardiogram due in 6 months  Mixed hyperlipidemia  -Continue rosuvastatin  -Semiannual blood work to monitor     He does follow up with Dr Steinberg. He had left knee surgery. He is having right knee pain. He was told he needs surgery. He had a cortisone injection in August which helped.     He does have neuropathy in his feet. He was prescribed gabapentin. He does not want to take any more medication for his symptoms. He is using an electrical charge unit every day which has been helping his symptoms.     He declines pneumonia vaccine.     He needs to lose weight. He is not able to exercise as much as due to having right knee pain. He states he is eating healthy. No alcohol. He stopped drinking because he thought it was causing him to have AFIB. No tobacco use.     History/Other:   Fall Risk  Assessment:   He has been screened for Falls and is low risk.      Cognitive Assessment:   He had a completely normal cognitive assessment - see flowsheet entries       Functional Ability/Status:   Lee Sunshine has a completely normal functional assessment. See flowsheet for details.      Depression Screening (PHQ):  PHQ-2 SCORE: 0  , done 2/25/2025            Advanced Directives:   He has a Living Will on file in [facility]; reviewed and discussed documents with patient (and family/surrogate if present).  He does have a POA but we do NOT have it on file in Epic.    Discussed Advance Care Planning with patient (and family/surrogate if present). Standard forms made available to patient in After Visit Summary.      Patient Active Problem List   Diagnosis    Paroxysmal atrial fibrillation (HCC)    ASD (atrial septal defect) (HCC)    Obesity due to excess calories    Hyperlipidemia    Long term current use of anticoagulant    Agatston coronary artery calcium score between 100 and 199    Primary osteoarthritis of left knee     Allergies:  He has No Known Allergies.    Current Medications:  Outpatient Medications Marked as Taking for the 2/25/25 encounter (Office Visit) with Ritika Stark APRN   Medication Sig    Sotalol HCl 160 MG Oral Tab Take 1 tablet (160 mg total) by mouth 2 (two) times daily.    metoprolol tartrate 50 MG Oral Tab Take 0.5 tablets (25 mg total) by mouth 2 (two) times daily.    apixaban (ELIQUIS) 5 MG Oral Tab Take 1 tablet (5 mg total) by mouth 2 (two) times daily.    rosuvastatin 10 MG Oral Tab Take 1 tablet (10 mg total) by mouth nightly.       Medical History:  He  has a past medical history of Agatston coronary artery calcium score between 100 and 199 (08/20/2020), Arrhythmia, ASD (atrial septal defect) (HCC), Coronary atherosclerosis, High cholesterol, Obesity due to excess calories (12/21/2017), and Paroxysmal atrial fibrillation (HCC) (04/26/2017).  Surgical History:  He  has a past surgical  history that includes Laparoscopic cholecystectomy; ep cardioversion 1x (1/9/2018, April 2017); repr asd ostium premum (1990 approx); cholecystectomy; colonoscopy (N/A, 1/25/2019); colonoscopy; ep cardioversion 1x (2/16/2022); colonoscopy (N/A, 5/27/2022); ep cardioversion 1x (11/16/2022); stim/pacing heart post iv drug infu (9/25/2023); ablate arrhythmia add on (9/25/2023); tx atrial fib pulm vein isol (9/25/2023); and ep cardioversion 1x (10/4/2024).   Family History:  His family history includes Cancer in his father; Heart Disorder (age of onset: 35) in his mother.  Social History:  He  reports that he has never smoked. He has never used smokeless tobacco. He reports current alcohol use of about 3.0 - 4.0 standard drinks of alcohol per week. He reports that he does not use drugs.    Tobacco:  He has never smoked tobacco.    CAGE Alcohol Screen:   CAGE screening score of 0 on 2/25/2025, showing low risk of alcohol abuse.      Patient Care Team:  Etienne Christopher MD as PCP - General (Internal Medicine)  Omid Salcido MD as Neurologist (NEUROLOGY)  Roxy Moses MD (NEUROLOGY)  Fracisco Cotton DO as Consulting Physician (NEUROLOGY)    Review of Systems   Constitutional:  Negative for fever.   HENT: Negative.     Respiratory:  Negative for cough, shortness of breath and wheezing.    Cardiovascular:  Negative for chest pain.   Gastrointestinal:  Negative for abdominal pain.   Genitourinary: Negative.    Musculoskeletal:  Positive for arthralgias (right knee).   Skin: Negative.    Neurological: Negative.    Psychiatric/Behavioral: Negative.         Objective:   Physical Exam  Vitals reviewed.   Constitutional:       Appearance: Normal appearance.   HENT:      Head: Normocephalic.      Right Ear: Tympanic membrane normal.      Left Ear: Tympanic membrane normal.      Nose: No congestion.   Eyes:      Extraocular Movements: Extraocular movements intact.      Pupils: Pupils are equal, round, and reactive to  light.   Cardiovascular:      Rate and Rhythm: Normal rate and regular rhythm.      Pulses: Normal pulses.   Pulmonary:      Breath sounds: Normal breath sounds. No wheezing.   Abdominal:      General: Bowel sounds are normal.      Palpations: Abdomen is soft.      Tenderness: There is no abdominal tenderness.   Musculoskeletal:         General: No swelling. Normal range of motion.      Cervical back: Normal range of motion and neck supple.   Skin:     General: Skin is warm and dry.   Neurological:      Mental Status: He is alert and oriented to person, place, and time.   Psychiatric:         Mood and Affect: Mood normal.         Behavior: Behavior normal.       /76 (BP Location: Right arm, Patient Position: Sitting, Cuff Size: large)   Temp 97.3 °F (36.3 °C) (Temporal)   Resp 16   Ht 6' 4\" (1.93 m)   Wt 295 lb (133.8 kg)   SpO2 93%   BMI 35.91 kg/m²  Estimated body mass index is 35.91 kg/m² as calculated from the following:    Height as of this encounter: 6' 4\" (1.93 m).    Weight as of this encounter: 295 lb (133.8 kg).    Medicare Hearing Assessment:   Hearing Screening    Screening Method: Questionnaire  I have a problem hearing over the telephone: No I have trouble following the conversations when two or more people are talking at the same time: No   I have trouble understanding things on the TV: No I have to strain to understand conversations: No   I have to worry about missing the telephone ring or doorbell: No I have trouble hearing conversations in a noisy background such as a crowded room or restaurant: No   I get confused about where sounds come from: No I misunderstand some words in a sentence and need to ask people to repeat themselves: No   I especially have trouble understanding the speech of women and children: No I have trouble understanding the speaker in a large room such as at a meeting or place of [employer]: No   Many people I talk to seem to mumble (or don't speak clearly): No People  get annoyed because I misunderstand what they say: No   I misunderstand what others are saying and make inappropriate responses: No I avoid social activities because I cannot hear well and fear I will reply improperly: No   Family members and friends have told me they think I may have hearing loss: No             Visual Acuity:   Right Eye Visual Acuity: Uncorrected Right Eye Chart Acuity: 20/30   Left Eye Visual Acuity: Uncorrected Left Eye Chart Acuity: 20/30   Both Eyes Visual Acuity: Uncorrected Both Eyes Chart Acuity: 20/30   Able To Tolerate Visual Acuity: Yes        Assessment & Plan:   Lee Sunshine is a 64 year old male who presents for a Medicare Assessment.     1. Initial Medicare annual wellness visit (Primary)  2. Paroxysmal atrial fibrillation (HCC)  - following with cardiology   - continue eliquis, metoprolol and sotalol as prescribed   3. ASD (atrial septal defect) (HCC)  - stable   - following with cardiology   4. Class 2 obesity due to excess calories with body mass index (BMI) of 35.0 to 35.9 in adult, unspecified whether serious comorbidity present  - monitor diet   - discussed intermittent fasting   - exercise as tolerated   5. Mixed hyperlipidemia  - continue rosuvastatin as prescribed  -     Comp Metabolic Panel (14); Future; Expected date: 02/25/2025  -     CBC With Differential With Platelet; Future; Expected date: 02/25/2025  -     TSH W Reflex To Free T4; Future; Expected date: 02/25/2025  -     Lipid Panel; Future; Expected date: 02/25/2025  6. Agatston coronary artery calcium score between 100 and 199  - following with cardiology   7. Long term current use of anticoagulant  - continue eliquis as prescribed   8. Primary osteoarthritis of left knee  - resolved   - following with ortho   9. Colon cancer screening  - colonoscopy 5/27/2022  -     Gastro Referral - [facility] ([facility])  10. Prostate cancer screening  -     PSA Total, Screen; Future; Expected date: 02/25/2025    The  patient indicates understanding of these issues and agrees to the plan.  Lab work ordered.  Reinforced healthy diet, lifestyle, and exercise.      Return in about 6 months (around 8/25/2025).     IFTIKHAR Oliver, 2/25/2025     Supplementary Documentation:   General Health:  In the past six months, have you lost more than 10 pounds without trying?: 2 - No  Has your appetite been poor?: No  Type of Diet: Balanced  How does the patient maintain a good energy level?: Appropriate Exercise  How would you describe your daily physical activity?: Light  How would you describe your current health state?: Fair  How do you maintain positive mental well-being?: Social Interaction;Puzzles;Games;Visiting Friends;Visiting Family  On a scale of 0 to 10, with 0 being no pain and 10 being severe pain, what is your pain level?: 2 - (Mild)  In the past six months, have you experienced urine leakage?: 0-No  At any time do you feel concerned for the safety/well-being of yourself and/or your children, in your home or elsewhere?: No  Have you had any immunizations at another office such as Influenza, Hepatitis B, Tetanus, or Pneumococcal?: No    Health Maintenance   Topic Date Due    Pneumococcal Vaccine: 50+ Years (1 of 1 - PCV) Never done    Zoster Vaccines (1 of 2) Never done    PSA  04/10/2023    Annual Well Visit  Never done    Colorectal Cancer Screening  05/27/2025    COVID-19 Vaccine (1 - 2024-25 season) 03/25/2025 (Originally 9/1/2024)    Influenza Vaccine (1) 06/30/2025 (Originally 10/1/2024)    Annual Depression Screening  Completed    Fall Risk Screening (Annual)  Completed    Meningococcal B Vaccine  Aged Out

## 2025-02-26 NOTE — TELEPHONE ENCOUNTER
I faxed request for eliquis hold to [facility].  I also set up remind me encounter to follow up closer to date of procedure.

## 2025-02-26 NOTE — TELEPHONE ENCOUNTER
Colonoscopy for colon screening and hx colon polyps  Golytely   MAC     Hold eliquis 2 days before if ok with cardiology (hx afib)

## 2025-02-26 NOTE — TELEPHONE ENCOUNTER
Scheduled for:  Colonoscopy 60236  Provider Name:  Dr. Roche  Date:  6/19/2025  Location:   [facility]  Sedation:  MAC  Time:  2:05 PM - Patient is aware [facility] will call the day before with arrival time.  Prep:  Golytely  Meds/Allergies Reconciled?:  Physician reviewed   Diagnosis with codes:  Colon cancer screening Z12.11; Hx: Colon polyps Z86.010  Was patient informed to call insurance with codes (Y/N):  Yes, I confirmed MEDICARE ADVANTAGE [employer] PPO insurance with the patient.   Referral sent?:  Referral was sent at the time of electronic surgical scheduling.   EM or [facility] notified?:  I sent an electronic request to Endo Scheduling and received a confirmation today.   Medication Orders:  N/A  Misc Orders:  GI RN will obtain Eliquis orders from Dr. Jones prior to procedure.     Further instructions given by staff: I discussed the prep instructions with the patient which he verbally understood and is aware that I will send the instructions today.

## 2025-03-12 ENCOUNTER — HOSPITAL ENCOUNTER (OUTPATIENT)
Dept: GENERAL RADIOLOGY | Facility: HOSPITAL | Age: 65
Discharge: HOME | End: 2025-03-12
Attending: ORTHOPAEDIC SURGERY
Payer: MEDICARE

## 2025-03-12 ENCOUNTER — OFFICE VISIT (OUTPATIENT)
Dept: ORTHOPEDICS CLINIC | Facility: CLINIC | Age: 65
End: 2025-03-12
Payer: MEDICARE

## 2025-03-12 VITALS
HEIGHT: 76 IN | BODY MASS INDEX: 35.8 KG/M2 | WEIGHT: 294 LBS | HEART RATE: 61 BPM | SYSTOLIC BLOOD PRESSURE: 111 MMHG | DIASTOLIC BLOOD PRESSURE: 70 MMHG

## 2025-03-12 DIAGNOSIS — G89.29 CHRONIC PAIN OF RIGHT KNEE: ICD-10-CM

## 2025-03-12 DIAGNOSIS — M25.561 CHRONIC PAIN OF RIGHT KNEE: ICD-10-CM

## 2025-03-12 DIAGNOSIS — M17.11 PRIMARY OSTEOARTHRITIS OF RIGHT KNEE: Primary | ICD-10-CM

## 2025-03-12 PROCEDURE — 3074F SYST BP LT 130 MM HG: CPT | Performed by: ORTHOPAEDIC SURGERY

## 2025-03-12 PROCEDURE — 99214 OFFICE O/P EST MOD 30 MIN: CPT | Performed by: ORTHOPAEDIC SURGERY

## 2025-03-12 PROCEDURE — 73562 X-RAY EXAM OF KNEE 3: CPT | Performed by: ORTHOPAEDIC SURGERY

## 2025-03-12 PROCEDURE — 3078F DIAST BP <80 MM HG: CPT | Performed by: ORTHOPAEDIC SURGERY

## 2025-03-12 PROCEDURE — 3008F BODY MASS INDEX DOCD: CPT | Performed by: ORTHOPAEDIC SURGERY

## 2025-03-12 NOTE — PROGRESS NOTES
NURSING INTAKE COMMENTS:   Chief Complaint   Patient presents with    Knee Pain     Right f/u - had cortisone inj in 8/12/24  - pain restarted about October and is rated as 2-7/10 at all the time - always worse with walking - wants another cortisone in ection        HPI: This 64 year old male presents today with complaints of right knee pain.  He reports that the knee pain feels just like the left knee did before he had it replaced.  He has had several cortisone injections, used NSAIDs, and done therapeutic exercises without significant relief.    Past Medical History:    Agatston coronary artery calcium score between 100 and 199    Arrhythmia    A_Fib intermittently    ASD (atrial septal defect) (HCC)    Coronary atherosclerosis    High cholesterol    Obesity due to excess calories    Paroxysmal atrial fibrillation (HCC)     Past Surgical History:   Procedure Laterality Date    Ablate arrhythmia add on  9/25/2023    Cholecystectomy      Colonoscopy N/A 1/25/2019    Procedure: COLONOSCOPY;  Surgeon: Miguel Angel Roche MD;  Location: [facility] ENDOSCOPY    Colonoscopy      Colonoscopy N/A 5/27/2022    Procedure: COLONOSCOPY;  Surgeon: Miguel Angel Roche MD;  Location: [facility] ENDOSCOPY    Ep cardioversion 1x  1/9/2018, April 2017         Ep cardioversion 1x  2/16/2022         Ep cardioversion 1x  11/16/2022         Ep cardioversion 1x  10/4/2024    Laparoscopic cholecystectomy      Repr asd ostium premum  1990 approx    Stim/pacing heart post iv drug infu  9/25/2023    Tx atrial fib pulm vein isol  9/25/2023     Current Outpatient Medications   Medication Sig Dispense Refill    Sotalol HCl 160 MG Oral Tab Take 1 tablet (160 mg total) by mouth 2 (two) times daily.      metoprolol tartrate 50 MG Oral Tab Take 0.5 tablets (25 mg total) by mouth 2 (two) times daily. 180 tablet 3    apixaban (ELIQUIS) 5 MG Oral Tab Take 1 tablet (5 mg total) by mouth 2 (two) times daily.      rosuvastatin 10 MG Oral Tab Take 1 tablet (10 mg total) by  mouth nightly. 90 tablet 3     Allergies[1]  Family History   Problem Relation Age of Onset    Heart Disorder Mother 35        Heart attack    Cancer Father        Social History     Occupational History    Not on file   Tobacco Use    Smoking status: Never    Smokeless tobacco: Never   Vaping Use    Vaping status: Never Used   Substance and Sexual Activity    Alcohol use: Yes     Alcohol/week: 3.0 - 4.0 standard drinks of alcohol     Types: 2 Standard drinks or equivalent, 1 - 2 Cans of beer per week    Drug use: No    Sexual activity: Yes     Partners: Female        Review of Systems:  GENERAL: feels generally well, no significant weight loss or weight gain  SKIN: no ulcerated or worrisome skin lesions  EYES:denies blurred vision or double vision  HEENT: denies new nasal congestion, sinus pain or ST  LUNGS: denies shortness of breath  CARDIOVASCULAR: denies chest pain  GI: no hematemesis, no worsening heartburn, no diarrhea  : no dysuria, no blood in urine, no difficulty urinating, no incontinence  MUSCULOSKELETAL: no other musculoskeletal complaints other than in HPI  NEURO: no numbness or tingling, no weakness or balance disorder  PSYCHE: no depression or anxiety  HEMATOLOGIC: no hx of blood dyscrasia  ENDOCRINE: no thyroid or diabetes issues  ALL/ASTHMA: no new hx of severe allergy or asthma    Physical Examination:    /70   Pulse 61   Ht 6' 4\" (1.93 m)   Wt 294 lb (133.4 kg)   BMI 35.79 kg/m²   Constitutional: appears well hydrated, alert and responsive, no acute distress noted  Extremities: He has an antalgic gait.  Mild varus deformity of the right knee.  10 degree flexion traction with further flexion 120 degrees.  Crepitus with motion.  No instability.  Neurological: Active plantarflexion dorsiflexion of feet.  Pulses: 2+ posterior tibial pulse.    Imaging: Advanced osteoarthritis of the right knee with complete loss of medial joint space, subchondral sclerosis and osteophyte formation.  There  is patellofemoral stress as well..     Lab Results   Component Value Date    WBC 7.3 02/25/2025    HGB 16.0 02/25/2025    .0 02/25/2025      Lab Results   Component Value Date     (H) 02/25/2025    BUN 19 02/25/2025    CREATSERUM 0.99 02/25/2025    GFRNAA 85 02/14/2022    GFRAA 99 02/14/2022        Assessment and Plan:  Diagnoses and all orders for this visit:    Primary osteoarthritis of right knee    Other orders  -     XR KNEE (3 VIEWS), RIGHT (CPT=73562); Future        Assessment:He has advanced osteoarthritis of the knee that has not responded to conservative management.  I discussed right total knee replacement.  We discussed the risks and indications for surgery as well as the common possible complications.  Our discussion included, but was not limited to the potential risks of anesthetic complication, infection, bleeding, DVT or PE, nerve or blood vessel injury, stiffness, the potential need for revision surgery, leg length inequality, as well as the potential risk of unanticipated perioperative medical or orthopedic complications. He would like to proceed.  Surgery has been scheduled pending medical clearance.      Plan: He will call Chika at the orthopedic specialist office to schedule the procedure.    The above note was creating using Dragon speech recognition technology. Please excuse any typos.    CHANTELL BAHENA MD       [1] No Known Allergies

## 2025-03-28 NOTE — ANESTHESIA PROCEDURE NOTES
Airway  Date/Time: 9/25/2023 9:27 AM  Urgency: Elective    Airway not difficult    General Information and Staff    Patient location during procedure: OR  Anesthesiologist: Ranulfo Walker MD  Performed: anesthesiologist   Performed by: Ranulfo Walker MD  Authorized by: Ranulfo Walker MD      Indications and Patient Condition  Indications for airway management: anesthesia  Spontaneous ventilation: present  Sedation level: deep  Preoxygenated: yes  Patient position: sniffing  Mask difficulty assessment: 1 - vent by mask    Final Airway Details  Final airway type: endotracheal airway      Successful airway: ETT  Cuffed: yes   Successful intubation technique: direct laryngoscopy  Endotracheal tube insertion site: oral  Blade: GlideScope  ETT size (mm): 7.5    Placement verified by: capnometry   Measured from: teeth  ETT to teeth (cm): 25  Number of attempts at approach: 1
no

## 2025-04-03 ENCOUNTER — LAB ENCOUNTER (OUTPATIENT)
Dept: LAB | Age: 65
End: 2025-04-03
Attending: INTERNAL MEDICINE
Payer: MEDICARE

## 2025-04-03 DIAGNOSIS — Z01.810 PRE-OPERATIVE CARDIOVASCULAR EXAMINATION: Primary | ICD-10-CM

## 2025-04-03 LAB
ALBUMIN SERPL-MCNC: 4.8 G/DL (ref 3.2–4.8)
ALBUMIN/GLOB SERPL: 2 {RATIO} (ref 1–2)
ALP LIVER SERPL-CCNC: 53 U/L
ALT SERPL-CCNC: 38 U/L
ANION GAP SERPL CALC-SCNC: 7 MMOL/L (ref 0–18)
AST SERPL-CCNC: 26 U/L (ref ?–34)
BASOPHILS # BLD AUTO: 0.08 X10(3) UL (ref 0–0.2)
BASOPHILS NFR BLD AUTO: 1.1 %
BILIRUB SERPL-MCNC: 0.9 MG/DL (ref 0.2–1.1)
BUN BLD-MCNC: 17 MG/DL (ref 9–23)
BUN/CREAT SERPL: 15.5 (ref 10–20)
CALCIUM BLD-MCNC: 9.6 MG/DL (ref 8.7–10.4)
CHLORIDE SERPL-SCNC: 105 MMOL/L (ref 98–112)
CO2 SERPL-SCNC: 28 MMOL/L (ref 21–32)
CREAT BLD-MCNC: 1.1 MG/DL
DEPRECATED RDW RBC AUTO: 40.6 FL (ref 35.1–46.3)
EGFRCR SERPLBLD CKD-EPI 2021: 74 ML/MIN/1.73M2 (ref 60–?)
EOSINOPHIL # BLD AUTO: 0.17 X10(3) UL (ref 0–0.7)
EOSINOPHIL NFR BLD AUTO: 2.3 %
ERYTHROCYTE [DISTWIDTH] IN BLOOD BY AUTOMATED COUNT: 12.5 % (ref 11–15)
FASTING STATUS PATIENT QL REPORTED: YES
GLOBULIN PLAS-MCNC: 2.4 G/DL (ref 2–3.5)
GLUCOSE BLD-MCNC: 113 MG/DL (ref 70–99)
HCT VFR BLD AUTO: 48.4 %
HGB BLD-MCNC: 16.9 G/DL
IMM GRANULOCYTES # BLD AUTO: 0.02 X10(3) UL (ref 0–1)
IMM GRANULOCYTES NFR BLD: 0.3 %
LYMPHOCYTES # BLD AUTO: 1.64 X10(3) UL (ref 1–4)
LYMPHOCYTES NFR BLD AUTO: 22.6 %
MCH RBC QN AUTO: 30.8 PG (ref 26–34)
MCHC RBC AUTO-ENTMCNC: 34.9 G/DL (ref 31–37)
MCV RBC AUTO: 88.2 FL
MONOCYTES # BLD AUTO: 0.55 X10(3) UL (ref 0.1–1)
MONOCYTES NFR BLD AUTO: 7.6 %
NEUTROPHILS # BLD AUTO: 4.81 X10 (3) UL (ref 1.5–7.7)
NEUTROPHILS # BLD AUTO: 4.81 X10(3) UL (ref 1.5–7.7)
NEUTROPHILS NFR BLD AUTO: 66.1 %
OSMOLALITY SERPL CALC.SUM OF ELEC: 292 MOSM/KG (ref 275–295)
PLATELET # BLD AUTO: 267 10(3)UL (ref 150–450)
POTASSIUM SERPL-SCNC: 5.5 MMOL/L (ref 3.5–5.1)
PROT SERPL-MCNC: 7.2 G/DL (ref 5.7–8.2)
RBC # BLD AUTO: 5.49 X10(6)UL
SODIUM SERPL-SCNC: 140 MMOL/L (ref 136–145)
WBC # BLD AUTO: 7.3 X10(3) UL (ref 4–11)

## 2025-04-03 PROCEDURE — 36415 COLL VENOUS BLD VENIPUNCTURE: CPT

## 2025-04-03 PROCEDURE — 85025 COMPLETE CBC W/AUTO DIFF WBC: CPT

## 2025-04-03 PROCEDURE — 80053 COMPREHEN METABOLIC PANEL: CPT

## 2025-04-03 NOTE — H&P
[facility]    Cardiac Electrophysiology H&P Addendum    Lee Sunshine Location:Cath Lab Suites   [facility] [id number] MRN [id number]   Admission Date 4/9/2025 Procedure Date 4/9/2025   Attending Physician Crystal Jones MD Procedure Physician Crystal Jones MD       H&P ADDENDUM    I personally reviewed the attached H&P on 4/9/2025, and no significant changes have occurred in the patient's condition since the H&P was performed.  Risks, alternatives, and benefits of procedure were reviewed and the patient wishes to proceed. Sedation assessment done immediately prior to the procedure: ASA 2, Mallampati II, acceptable for moderate sedation.    Crystal Jones M.D.   Cardiac Electrophysiology     4/9/2025  -----------------------------------------

## 2025-04-09 ENCOUNTER — HOSPITAL ENCOUNTER (OUTPATIENT)
Dept: INTERVENTIONAL RADIOLOGY/VASCULAR | Facility: HOSPITAL | Age: 65
Discharge: HOME | End: 2025-04-09
Attending: INTERNAL MEDICINE | Admitting: INTERNAL MEDICINE
Payer: MEDICARE

## 2025-04-09 VITALS
DIASTOLIC BLOOD PRESSURE: 67 MMHG | RESPIRATION RATE: 16 BRPM | HEIGHT: 76 IN | TEMPERATURE: 99 F | BODY MASS INDEX: 35.44 KG/M2 | SYSTOLIC BLOOD PRESSURE: 94 MMHG | WEIGHT: 291 LBS | OXYGEN SATURATION: 98 % | HEART RATE: 59 BPM

## 2025-04-09 DIAGNOSIS — I48.19 ATRIAL FIBRILLATION, PERSISTENT (HCC): ICD-10-CM

## 2025-04-09 LAB
ANION GAP SERPL CALC-SCNC: 10 MMOL/L (ref 0–18)
ATRIAL RATE: 62 BPM
BUN BLD-MCNC: 16 MG/DL (ref 9–23)
BUN/CREAT SERPL: 15.8 (ref 10–20)
CALCIUM BLD-MCNC: 9.2 MG/DL (ref 8.7–10.4)
CHLORIDE SERPL-SCNC: 106 MMOL/L (ref 98–112)
CO2 SERPL-SCNC: 22 MMOL/L (ref 21–32)
CREAT BLD-MCNC: 1.01 MG/DL (ref 0.7–1.3)
EGFRCR SERPLBLD CKD-EPI 2021: 83 ML/MIN/1.73M2 (ref 60–?)
FASTING STATUS PATIENT QL REPORTED: YES
GLUCOSE BLD-MCNC: 120 MG/DL (ref 70–99)
OSMOLALITY SERPL CALC.SUM OF ELEC: 288 MOSM/KG (ref 275–295)
P AXIS: 50 DEGREES
P-R INTERVAL: 186 MS
POTASSIUM SERPL-SCNC: 4.3 MMOL/L (ref 3.5–5.1)
Q-T INTERVAL: 478 MS
QRS DURATION: 102 MS
QTC CALCULATION (BEZET): 485 MS
R AXIS: 47 DEGREES
SODIUM SERPL-SCNC: 138 MMOL/L (ref 136–145)
T AXIS: 29 DEGREES
VENTRICULAR RATE: 62 BPM

## 2025-04-09 PROCEDURE — 92960 CARDIOVERSION ELECTRIC EXT: CPT | Performed by: INTERNAL MEDICINE

## 2025-04-09 PROCEDURE — 80048 BASIC METABOLIC PNL TOTAL CA: CPT | Performed by: INTERNAL MEDICINE

## 2025-04-09 PROCEDURE — 93005 ELECTROCARDIOGRAM TRACING: CPT

## 2025-04-09 PROCEDURE — 5A2204Z RESTORATION OF CARDIAC RHYTHM, SINGLE: ICD-10-PCS | Performed by: INTERNAL MEDICINE

## 2025-04-09 PROCEDURE — 93010 ELECTROCARDIOGRAM REPORT: CPT | Performed by: INTERNAL MEDICINE

## 2025-04-09 RX ORDER — METHOHEXITAL IN WATER/PF 100MG/10ML
40 SYRINGE (ML) INTRAVENOUS ONCE
Status: COMPLETED | OUTPATIENT
Start: 2025-04-09 | End: 2025-04-09

## 2025-04-09 RX ORDER — SODIUM CHLORIDE 9 MG/ML
INJECTION, SOLUTION INTRAVENOUS CONTINUOUS
Status: DISCONTINUED | OUTPATIENT
Start: 2025-04-09 | End: 2025-04-09

## 2025-04-09 RX ORDER — METHOHEXITAL IN WATER/PF 100MG/10ML
SYRINGE (ML) INTRAVENOUS
Status: COMPLETED
Start: 2025-04-09 | End: 2025-04-09

## 2025-04-09 RX ADMIN — METHOHEXITAL IN WATER/PF 40 MG: 100MG/10ML SYRINGE (ML) INTRAVENOUS at 08:13:00

## 2025-04-09 RX ADMIN — SODIUM CHLORIDE: 9 INJECTION, SOLUTION INTRAVENOUS at 07:00:00

## 2025-04-09 NOTE — IVS NOTE
DISCHARGE NOTE     Pt is able to sit up and ambulate without difficulty.     Procedural site remains dry and intact with good circulation, motion and sensation.   No signs or symptoms of bleeding/hematoma noted.   Pt denies any pain or discomfort at this time.  IV access removed  Instructions provided, patient/family verbalizes understanding.   Dr. Jones spoke with patient/family post procedure.     Pt discharge via wheelchair to Main Lobby with wife  Pt discharge via stretcher to Nursing Facility     Follow up Appointment: 5/8    New Prescription: none

## 2025-04-09 NOTE — PROCEDURES
[facility]    Cardiac Electrophysiology Procedure Note    Lee Sunshine Location:Cath Lab Suites   [facility] [id number] MRN [id number]   Admission Date 4/9/2025 Procedure Date 4/9/2025   Attending Physician Crystal Jones MD Procedure Physician Crystal Jones MD       Pre-Operative Diagnosis: Persistent atrial fibrillation    Post-Operative Diagnosis: Persistent atrial fibrillation    Procedure Performed:    1.    Direct current cardioversion    Anesthesia: I provided moderate conscious sedation from 08:10 to 08:25.  Methohexital.     Complications: None apparent    Procedural findings: The patient was brought to the procedure suite in stable and postabsorptive state after providing informed consent.  A time out was performed to confirm the patient's identity and type and site of the procedure.  Sedation was administered.  The patient received a 200 Joule synchronized biphasic shock via anterior-posterior pads, which converted the patient from atrial fibrillation to sinus rhythm 70 bpm.  The patient was awakened and transferred to the recovery area in stable and comfortable condition.     RESULTS:  -Electrical cardioversion to sinus rhythm    PLAN:  1. Sedation recovery  2. ECG  3. Continue anticoagulation  4. Medication changes: None  5. Discharge after sedation recovery criteria met  6. Activity and driving restrictions x 24 hours  7. Follow-up with me as scheduled    Crystal Jones M.D.   Cardiac Electrophysiology     4/9/2025  -----------------------------------------

## 2025-04-17 ENCOUNTER — TELEPHONE (OUTPATIENT)
Dept: INTERNAL MEDICINE CLINIC | Facility: CLINIC | Age: 65
End: 2025-04-17

## 2025-04-17 NOTE — TELEPHONE ENCOUNTER
Patient is requesting a pre op clearance appointment with Dr. Christopher week of 4/21/25   patient is scheduled for surgery on 5/6/25    Provider's first available appointment is on 7/9/25    Is Dr. Christopher able to see patient in date frame requested.

## 2025-04-18 NOTE — TELEPHONE ENCOUNTER
Unfortunately I have no openings for next week.  Okay to have the patient see Ritika Stark for preoperative clearance.

## 2025-04-23 ENCOUNTER — PATIENT MESSAGE (OUTPATIENT)
Dept: INTERNAL MEDICINE CLINIC | Facility: CLINIC | Age: 65
End: 2025-04-23

## 2025-04-23 ENCOUNTER — HOSPITAL ENCOUNTER (OUTPATIENT)
Dept: GENERAL RADIOLOGY | Age: 65
Discharge: HOME | End: 2025-04-23
Attending: NURSE PRACTITIONER
Payer: MEDICARE

## 2025-04-23 ENCOUNTER — OFFICE VISIT (OUTPATIENT)
Dept: INTERNAL MEDICINE CLINIC | Facility: CLINIC | Age: 65
End: 2025-04-23
Payer: MEDICARE

## 2025-04-23 ENCOUNTER — LAB ENCOUNTER (OUTPATIENT)
Dept: LAB | Age: 65
End: 2025-04-23
Attending: NURSE PRACTITIONER
Payer: MEDICARE

## 2025-04-23 VITALS
HEART RATE: 70 BPM | BODY MASS INDEX: 35.72 KG/M2 | OXYGEN SATURATION: 97 % | HEIGHT: 76 IN | WEIGHT: 293.38 LBS | TEMPERATURE: 98 F | SYSTOLIC BLOOD PRESSURE: 133 MMHG | DIASTOLIC BLOOD PRESSURE: 75 MMHG

## 2025-04-23 DIAGNOSIS — I48.0 PAROXYSMAL ATRIAL FIBRILLATION (HCC): ICD-10-CM

## 2025-04-23 DIAGNOSIS — Z01.818 PREOPERATIVE CLEARANCE: Primary | ICD-10-CM

## 2025-04-23 DIAGNOSIS — Z01.818 PREOPERATIVE CLEARANCE: ICD-10-CM

## 2025-04-23 DIAGNOSIS — Q21.10 ASD (ATRIAL SEPTAL DEFECT) (HCC): ICD-10-CM

## 2025-04-23 DIAGNOSIS — E78.2 MIXED HYPERLIPIDEMIA: ICD-10-CM

## 2025-04-23 DIAGNOSIS — E66.812 CLASS 2 OBESITY DUE TO EXCESS CALORIES WITH BODY MASS INDEX (BMI) OF 35.0 TO 35.9 IN ADULT, UNSPECIFIED WHETHER SERIOUS COMORBIDITY PRESENT: ICD-10-CM

## 2025-04-23 DIAGNOSIS — Z79.01 LONG TERM CURRENT USE OF ANTICOAGULANT: ICD-10-CM

## 2025-04-23 DIAGNOSIS — E66.09 CLASS 2 OBESITY DUE TO EXCESS CALORIES WITH BODY MASS INDEX (BMI) OF 35.0 TO 35.9 IN ADULT, UNSPECIFIED WHETHER SERIOUS COMORBIDITY PRESENT: ICD-10-CM

## 2025-04-23 LAB
ALBUMIN SERPL-MCNC: 5 G/DL (ref 3.2–4.8)
ALBUMIN/GLOB SERPL: 2 {RATIO} (ref 1–2)
ALP LIVER SERPL-CCNC: 56 U/L (ref 45–117)
ALT SERPL-CCNC: 49 U/L (ref 10–49)
ANION GAP SERPL CALC-SCNC: 7 MMOL/L (ref 0–18)
AST SERPL-CCNC: 33 U/L (ref ?–34)
BASOPHILS # BLD AUTO: 0.06 X10(3) UL (ref 0–0.2)
BASOPHILS NFR BLD AUTO: 0.9 %
BILIRUB SERPL-MCNC: 1 MG/DL (ref 0.2–1.1)
BILIRUB UR QL: NEGATIVE
BUN BLD-MCNC: 13 MG/DL (ref 9–23)
BUN/CREAT SERPL: 14.1 (ref 10–20)
CALCIUM BLD-MCNC: 9.7 MG/DL (ref 8.7–10.4)
CHLORIDE SERPL-SCNC: 105 MMOL/L (ref 98–112)
CLARITY UR: CLEAR
CO2 SERPL-SCNC: 26 MMOL/L (ref 21–32)
CREAT BLD-MCNC: 0.92 MG/DL (ref 0.7–1.3)
DEPRECATED RDW RBC AUTO: 39.2 FL (ref 35.1–46.3)
EGFRCR SERPLBLD CKD-EPI 2021: 92 ML/MIN/1.73M2 (ref 60–?)
EOSINOPHIL # BLD AUTO: 0.27 X10(3) UL (ref 0–0.7)
EOSINOPHIL NFR BLD AUTO: 4 %
ERYTHROCYTE [DISTWIDTH] IN BLOOD BY AUTOMATED COUNT: 12.5 % (ref 11–15)
EST. AVERAGE GLUCOSE BLD GHB EST-MCNC: 128 MG/DL (ref 68–126)
FASTING STATUS PATIENT QL REPORTED: YES
GLOBULIN PLAS-MCNC: 2.5 G/DL (ref 2–3.5)
GLUCOSE BLD-MCNC: 113 MG/DL (ref 70–99)
GLUCOSE UR-MCNC: NORMAL MG/DL
HBA1C MFR BLD: 6.1 % (ref ?–5.7)
HCT VFR BLD AUTO: 47.7 % (ref 39–53)
HGB BLD-MCNC: 16.5 G/DL (ref 13–17.5)
HGB UR QL STRIP.AUTO: NEGATIVE
IMM GRANULOCYTES # BLD AUTO: 0.01 X10(3) UL (ref 0–1)
IMM GRANULOCYTES NFR BLD: 0.1 %
KETONES UR-MCNC: NEGATIVE MG/DL
LEUKOCYTE ESTERASE UR QL STRIP.AUTO: NEGATIVE
LYMPHOCYTES # BLD AUTO: 1.34 X10(3) UL (ref 1–4)
LYMPHOCYTES NFR BLD AUTO: 19.7 %
MCH RBC QN AUTO: 29.5 PG (ref 26–34)
MCHC RBC AUTO-ENTMCNC: 34.6 G/DL (ref 31–37)
MCV RBC AUTO: 85.3 FL (ref 80–100)
MONOCYTES # BLD AUTO: 0.46 X10(3) UL (ref 0.1–1)
MONOCYTES NFR BLD AUTO: 6.8 %
NEUTROPHILS # BLD AUTO: 4.66 X10 (3) UL (ref 1.5–7.7)
NEUTROPHILS # BLD AUTO: 4.66 X10(3) UL (ref 1.5–7.7)
NEUTROPHILS NFR BLD AUTO: 68.5 %
NITRITE UR QL STRIP.AUTO: NEGATIVE
OSMOLALITY SERPL CALC.SUM OF ELEC: 287 MOSM/KG (ref 275–295)
PH UR: 6 [PH] (ref 5–8)
PLATELET # BLD AUTO: 234 10(3)UL (ref 150–450)
POTASSIUM SERPL-SCNC: 4.3 MMOL/L (ref 3.5–5.1)
PROT SERPL-MCNC: 7.5 G/DL (ref 5.7–8.2)
PROT UR-MCNC: NEGATIVE MG/DL
RBC # BLD AUTO: 5.59 X10(6)UL (ref 3.8–5.8)
SODIUM SERPL-SCNC: 138 MMOL/L (ref 136–145)
SP GR UR STRIP: 1.02 (ref 1–1.03)
UROBILINOGEN UR STRIP-ACNC: NORMAL
WBC # BLD AUTO: 6.8 X10(3) UL (ref 4–11)

## 2025-04-23 PROCEDURE — 81003 URINALYSIS AUTO W/O SCOPE: CPT

## 2025-04-23 PROCEDURE — 36415 COLL VENOUS BLD VENIPUNCTURE: CPT

## 2025-04-23 PROCEDURE — 3008F BODY MASS INDEX DOCD: CPT | Performed by: NURSE PRACTITIONER

## 2025-04-23 PROCEDURE — 80053 COMPREHEN METABOLIC PANEL: CPT

## 2025-04-23 PROCEDURE — 3075F SYST BP GE 130 - 139MM HG: CPT | Performed by: NURSE PRACTITIONER

## 2025-04-23 PROCEDURE — 85025 COMPLETE CBC W/AUTO DIFF WBC: CPT

## 2025-04-23 PROCEDURE — 99214 OFFICE O/P EST MOD 30 MIN: CPT | Performed by: NURSE PRACTITIONER

## 2025-04-23 PROCEDURE — 1160F RVW MEDS BY RX/DR IN RCRD: CPT | Performed by: NURSE PRACTITIONER

## 2025-04-23 PROCEDURE — 71046 X-RAY EXAM CHEST 2 VIEWS: CPT | Performed by: NURSE PRACTITIONER

## 2025-04-23 PROCEDURE — 3078F DIAST BP <80 MM HG: CPT | Performed by: NURSE PRACTITIONER

## 2025-04-23 PROCEDURE — 1159F MED LIST DOCD IN RCRD: CPT | Performed by: NURSE PRACTITIONER

## 2025-04-23 PROCEDURE — 83036 HEMOGLOBIN GLYCOSYLATED A1C: CPT

## 2025-04-23 NOTE — PATIENT INSTRUCTIONS
Check with cardiology regarding stopping  your eliquis prior to surgery.     Do not take ibuprofen or motrin 7 days prior to surgery. You can take tylenol as needed for pain.     Do not take any supplements 7 days prior to surgery.

## 2025-04-25 ENCOUNTER — LAB ENCOUNTER (OUTPATIENT)
Dept: LAB | Age: 65
End: 2025-04-25
Attending: NURSE PRACTITIONER
Payer: MEDICARE

## 2025-04-25 DIAGNOSIS — Z01.818 PREOPERATIVE CLEARANCE: ICD-10-CM

## 2025-04-25 LAB
APTT PPP: 31.5 SECONDS (ref 23–36)
INR BLD: 1 (ref 0.8–1.2)
MRSA NASAL: NEGATIVE
PROTHROMBIN TIME: 13.8 SECONDS (ref 11.6–14.8)
STAPH A BY PCR: NEGATIVE

## 2025-04-25 PROCEDURE — 85730 THROMBOPLASTIN TIME PARTIAL: CPT

## 2025-04-25 PROCEDURE — 85610 PROTHROMBIN TIME: CPT

## 2025-04-25 PROCEDURE — 36415 COLL VENOUS BLD VENIPUNCTURE: CPT

## 2025-04-25 PROCEDURE — 87641 MR-STAPH DNA AMP PROBE: CPT

## 2025-04-25 PROCEDURE — 87640 STAPH A DNA AMP PROBE: CPT

## 2025-05-12 ENCOUNTER — HOSPITAL ENCOUNTER (OUTPATIENT)
Dept: CT IMAGING | Facility: HOSPITAL | Age: 65
Discharge: HOME | End: 2025-05-12
Attending: INTERNAL MEDICINE
Payer: MEDICARE

## 2025-05-12 VITALS
WEIGHT: 291 LBS | SYSTOLIC BLOOD PRESSURE: 131 MMHG | BODY MASS INDEX: 35.44 KG/M2 | HEIGHT: 76 IN | DIASTOLIC BLOOD PRESSURE: 58 MMHG | HEART RATE: 64 BPM | RESPIRATION RATE: 18 BRPM

## 2025-05-12 DIAGNOSIS — R94.39 ABNORMAL STRESS TEST: ICD-10-CM

## 2025-05-12 PROCEDURE — 75580 N-INVAS EST C FFR SW ALY CTA: CPT | Performed by: INTERNAL MEDICINE

## 2025-05-12 PROCEDURE — 75574 CT ANGIO HRT W/3D IMAGE: CPT | Performed by: INTERNAL MEDICINE

## 2025-05-12 RX ORDER — DILTIAZEM HYDROCHLORIDE 5 MG/ML
5 INJECTION INTRAVENOUS SEE ADMIN INSTRUCTIONS
Status: DISCONTINUED | OUTPATIENT
Start: 2025-05-12 | End: 2025-05-14

## 2025-05-12 RX ORDER — METOPROLOL TARTRATE 25 MG/1
50 TABLET, FILM COATED ORAL ONCE AS NEEDED
Status: COMPLETED | OUTPATIENT
Start: 2025-05-12 | End: 2025-05-12

## 2025-05-12 RX ORDER — NITROGLYCERIN 0.4 MG/1
0.4 TABLET SUBLINGUAL ONCE
Status: COMPLETED | OUTPATIENT
Start: 2025-05-12 | End: 2025-05-12

## 2025-05-12 RX ORDER — METOPROLOL TARTRATE 1 MG/ML
INJECTION, SOLUTION INTRAVENOUS
Status: DISPENSED
Start: 2025-05-12 | End: 2025-05-12

## 2025-05-12 RX ORDER — METOPROLOL TARTRATE 25 MG/1
100 TABLET, FILM COATED ORAL ONCE AS NEEDED
Status: COMPLETED | OUTPATIENT
Start: 2025-05-12 | End: 2025-05-12

## 2025-05-12 RX ORDER — METOPROLOL TARTRATE 25 MG/1
100 TABLET, FILM COATED ORAL ONCE AS NEEDED
Status: ACTIVE | OUTPATIENT
Start: 2025-05-12 | End: 2025-05-12

## 2025-05-12 RX ORDER — METOPROLOL TARTRATE 25 MG/1
TABLET, FILM COATED ORAL
Status: COMPLETED
Start: 2025-05-12 | End: 2025-05-12

## 2025-05-12 RX ORDER — METOPROLOL TARTRATE 25 MG/1
50 TABLET, FILM COATED ORAL ONCE AS NEEDED
Status: ACTIVE | OUTPATIENT
Start: 2025-05-12 | End: 2025-05-12

## 2025-05-12 RX ORDER — METOPROLOL TARTRATE 1 MG/ML
5 INJECTION, SOLUTION INTRAVENOUS SEE ADMIN INSTRUCTIONS
Status: DISCONTINUED | OUTPATIENT
Start: 2025-05-12 | End: 2025-05-14

## 2025-05-12 RX ADMIN — NITROGLYCERIN 0.4 MG: 0.4 TABLET SUBLINGUAL at 10:07:00

## 2025-05-12 RX ADMIN — METOPROLOL TARTRATE 50 MG: 25 TABLET, FILM COATED ORAL at 09:12:00

## 2025-05-12 NOTE — IMAGING NOTE
TO RAD HOLDING AT 0856      HX TAKEN: ABNORMAL STRESS TEST, DONE FOR PRE-OP CLEARANCE      PT CONSENTED AT 0909     BASELINE VITAL SIGNS: HR 64  /25, BMI 35.4    CTA ORDERED BY JEWELL HOUSE MD; WAS PT GIVEN CTA PREMEDS NO, PT ON METOPROLOL BID    METOPROLOL PO GIVEN 50 MILLIGRAMS  AT 0912    18 GAUGE IV STARTED AT 0915, 4/23/25 GFR = 92  CREATINE = 0.92    0954: HR 62 /73, METOPROLOL 5 MILLIGRAMS GIVEN IV PUSH  AT 0956, SEE  PROTOCOL    TO CT TABLE @ 1005    CONNECT TO MONITOR  HR 64 /78 AT 1006, HR DOWN TO 59 WITH RELAXATION & BREATH HOLDS       NITROGLYCERIN 0.4 MILLIGRAMS SUBLINGUAL GIVEN AT 1007     CALCIUM SCORE NOT ORDERED     INJECTION STARTED AT 1012, HR 56 DURING SCAN, PROCEDURE COMPLETE    POST SCAN HR 74 /73 AT 1015, PT WITH MILD HEADACHE, NOTED MOST OF SL NTG TAB INTACT UNDER PT'S TONGUE, REMOVED    PT TO HOLDING AREA,  VS HR 59 /66 AT 1020, PT FEELS WELL, GIVEN WATER     AVS  PROVIDED      VS  HR 51 /73 AT 1041    1048  DISCHARGED HOME

## 2025-05-14 ENCOUNTER — PATIENT MESSAGE (OUTPATIENT)
Dept: INTERNAL MEDICINE CLINIC | Facility: CLINIC | Age: 65
End: 2025-05-14

## 2025-05-16 NOTE — TELEPHONE ENCOUNTER
Spoke to Vandana at [facility] and requested that they fax us addended note after having reviewed recent CTA test. Stated surgery is next Tuesday and we need to review cardiac clearance prior to then. Will wait for fax.

## 2025-05-19 ENCOUNTER — MED REC SCAN ONLY (OUTPATIENT)
Dept: INTERNAL MEDICINE CLINIC | Facility: CLINIC | Age: 65
End: 2025-05-19

## 2025-05-19 NOTE — TELEPHONE ENCOUNTER
Cardiac clearance received.  Patient okay to proceed with surgery on May 20.  Addendum made to office note on 423.  Please fax preoperative testing to Dr. Steinberg. Thank you

## 2025-05-19 NOTE — H&P
[facility]  part of [facility]    History & Physical    Lee Sunshine Patient Status:  Outpatient in a Bed    1960 MRN [id number]   Location [facility] OPERATING ROOM Attending Thaddeus Steinberg, *   Hosp Day # 0 PCP Etienne Christopher MD     Date:  2025    History provided by:patient  Chief Complaint:   Right knee pain    HPI:   Lee Sunshine is a(n) 65 year old male.  He presents with complaints of right knee pain.  He reports that the knee pain feels just like the left knee did before he had it replaced.  He has had several cortisone injections, used NSAIDs, and done therapeutic exercises without significant relief.     History   Past Medical History[1]  Past Surgical History[2]  Family History[3]  Social History:  Short Social Hx on File[4]  Allergies/Medications:   Allergies: Allergies[5]  Prescriptions Prior to Admission[6]    Review of Systems:   Pertinent items are noted in HPI.    Physical Exam:   Vital Signs:  Height 6' 4\" (1.93 m), weight 290 lb (131.5 kg).     General appearance: alert, appears stated age and cooperative  Extremities: : He has an antalgic gait.  Mild varus deformity of the right knee.  10 degree flexion traction with further flexion 120 degrees.  Crepitus with motion.  No instability.  Neurological: Active plantarflexion dorsiflexion of feet.  Pulses: 2+ posterior tibial pulse.        Results:     Lab Results   Component Value Date    WBC 6.8 2025    HGB 16.5 2025    HCT 47.7 2025    .0 2025    CREATSERUM 0.92 2025    BUN 13 2025     2025    K 4.3 2025     2025    CO2 26.0 2025     (H) 2025    CA 9.7 2025    ALB 5.0 (H) 2025    ALKPHO 56 2025    BILT 1.0 2025    TP 7.5 2025    AST 33 2025    ALT 49 2025    PTT 31.5 2025    INR 1.00 2025    T4F 0.84 2019    TSH 1.745 2025    GARFIELD 41  06/17/2017    LIP 27 06/17/2017    PSA 0.4 03/08/2018    DDIMER <0.27 06/13/2017    ESRML 5 01/16/2019    CRP <0.5 01/16/2019    MG 2.3 01/05/2018    TROP 0.00 06/13/2017    TROP 0.00 06/13/2017    B12 488 04/10/2021       No results found.    Imaging: Advanced osteoarthritis of the right knee with complete loss of medial joint space, subchondral sclerosis and osteophyte formation.  There is patellofemoral stress as well..     Assessment/Plan:      He has advanced osteoarthritis of the knee that has not responded to conservative management.  I discussed right total knee replacement.  We discussed the risks and indications for surgery as well as the common possible complications.  Our discussion included, but was not limited to the potential risks of anesthetic complication, infection, bleeding, DVT or PE, nerve or blood vessel injury, stiffness, the potential need for revision surgery, leg length inequality, as well as the potential risk of unanticipated perioperative medical or orthopedic complications. He would like to proceed. He has received his clearances.      ARIADNE KERR PA-C  5/19/2025         [1]   Past Medical History:   Agatston coronary artery calcium score between 100 and 199    Arrhythmia    A_Fib intermittently    ASD (atrial septal defect) (HCC)    Coronary atherosclerosis    High cholesterol    Obesity due to excess calories    Osteoarthritis    Paroxysmal atrial fibrillation (HCC)   [2]   Past Surgical History:  Procedure Laterality Date    Ablate arrhythmia add on  9/25/2023    Cholecystectomy      Colonoscopy N/A 1/25/2019    Procedure: COLONOSCOPY;  Surgeon: Miguel Angel Roche MD;  Location: [facility] ENDOSCOPY    Colonoscopy      Colonoscopy N/A 5/27/2022    Procedure: COLONOSCOPY;  Surgeon: Miguel Angel Roche MD;  Location: [facility] ENDOSCOPY    Ep cardioversion 1x  1/9/2018, April 2017         Ep cardioversion 1x  2/16/2022         Ep cardioversion 1x  11/16/2022         Ep cardioversion  1x  10/4/2024    Ep cardioversion 1x  4/9/2025    Laparoscopic cholecystectomy      Repr asd ostium premum  1990 approx    Stim/pacing heart post iv drug infu  9/25/2023    Tx atrial fib pulm vein isol  9/25/2023   [3]   Family History  Problem Relation Age of Onset    Heart Disorder Mother 35        Heart attack    Cancer Father    [4]   Social History  Socioeconomic History    Marital status:    Tobacco Use    Smoking status: Never    Smokeless tobacco: Never   Vaping Use    Vaping status: Never Used   Substance and Sexual Activity    Alcohol use: Not Currently     Alcohol/week: 3.0 - 4.0 standard drinks of alcohol     Types: 1 - 2 Cans of beer, 2 Standard drinks or equivalent per week    Drug use: No    Sexual activity: Yes     Partners: Female   Other Topics Concern    Caffeine Concern No    Exercise No     Social Drivers of Health     Food Insecurity: No Food Insecurity (2/25/2025)    NCSS - Food Insecurity     Worried About Running Out of Food in the Last Year: No     Ran Out of Food in the Last Year: No   Transportation Needs: No Transportation Needs (2/25/2025)    NCSS - Transportation     Lack of Transportation: No   Housing Stability: Not At Risk (2/25/2025)    NCSS - Housing/Utilities     Has Housing: Yes     Worried About Losing Housing: No     Unable to Get Utilities: No   [5] No Known Allergies  [6]   No medications prior to admission.

## 2025-05-20 ENCOUNTER — APPOINTMENT (OUTPATIENT)
Dept: GENERAL RADIOLOGY | Facility: HOSPITAL | Age: 65
End: 2025-05-20
Attending: ORTHOPAEDIC SURGERY
Payer: MEDICARE

## 2025-05-20 ENCOUNTER — ANESTHESIA EVENT (OUTPATIENT)
Dept: SURGERY | Facility: HOSPITAL | Age: 65
End: 2025-05-20
Payer: MEDICARE

## 2025-05-20 ENCOUNTER — HOSPITAL ENCOUNTER (OUTPATIENT)
Facility: HOSPITAL | Age: 65
Discharge: HOME HEALTH | End: 2025-05-21
Attending: ORTHOPAEDIC SURGERY | Admitting: ORTHOPAEDIC SURGERY
Payer: MEDICARE

## 2025-05-20 ENCOUNTER — ANESTHESIA (OUTPATIENT)
Dept: SURGERY | Facility: HOSPITAL | Age: 65
End: 2025-05-20
Payer: MEDICARE

## 2025-05-20 DIAGNOSIS — G89.18 POSTOPERATIVE PAIN: Primary | ICD-10-CM

## 2025-05-20 PROCEDURE — 99204 OFFICE O/P NEW MOD 45 MIN: CPT | Performed by: INTERNAL MEDICINE

## 2025-05-20 PROCEDURE — 73560 X-RAY EXAM OF KNEE 1 OR 2: CPT | Performed by: ORTHOPAEDIC SURGERY

## 2025-05-20 DEVICE — IMPLANTABLE DEVICE
Type: IMPLANTABLE DEVICE | Site: KNEE | Status: FUNCTIONAL
Brand: REFOBACIN® BONE CEMENT R

## 2025-05-20 DEVICE — MY KNEE: Type: IMPLANTABLE DEVICE | Site: KNEE

## 2025-05-20 DEVICE — PATELLA RESURFACING # 4 E-CROSS
Type: IMPLANTABLE DEVICE | Site: KNEE | Status: FUNCTIONAL
Brand: GMK

## 2025-05-20 DEVICE — CEMENTED TOTAL KNEE: Type: IMPLANTABLE DEVICE | Site: KNEE

## 2025-05-20 DEVICE — FEMUR SPHERE CEMENTED RIGHT, SIZE 5+
Type: IMPLANTABLE DEVICE | Site: KNEE | Status: FUNCTIONAL
Brand: GMK SPHERE TOTAL KNEE SYSTEM

## 2025-05-20 DEVICE — FIXED TIBIAL TRAY CEMENTED RIGHT, SIZE 5
Type: IMPLANTABLE DEVICE | Site: KNEE | Status: FUNCTIONAL
Brand: GMK PRIMARY TOTAL KNEE SYSTEM

## 2025-05-20 DEVICE — TIBIAL INSERT FIXED SPHERE FLEX   #5/10 MM R E-CROSS
Type: IMPLANTABLE DEVICE | Site: KNEE | Status: FUNCTIONAL
Brand: GMK SPHERE TOTAL KNEE SYSTEM

## 2025-05-20 RX ORDER — ONDANSETRON 2 MG/ML
4 INJECTION INTRAMUSCULAR; INTRAVENOUS ONCE AS NEEDED
Status: ACTIVE | OUTPATIENT
Start: 2025-05-20 | End: 2025-05-20

## 2025-05-20 RX ORDER — HYDROMORPHONE HYDROCHLORIDE 1 MG/ML
0.6 INJECTION, SOLUTION INTRAMUSCULAR; INTRAVENOUS; SUBCUTANEOUS
Status: DISCONTINUED | OUTPATIENT
Start: 2025-05-20 | End: 2025-05-21

## 2025-05-20 RX ORDER — POLYETHYLENE GLYCOL 3350 17 G/17G
17 POWDER, FOR SOLUTION ORAL DAILY PRN
Status: DISCONTINUED | OUTPATIENT
Start: 2025-05-20 | End: 2025-05-21

## 2025-05-20 RX ORDER — METOCLOPRAMIDE HYDROCHLORIDE 5 MG/ML
10 INJECTION INTRAMUSCULAR; INTRAVENOUS ONCE
Status: COMPLETED | OUTPATIENT
Start: 2025-05-20 | End: 2025-05-20

## 2025-05-20 RX ORDER — MORPHINE SULFATE 4 MG/ML
2 INJECTION, SOLUTION INTRAMUSCULAR; INTRAVENOUS EVERY 10 MIN PRN
Status: DISCONTINUED | OUTPATIENT
Start: 2025-05-20 | End: 2025-05-20 | Stop reason: HOSPADM

## 2025-05-20 RX ORDER — OXYCODONE HYDROCHLORIDE 5 MG/1
5 TABLET ORAL EVERY 4 HOURS PRN
Status: DISCONTINUED | OUTPATIENT
Start: 2025-05-20 | End: 2025-05-21

## 2025-05-20 RX ORDER — CEFAZOLIN SODIUM IN 0.9 % NACL 3 G/100 ML
3 INTRAVENOUS SOLUTION, PIGGYBACK (ML) INTRAVENOUS EVERY 8 HOURS
Status: COMPLETED | OUTPATIENT
Start: 2025-05-20 | End: 2025-05-21

## 2025-05-20 RX ORDER — SENNOSIDES 8.6 MG
17.2 TABLET ORAL NIGHTLY
Status: DISCONTINUED | OUTPATIENT
Start: 2025-05-20 | End: 2025-05-21

## 2025-05-20 RX ORDER — OXYCODONE HYDROCHLORIDE 5 MG/1
10 TABLET ORAL EVERY 4 HOURS PRN
Status: DISCONTINUED | OUTPATIENT
Start: 2025-05-20 | End: 2025-05-21

## 2025-05-20 RX ORDER — CELECOXIB 200 MG/1
400 CAPSULE ORAL ONCE
Status: COMPLETED | OUTPATIENT
Start: 2025-05-20 | End: 2025-05-20

## 2025-05-20 RX ORDER — SODIUM PHOSPHATE, DIBASIC AND SODIUM PHOSPHATE, MONOBASIC 7; 19 G/230ML; G/230ML
1 ENEMA RECTAL ONCE AS NEEDED
Status: DISCONTINUED | OUTPATIENT
Start: 2025-05-20 | End: 2025-05-21

## 2025-05-20 RX ORDER — ONDANSETRON 2 MG/ML
4 INJECTION INTRAMUSCULAR; INTRAVENOUS EVERY 6 HOURS PRN
Status: DISCONTINUED | OUTPATIENT
Start: 2025-05-20 | End: 2025-05-20

## 2025-05-20 RX ORDER — HYDROMORPHONE HYDROCHLORIDE 1 MG/ML
0.6 INJECTION, SOLUTION INTRAMUSCULAR; INTRAVENOUS; SUBCUTANEOUS EVERY 5 MIN PRN
Status: DISCONTINUED | OUTPATIENT
Start: 2025-05-20 | End: 2025-05-20 | Stop reason: HOSPADM

## 2025-05-20 RX ORDER — SODIUM CHLORIDE, SODIUM LACTATE, POTASSIUM CHLORIDE, CALCIUM CHLORIDE 600; 310; 30; 20 MG/100ML; MG/100ML; MG/100ML; MG/100ML
INJECTION, SOLUTION INTRAVENOUS CONTINUOUS
Status: DISCONTINUED | OUTPATIENT
Start: 2025-05-20 | End: 2025-05-20 | Stop reason: HOSPADM

## 2025-05-20 RX ORDER — HYDROCODONE BITARTRATE AND ACETAMINOPHEN 7.5; 325 MG/1; MG/1
2 TABLET ORAL EVERY 6 HOURS PRN
Status: DISCONTINUED | OUTPATIENT
Start: 2025-05-20 | End: 2025-05-21

## 2025-05-20 RX ORDER — METOCLOPRAMIDE HYDROCHLORIDE 5 MG/ML
10 INJECTION INTRAMUSCULAR; INTRAVENOUS EVERY 8 HOURS PRN
Status: DISCONTINUED | OUTPATIENT
Start: 2025-05-20 | End: 2025-05-21

## 2025-05-20 RX ORDER — MORPHINE SULFATE 4 MG/ML
4 INJECTION, SOLUTION INTRAMUSCULAR; INTRAVENOUS EVERY 10 MIN PRN
Status: DISCONTINUED | OUTPATIENT
Start: 2025-05-20 | End: 2025-05-20 | Stop reason: HOSPADM

## 2025-05-20 RX ORDER — PROCHLORPERAZINE EDISYLATE 5 MG/ML
5 INJECTION INTRAMUSCULAR; INTRAVENOUS ONCE AS NEEDED
Status: ACTIVE | OUTPATIENT
Start: 2025-05-20 | End: 2025-05-20

## 2025-05-20 RX ORDER — FAMOTIDINE 10 MG/ML
20 INJECTION, SOLUTION INTRAVENOUS ONCE
Status: COMPLETED | OUTPATIENT
Start: 2025-05-20 | End: 2025-05-20

## 2025-05-20 RX ORDER — CEFAZOLIN SODIUM IN 0.9 % NACL 3 G/100 ML
3 INTRAVENOUS SOLUTION, PIGGYBACK (ML) INTRAVENOUS ONCE
Status: COMPLETED | OUTPATIENT
Start: 2025-05-20 | End: 2025-05-20

## 2025-05-20 RX ORDER — ACETAMINOPHEN 325 MG/1
650 TABLET ORAL EVERY 6 HOURS PRN
Status: DISCONTINUED | OUTPATIENT
Start: 2025-05-20 | End: 2025-05-21

## 2025-05-20 RX ORDER — NALOXONE HYDROCHLORIDE 0.4 MG/ML
0.08 INJECTION, SOLUTION INTRAMUSCULAR; INTRAVENOUS; SUBCUTANEOUS AS NEEDED
Status: DISCONTINUED | OUTPATIENT
Start: 2025-05-20 | End: 2025-05-20 | Stop reason: HOSPADM

## 2025-05-20 RX ORDER — DIPHENHYDRAMINE HCL 25 MG
25 CAPSULE ORAL EVERY 4 HOURS PRN
Status: DISCONTINUED | OUTPATIENT
Start: 2025-05-20 | End: 2025-05-21

## 2025-05-20 RX ORDER — DIPHENHYDRAMINE HYDROCHLORIDE 50 MG/ML
25 INJECTION, SOLUTION INTRAMUSCULAR; INTRAVENOUS ONCE AS NEEDED
Status: ACTIVE | OUTPATIENT
Start: 2025-05-20 | End: 2025-05-20

## 2025-05-20 RX ORDER — METOCLOPRAMIDE 10 MG/1
10 TABLET ORAL ONCE
Status: COMPLETED | OUTPATIENT
Start: 2025-05-20 | End: 2025-05-20

## 2025-05-20 RX ORDER — NALOXONE HYDROCHLORIDE 0.4 MG/ML
0.08 INJECTION, SOLUTION INTRAMUSCULAR; INTRAVENOUS; SUBCUTANEOUS
Status: DISCONTINUED | OUTPATIENT
Start: 2025-05-20 | End: 2025-05-21

## 2025-05-20 RX ORDER — BISACODYL 10 MG
10 SUPPOSITORY, RECTAL RECTAL
Status: DISCONTINUED | OUTPATIENT
Start: 2025-05-20 | End: 2025-05-21

## 2025-05-20 RX ORDER — DIPHENHYDRAMINE HYDROCHLORIDE 50 MG/ML
12.5 INJECTION, SOLUTION INTRAMUSCULAR; INTRAVENOUS EVERY 4 HOURS PRN
Status: DISCONTINUED | OUTPATIENT
Start: 2025-05-20 | End: 2025-05-21

## 2025-05-20 RX ORDER — HYDROMORPHONE HYDROCHLORIDE 1 MG/ML
0.8 INJECTION, SOLUTION INTRAMUSCULAR; INTRAVENOUS; SUBCUTANEOUS EVERY 2 HOUR PRN
Status: DISCONTINUED | OUTPATIENT
Start: 2025-05-20 | End: 2025-05-21

## 2025-05-20 RX ORDER — HYDROMORPHONE HYDROCHLORIDE 1 MG/ML
0.2 INJECTION, SOLUTION INTRAMUSCULAR; INTRAVENOUS; SUBCUTANEOUS EVERY 5 MIN PRN
Status: DISCONTINUED | OUTPATIENT
Start: 2025-05-20 | End: 2025-05-20 | Stop reason: HOSPADM

## 2025-05-20 RX ORDER — BUPIVACAINE HYDROCHLORIDE AND EPINEPHRINE 5; 5 MG/ML; UG/ML
INJECTION, SOLUTION PERINEURAL AS NEEDED
Status: DISCONTINUED | OUTPATIENT
Start: 2025-05-20 | End: 2025-05-20 | Stop reason: HOSPADM

## 2025-05-20 RX ORDER — DOCUSATE SODIUM 100 MG/1
100 CAPSULE, LIQUID FILLED ORAL 2 TIMES DAILY
Status: DISCONTINUED | OUTPATIENT
Start: 2025-05-20 | End: 2025-05-21

## 2025-05-20 RX ORDER — HYDROMORPHONE HYDROCHLORIDE 1 MG/ML
0.4 INJECTION, SOLUTION INTRAMUSCULAR; INTRAVENOUS; SUBCUTANEOUS EVERY 5 MIN PRN
Status: DISCONTINUED | OUTPATIENT
Start: 2025-05-20 | End: 2025-05-20 | Stop reason: HOSPADM

## 2025-05-20 RX ORDER — HYDROCODONE BITARTRATE AND ACETAMINOPHEN 7.5; 325 MG/1; MG/1
1 TABLET ORAL EVERY 6 HOURS PRN
Status: DISCONTINUED | OUTPATIENT
Start: 2025-05-20 | End: 2025-05-21

## 2025-05-20 RX ORDER — HYDROMORPHONE HYDROCHLORIDE 1 MG/ML
0.4 INJECTION, SOLUTION INTRAMUSCULAR; INTRAVENOUS; SUBCUTANEOUS
Status: DISCONTINUED | OUTPATIENT
Start: 2025-05-20 | End: 2025-05-21

## 2025-05-20 RX ORDER — NALBUPHINE HYDROCHLORIDE 10 MG/ML
2.5 INJECTION INTRAMUSCULAR; INTRAVENOUS; SUBCUTANEOUS EVERY 4 HOURS PRN
Status: DISCONTINUED | OUTPATIENT
Start: 2025-05-20 | End: 2025-05-21

## 2025-05-20 RX ORDER — MORPHINE SULFATE 10 MG/ML
6 INJECTION, SOLUTION INTRAMUSCULAR; INTRAVENOUS EVERY 10 MIN PRN
Status: DISCONTINUED | OUTPATIENT
Start: 2025-05-20 | End: 2025-05-20 | Stop reason: HOSPADM

## 2025-05-20 RX ORDER — LIDOCAINE HYDROCHLORIDE 10 MG/ML
INJECTION, SOLUTION INFILTRATION; PERINEURAL
Status: COMPLETED | OUTPATIENT
Start: 2025-05-20 | End: 2025-05-20

## 2025-05-20 RX ORDER — SENNOSIDES 8.6 MG
325 CAPSULE ORAL 2 TIMES DAILY
Status: DISCONTINUED | OUTPATIENT
Start: 2025-05-20 | End: 2025-05-21

## 2025-05-20 RX ORDER — TRANEXAMIC ACID 10 MG/ML
INJECTION, SOLUTION INTRAVENOUS AS NEEDED
Status: DISCONTINUED | OUTPATIENT
Start: 2025-05-20 | End: 2025-05-20 | Stop reason: SURG

## 2025-05-20 RX ORDER — MORPHINE SULFATE 1 MG/ML
INJECTION, SOLUTION EPIDURAL; INTRATHECAL; INTRAVENOUS
Status: COMPLETED | OUTPATIENT
Start: 2025-05-20 | End: 2025-05-20

## 2025-05-20 RX ORDER — BUPIVACAINE HYDROCHLORIDE 7.5 MG/ML
INJECTION, SOLUTION INTRASPINAL
Status: COMPLETED | OUTPATIENT
Start: 2025-05-20 | End: 2025-05-20

## 2025-05-20 RX ORDER — SOTALOL HYDROCHLORIDE 80 MG/1
160 TABLET ORAL 2 TIMES DAILY
Status: DISCONTINUED | OUTPATIENT
Start: 2025-05-21 | End: 2025-05-21

## 2025-05-20 RX ORDER — HYDROMORPHONE HYDROCHLORIDE 1 MG/ML
0.4 INJECTION, SOLUTION INTRAMUSCULAR; INTRAVENOUS; SUBCUTANEOUS EVERY 2 HOUR PRN
Status: DISCONTINUED | OUTPATIENT
Start: 2025-05-20 | End: 2025-05-21

## 2025-05-20 RX ORDER — HALOPERIDOL 5 MG/ML
0.5 INJECTION INTRAMUSCULAR ONCE AS NEEDED
Status: ACTIVE | OUTPATIENT
Start: 2025-05-20 | End: 2025-05-20

## 2025-05-20 RX ORDER — FAMOTIDINE 20 MG/1
20 TABLET, FILM COATED ORAL ONCE
Status: COMPLETED | OUTPATIENT
Start: 2025-05-20 | End: 2025-05-20

## 2025-05-20 RX ORDER — SODIUM CHLORIDE, SODIUM LACTATE, POTASSIUM CHLORIDE, CALCIUM CHLORIDE 600; 310; 30; 20 MG/100ML; MG/100ML; MG/100ML; MG/100ML
INJECTION, SOLUTION INTRAVENOUS CONTINUOUS
Status: DISCONTINUED | OUTPATIENT
Start: 2025-05-20 | End: 2025-05-21

## 2025-05-20 RX ORDER — MIDAZOLAM HYDROCHLORIDE 1 MG/ML
INJECTION INTRAMUSCULAR; INTRAVENOUS AS NEEDED
Status: DISCONTINUED | OUTPATIENT
Start: 2025-05-20 | End: 2025-05-20 | Stop reason: SURG

## 2025-05-20 RX ORDER — ACETAMINOPHEN 500 MG
1000 TABLET ORAL ONCE
Status: COMPLETED | OUTPATIENT
Start: 2025-05-20 | End: 2025-05-20

## 2025-05-20 RX ORDER — ROSUVASTATIN CALCIUM 10 MG/1
10 TABLET, COATED ORAL NIGHTLY
Status: DISCONTINUED | OUTPATIENT
Start: 2025-05-20 | End: 2025-05-21

## 2025-05-20 RX ORDER — METOPROLOL TARTRATE 25 MG/1
25 TABLET, FILM COATED ORAL ONCE AS NEEDED
Status: DISCONTINUED | OUTPATIENT
Start: 2025-05-20 | End: 2025-05-20 | Stop reason: HOSPADM

## 2025-05-20 RX ADMIN — MIDAZOLAM HYDROCHLORIDE 2 MG: 1 INJECTION INTRAMUSCULAR; INTRAVENOUS at 12:58:00

## 2025-05-20 RX ADMIN — CEFAZOLIN SODIUM IN 0.9 % NACL 3 G: 3 G/100 ML INTRAVENOUS SOLUTION, PIGGYBACK (ML) INTRAVENOUS at 11:29:00

## 2025-05-20 RX ADMIN — BUPIVACAINE HYDROCHLORIDE 1.6 ML: 7.5 INJECTION, SOLUTION INTRASPINAL at 11:40:00

## 2025-05-20 RX ADMIN — LIDOCAINE HYDROCHLORIDE 5 ML: 10 INJECTION, SOLUTION INFILTRATION; PERINEURAL at 11:40:00

## 2025-05-20 RX ADMIN — MORPHINE SULFATE 0.3 MG: 1 INJECTION, SOLUTION EPIDURAL; INTRATHECAL; INTRAVENOUS at 11:40:00

## 2025-05-20 RX ADMIN — SODIUM CHLORIDE, SODIUM LACTATE, POTASSIUM CHLORIDE, CALCIUM CHLORIDE: 600; 310; 30; 20 INJECTION, SOLUTION INTRAVENOUS at 13:42:00

## 2025-05-20 RX ADMIN — TRANEXAMIC ACID 1000 MG: 10 INJECTION, SOLUTION INTRAVENOUS at 11:51:00

## 2025-05-20 NOTE — INTERVAL H&P NOTE
Pre-op Diagnosis: Osteoarthritis right knee    The above referenced H&P was reviewed by CHANTELL BAHENA MD on 5/20/2025, the patient was examined and no significant changes have occurred in the patient's condition since the H&P was performed.  I discussed with the patient and/or legal representative the potential benefits, risks and side effects of this procedure; the likelihood of the patient achieving goals; and potential problems that might occur during recuperation.  I discussed reasonable alternatives to the procedure, including risks, benefits and side effects related to the alternatives and risks related to not receiving this procedure.  We will proceed with procedure as planned.

## 2025-05-20 NOTE — ANESTHESIA PREPROCEDURE EVALUATION
Anesthesia PreOp Note    HPI:     Lee Sunshine is a 65 year old male who presents for preoperative consultation requested by: Thaddeus Steinberg MD    Date of Surgery: 5/20/2025    Procedure(s):  Right total knee arthroplasty  Indication: Osteoarthritis right knee    Relevant Problems   No relevant active problems       NPO:  Last Liquid Consumption Date: 05/20/25  Last Liquid Consumption Time: 0700 (WATER WITH MEDS)  Last Solid Consumption Date: 05/19/25  Last Solid Consumption Time: 1830  Last Liquid Consumption Date: 05/20/25          History Review:  Patient Active Problem List    Diagnosis Date Noted    Primary osteoarthritis of left knee 08/24/2020    Agatston coronary artery calcium score between 100 and 199 08/20/2020    Long term current use of anticoagulant 10/14/2019    Hyperlipidemia 10/11/2018    Obesity due to excess calories 12/21/2017    Paroxysmal atrial fibrillation (HCC) 04/26/2017    ASD (atrial septal defect) (HCC) 04/26/2017       Past Medical History[1]    Past Surgical History[2]    Prescriptions Prior to Admission[3]  Current Medications and Prescriptions Ordered in Epic[4]    Allergies[5]    Family History[6]  Social Hx on file[7]    Available pre-op labs reviewed.  Lab Results   Component Value Date    WBC 6.8 04/23/2025    RBC 5.59 04/23/2025    HGB 16.5 04/23/2025    HCT 47.7 04/23/2025    MCV 85.3 04/23/2025    MCH 29.5 04/23/2025    MCHC 34.6 04/23/2025    RDW 12.5 04/23/2025    .0 04/23/2025     Lab Results   Component Value Date     04/23/2025    K 4.3 04/23/2025     04/23/2025    CO2 26.0 04/23/2025    BUN 13 04/23/2025    CREATSERUM 0.92 04/23/2025     (H) 04/23/2025    CA 9.7 04/23/2025          Vital Signs:  Body mass index is 35.54 kg/m².   height is 1.93 m (6' 4\") and weight is 132.5 kg (292 lb). His oral temperature is 98.3 °F (36.8 °C). His blood pressure is 121/71 and his pulse is 56. His respiration is 16 and oxygen saturation is 90%.    Vitals:    05/16/25 1112 05/20/25 0933   BP:  121/71   Pulse:  56   Resp:  16   Temp:  98.3 °F (36.8 °C)   TempSrc:  Oral   SpO2:  90%   Weight: 131.5 kg (290 lb) 132.5 kg (292 lb)   Height: 1.93 m (6' 4\")         Anesthesia Evaluation     Patient summary reviewed and Nursing notes reviewed    Airway   Mallampati: II  TM distance: >3 FB  Neck ROM: full  Dental      Pulmonary - negative ROS and normal exam   Cardiovascular - normal exam  (+) CAD, dysrhythmias    ROS comment: ASD, was on Eliquis, stopped 5/17/25    Neuro/Psych - negative ROS     GI/Hepatic/Renal      Endo/Other    (+) arthritis    Comments: Obesity  Abdominal   (+) obese                 Anesthesia Plan:   ASA:  3  Plan:   Spinal  Post-op Pain Management: Intrathecal narcotics  Informed Consent Plan and Risks Discussed With:  Patient  Use of Blood Products Discussed With:  Patient  Blood Product Use Consented    Discussed plan with:  Surgeon      I have informed Lee ESPERANZA Welsharnol and/or legal guardian or family member of the nature of the anesthetic plan, benefits, risks including possible dental damage if relevant, major complications, and any alternative forms of anesthetic management.   All of the patient's questions were answered to the best of my ability. The patient desires the anesthetic management as planned.  Chilango Borges MD  5/20/2025 9:41 AM  Present on Admission:  **None**           [1]   Past Medical History:   Agatston coronary artery calcium score between 100 and 199    Arrhythmia    A_Fib intermittently    ASD (atrial septal defect) (HCC)    Coronary atherosclerosis    High cholesterol    Obesity due to excess calories    Osteoarthritis    Paroxysmal atrial fibrillation (HCC)   [2]   Past Surgical History:  Procedure Laterality Date    Ablate arrhythmia add on  9/25/2023    Cholecystectomy      Colonoscopy N/A 1/25/2019    Procedure: COLONOSCOPY;  Surgeon: Miguel Angel Roche MD;  Location: [facility] ENDOSCOPY    Colonoscopy       Colonoscopy N/A 2022    Procedure: COLONOSCOPY;  Surgeon: Miguel Angel Roche MD;  [facility] ENDOSCOPY    Ep cardioversion 1x  1/9/2018, 2017         Ep cardioversion 1x  2/16/2022         Ep cardioversion 1x  11/16/2022         Ep cardioversion 1x  10/4/2024    Ep cardioversion 1x  4/9/2025    Laparoscopic cholecystectomy      Repr asd ostium premum   approx    Stim/pacing heart post iv drug infu  2023    Tx atrial fib pulm vein isol  2023   [3]   Medications Prior to Admission   Medication Sig Dispense Refill Last Dose/Taking    Sotalol HCl 160 MG Oral Tab Take 1 tablet (160 mg total) by mouth in the morning and 1 tablet (160 mg total) before bedtime.   2025 Morning    metoprolol tartrate 50 MG Oral Tab Take 0.5 tablets (25 mg total) by mouth 2 (two) times daily. 180 tablet 3 2025 Morning    apixaban (ELIQUIS) 5 MG Oral Tab Take 1 tablet (5 mg total) by mouth in the morning and 1 tablet (5 mg total) before bedtime.   2025    rosuvastatin 10 MG Oral Tab Take 1 tablet (10 mg total) by mouth nightly. 90 tablet 3 2025    [] polyethylene glycol, PEG 3350-KCl-NaBcb-NaCl-NaSulf, 236 g Oral Recon Soln Take 4,000 mL by mouth once for 1 dose. As directed by GI clinic instructions. 4000 mL 0    [4]   Current Facility-Administered Medications Ordered in Epic   Medication Dose Route Frequency Provider Last Rate Last Admin    lactated ringers infusion   Intravenous Continuous Thaddeus Steinberg MD        acetaminophen (Tylenol Extra Strength) tab 1,000 mg  1,000 mg Oral Once Thaddeus Steinberg MD        metoprolol tartrate (Lopressor) tab 25 mg  25 mg Oral Once PRN Thaddeus Steinberg MD        famotidine (Pepcid) tab 20 mg  20 mg Oral Once Thaddeus Steinberg MD        Or    famotidine (Pepcid) 20 mg/2mL injection 20 mg  20 mg Intravenous Once Thaddeus Steinberg MD        metoclopramide (Reglan) tab 10 mg  10 mg Oral Once Thaddeus Steinberg MD         Or    metoclopramide (Reglan) 5 mg/mL injection 10 mg  10 mg Intravenous Once Thaddeus Steinberg MD        celecoxib (CeleBREX) cap 400 mg  400 mg Oral Once Thaddeus Steinberg MD        ceFAZolin (Ancef) 3 g in sodium chloride 0.9% 100mL IVPB premix  3 g Intravenous Once Thaddeus Steinberg MD        vancomycin (Vancocin) 1,000 mg in sodium chloride 0.9% 250 mL IVPB-ADDV  1,000 mg Intravenous Once Thaddeus Steinberg MD         No current [facility]-ordered outpatient medications on file.   [5] No Known Allergies  [6]   Family History  Problem Relation Age of Onset    Heart Disorder Mother 35        Heart attack    Cancer Father    [7]   Social History  Socioeconomic History    Marital status:    Tobacco Use    Smoking status: Never    Smokeless tobacco: Never   Vaping Use    Vaping status: Never Used   Substance and Sexual Activity    Alcohol use: Not Currently     Alcohol/week: 3.0 - 4.0 standard drinks of alcohol     Types: 1 - 2 Cans of beer, 2 Standard drinks or equivalent per week    Drug use: No    Sexual activity: Yes     Partners: Female   Other Topics Concern    Caffeine Concern No    Exercise No

## 2025-05-20 NOTE — ANESTHESIA POSTPROCEDURE EVALUATION
Patient: Lee Sunshine    Procedure Summary       Date: 05/20/25 Room / Location: [facility] MAIN OR  / [facility] MAIN OR    Anesthesia Start: 1128 Anesthesia Stop:     Procedure: Right total knee arthroplasty (Right: Knee) Diagnosis: (Osteoarthritis right knee)    Surgeons: Thaddeus Steinberg MD Anesthesiologist: Chilango Borges MD    Anesthesia Type: spinal ASA Status: 3            Anesthesia Type: spinal    Vitals Value Taken Time   /46 05/20/25 13:42   Temp 97.6 °F (36.4 °C) 05/20/25 13:42   Pulse 68 05/20/25 13:42   Resp 14 05/20/25 13:42   SpO2 100 % 05/20/25 13:42       EM AN Post Evaluation:   Patient Evaluated in PACU  Patient Participation: complete - patient participated  Level of Consciousness: awake  Pain Management: adequate  Airway Patency:patent  Dental exam unchanged from preop  Yes    Cardiovascular Status: acceptable  Respiratory Status: acceptable  Postoperative Hydration acceptable      Chilango Borges MD  5/20/2025 1:42 PM

## 2025-05-20 NOTE — ANESTHESIA PROCEDURE NOTES
Spinal Block    Date/Time: 5/20/2025 11:40 AM    Performed by: Chilango Borges MD  Authorized by: Chilango Borges MD      General Information and Staff    Start Time:  5/20/2025 11:40 AM  End Time:  5/20/2025 11:50 AM  Anesthesiologist:  Chilango Borges MD  Performed by:  Anesthesiologist  Patient Location:  OR  Site identification: surface landmarks    Reason for Block: at surgeon's request, post-op pain management and surgical anesthesia    Preanesthetic Checklist: patient identified, IV checked, risks and benefits discussed, monitors and equipment checked, pre-op evaluation, timeout performed, anesthesia consent and sterile technique used      Procedure Details    Patient Position:  Sitting  Prep: Betadine and patient draped    Monitoring:  Heart rate, cardiac monitor and continuous pulse ox  Approach:  Midline  Location:  L3-4  Injection Technique:  Single-shot    Needle    Needle Type:  Sprotte  Needle Gauge:  24 G  Needle Length:  3.5 in    Assessment    Sensory Level:  T6  Events: clear CSF, CSF aspirated, well tolerated and blood negative      Additional Comments     Uneventful

## 2025-05-20 NOTE — BRIEF OP NOTE
Pre-Operative Diagnosis: Osteoarthritis right knee     Post-Operative Diagnosis: Osteoarthritis right knee      Procedure Performed:   Right total knee arthroplasty    Surgeons and Role:     * Chantell Steinberg MD - Primary    Assistant(s):  Surgical Assistant.: Anuja Mckinnon; Js Ness     Surgical Findings: OA     Specimen: path     Estimated Blood Loss: 50ml    Dictation Number:  [id number]    CHANTELL STEINBERG MD  5/20/2025  1:22 PM

## 2025-05-21 VITALS
SYSTOLIC BLOOD PRESSURE: 124 MMHG | OXYGEN SATURATION: 94 % | HEART RATE: 78 BPM | RESPIRATION RATE: 18 BRPM | BODY MASS INDEX: 35.56 KG/M2 | WEIGHT: 292 LBS | HEIGHT: 76 IN | DIASTOLIC BLOOD PRESSURE: 70 MMHG | TEMPERATURE: 99 F

## 2025-05-21 PROBLEM — Z96.651 S/P TOTAL KNEE ARTHROPLASTY, RIGHT: Status: ACTIVE | Noted: 2025-05-21

## 2025-05-21 LAB
HCT VFR BLD AUTO: 39 % (ref 39–53)
HGB BLD-MCNC: 13.1 G/DL (ref 13–17.5)

## 2025-05-21 PROCEDURE — 99214 OFFICE O/P EST MOD 30 MIN: CPT | Performed by: HOSPITALIST

## 2025-05-21 RX ORDER — HYDROCODONE BITARTRATE AND ACETAMINOPHEN 7.5; 325 MG/1; MG/1
1 TABLET ORAL EVERY 6 HOURS PRN
Qty: 40 TABLET | Refills: 0 | Status: SHIPPED | OUTPATIENT
Start: 2025-05-21

## 2025-05-21 RX ORDER — PSEUDOEPHEDRINE HCL 30 MG
100 TABLET ORAL 2 TIMES DAILY
Qty: 20 CAPSULE | Refills: 0 | Status: SHIPPED | OUTPATIENT
Start: 2025-05-21

## 2025-05-21 NOTE — PLAN OF CARE
Problem: Patient Centered Care  Goal: Patient preferences are identified and integrated in the patient's plan of care  Description: Interventions:  - What would you like us to know as we care for you?   - Provide timely, complete, and accurate information to patient/family  - Incorporate patient and family knowledge, values, beliefs, and cultural backgrounds into the planning and delivery of care  - Encourage patient/family to participate in care and decision-making at the level they choose  - Honor patient and family perspectives and choices  Outcome: Adequate for Discharge     Problem: Patient/Family Goals  Goal: Patient/Family Long Term Goal  Description: Patient's Long Term Goal:     Interventions:  -   - See additional Care Plan goals for specific interventions  Outcome: Adequate for Discharge  Goal: Patient/Family Short Term Goal  Description: Patient's Short Term Goal:     Interventions:   -   - See additional Care Plan goals for specific interventions  Outcome: Adequate for Discharge     Problem: PAIN - ADULT  Goal: Verbalizes/displays adequate comfort level or patient's stated pain goal  Description: INTERVENTIONS:  - Encourage pt to monitor pain and request assistance  - Assess pain using appropriate pain scale  - Administer analgesics based on type and severity of pain and evaluate response  - Implement non-pharmacological measures as appropriate and evaluate response  - Consider cultural and social influences on pain and pain management  - Manage/alleviate anxiety  - Utilize distraction and/or relaxation techniques  - Monitor for opioid side effects  - Notify MD/LIP if interventions unsuccessful or patient reports new pain  - Anticipate increased pain with activity and pre-medicate as appropriate  Outcome: Adequate for Discharge     Problem: RISK FOR INFECTION - ADULT  Goal: Absence of fever/infection during anticipated neutropenic period  Description: INTERVENTIONS  - Monitor WBC  - Administer growth  factors as ordered  - Implement neutropenic guidelines  Outcome: Adequate for Discharge     Problem: SAFETY ADULT - FALL  Goal: Free from fall injury  Description: INTERVENTIONS:  - Assess pt frequently for physical needs  - Identify cognitive and physical deficits and behaviors that affect risk of falls.  - [place] fall precautions as indicated by assessment.  - Educate pt/family on patient safety including physical limitations  - Instruct pt to call for assistance with activity based on assessment  - Modify environment to reduce risk of injury  - Provide assistive devices as appropriate  - Consider OT/PT consult to assist with strengthening/mobility  - Encourage toileting schedule  Outcome: Adequate for Discharge     Problem: DISCHARGE PLANNING  Goal: Discharge to home or other facility with appropriate resources  Description: INTERVENTIONS:  - Identify barriers to discharge w/pt and caregiver  - Include patient/family/discharge partner in discharge planning  - Arrange for needed discharge resources and transportation as appropriate  - Identify discharge learning needs (meds, wound care, etc)  - Arrange for interpreters to assist at discharge as needed  - Consider post-discharge preferences of patient/family/discharge partner  - Complete POLST form as appropriate  - Assess patient's ability to be responsible for managing their own health  - Refer to Case Management Department for coordinating discharge planning if the patient needs post-hospital services based on physician/LIP order or complex needs related to functional status, cognitive ability or social support system  Outcome: Adequate for Discharge   Cleared to go home by surgery, internal medicine, and physical therapy. Follow up and contingency instructions given to patient

## 2025-05-21 NOTE — OCCUPATIONAL THERAPY NOTE
OCCUPATIONAL THERAPY EVALUATION - INPATIENT     Room Number: 407/407-A  Evaluation Date: 2025  Type of Evaluation: Initial  Presenting Problem: R TKA; hx of L TKA    Physician Order: IP Consult to Occupational Therapy  Reason for Therapy: ADL/IADL Dysfunction and Discharge Planning    OCCUPATIONAL THERAPY ASSESSMENT   Patient is a 65 year old male admitted 2025 for R TKA.  Prior to admission, patient's baseline is independent .  Patient is currently functioning near baseline with self care and basic mobility; pt has no further OT needs at the acute care level.    PLAN  Patient has been evaluated and presents with no skilled Occupational Therapy needs  at this time.  Patient will be discharged from Occupational Therapy services. Please re-order if a new functional limitation presents during this admission.    OT Device Recommendations: None    OCCUPATIONAL THERAPY MEDICAL/SOCIAL HISTORY   Problem List  Active Problems:    * No active hospital problems. *    HOME SITUATION  Type of Home: House  Home Layout: One level  Lives With: Spouse  Toilet and Equipment: Comfort height toilet  Shower/Tub and Equipment: Walk-in shower  Drives: Yes  Patient Regularly Uses: Rolling walker    SUBJECTIVE  Pt reports having other surgery in     OCCUPATIONAL THERAPY EXAMINATION    OBJECTIVE  Precautions: Limb alert - right  Fall Risk: Standard fall risk    WEIGHT BEARING RESTRICTION  R Lower Extremity: Weight Bearing as Tolerated    PAIN ASSESSMENT  Ratin  Location: knee  Management Techniques: Activity promotion    ACTIVITY TOLERANCE  Pulse: 78        BP: 124/70             O2 SATURATIONS  Oxygen Therapy  SPO2% on Room Air at Rest: 93      ACTIVITIES OF DAILY LIVING ASSESSMENT  AM-PAC ‘6-Clicks’ Inpatient Daily Activity Short Form  How much help from another person does the patient currently need…  -   Putting on and taking off regular lower body clothing?: A Little  -   Bathing (including washing, rinsing, drying)?:  A Little  -   Toileting, which includes using toilet, bedpan or urinal? : A Little  -   Putting on and taking off regular upper body clothing?: None  -   Taking care of personal grooming such as brushing teeth?: None  -   Eating meals?: None    AM-PAC Score:  Score: 21  Approx Degree of Impairment: 32.79%  Standardized Score (AM-PAC Scale): 44.27  CMS Modifier (G-Code): CJ    FUNCTIONAL TRANSFER ASSESSMENT  Sit to Stand: Edge of Bed  Edge of Bed: Stand-by Assist  Toilet Transfer: Stand-by Assist    BED MOBILITY  Rolling: Independent  Supine to Sit : Independent  Sit to Supine (OT): Independent    BALANCE ASSESSMENT  Static Sitting: Supervision  Static Standing: Stand-by Assist    FUNCTIONAL ADL ASSESSMENT  Eating: Independent  Grooming Seated: Independent  Bathing Seated: Stand-by Assist  UB Dressing Seated: Stand-by Assist  LB Dressing Seated: Stand-by Assist  Toileting Seated: Stand-by Assist       Skilled Therapy Provided:       Therapeutic Activities: Prior to initiating activity,spent time educating patient on role of OT, activity pacing and promotion, WB status, and goals for session. Patient rcvd in bed; Educated on sequencing bed mobility and hand placement; completed transition from supine to EOB with independence. Once seated EOB , pt demonstrated good sitting balance; tolerated ~ 5 minutes  at EOB. STS completed to RW with SBA; pt was able to demonstrate ability to complete lateral weight shifting with SBA; Patient ambulatory >reasonable community distances iwht SBA.  Completes functional t/f with SBA; provided education on offloading  surgical leg by placing slightly forward for STS. Patient encouraged to allow rest after activity and standing rest breaks during dynamic tasks for improved activity tolerance.     ADL/Self Care Retraining:   Patient educated on return to home safety strategies and managing daily tasks while recovering from joint replacement; discussed initial 24 hour SPV is always  recommended post operatively. Encouraged pt to have family/friend in the home (distant SPV) when taking first shower to ensure having someone available if anything happened; pt is able to complete LE dressing with setup/SBA; educated on addressing operative leg when threading. Patient manging toileting tasks with distant supervision; all other self care at SBA level      Patient is on track to d/c home with  support; recommend initial 24hr SPV to assist as needed with ADLs and higher level IADLs. No further OT contact planned. If change in status occurs, please re-order OT services.           EDUCATION PROVIDED  Patient Education : Role of Occupational Therapy; Plan of Care; Discharge Recommendations; Functional Transfer Techniques; Fall Prevention; Weight Bear Status; Surgical Precautions; Posture/Positioning; Energy Conservation; Proper Body Mechanics  Patient's Response to Education: Verbalized Understanding    The patient's Approx Degree of Impairment: 32.79% has been calculated based on documentation in the [facility] '6 clicks' Inpatient Daily Activity Short Form.  Research supports that patients with this level of impairment may benefit from home with support; will continue with PT.  Final disposition will be made by interdisciplinary medical team.    Patient End of Session: Up in chair, Needs met, Call light within reach, RN aware of session/findings, All patient questions and concerns addressed      Patient Evaluation Complexity Level:   Occupational Profile/Medical History LOW - Brief history including review of medical or therapy records    Specific performance deficits impacting engagement in ADL/IADL LOW  1 - 3 performance deficits    Client Assessment/Performance Deficits LOW - No comorbidities nor modifications of tasks    Clinical Decision Making LOW - Analysis of occupational profile, problem-focused assessments, limited treatment options    Overall Complexity LOW     OT Session Time: 25  minutes  Self-Care Home Management: 10 minutes  Therapeutic Activity: 15 minutes\    Leonidas Quiroga, Occupational Therapist, OTR/L ext [phone]

## 2025-05-21 NOTE — CM/SW NOTE
Department  notified of request for HHC, aidin referrals started. Assigned CM/SW to follow up with pt/family on further discharge planning.     Leticia Hernandez, DSC

## 2025-05-21 NOTE — PROGRESS NOTES
[facility]  Anesthesiology Epidural Follow-up Note  2025    Patient name: Lee Sunshine 65 year old male  : 1960  MRN: [id number]    Diagnosis: [unfilled]    S/P: left knee    Pain treatment modality: Duramorph    Current hospital day: Hospital Day: 2    Pain Scores: 1    Current Medications:  Scheduled Meds:Scheduled Medications[1]  Continuous Infusions:Medication Infusions[2]  PRN Meds:.PRN Medications[3]    Anticoagulation:       Jean catheter:      Assessment:   No complcations    Plan:   Po meds as needed    MARIA G HEALY MD  Anesthesia Acute Pain Service [phone]                        [1]    sennosides  17.2 mg Oral Nightly    docusate sodium  100 mg Oral BID    aspirin  325 mg Oral BID    rosuvastatin  10 mg Oral Nightly    Sotalol HCl  160 mg Oral BID   [2]    lactated ringers 20 mL/hr at 25 1128   [3]   naloxone    acetaminophen    HYDROcodone-acetaminophen    HYDROcodone-acetaminophen    HYDROmorphone    HYDROmorphone    diphenhydrAMINE **OR** diphenhydrAMINE    nalbuphine    sodium chloride    polyethylene glycol (PEG 3350)    magnesium hydroxide    bisacodyl    fleet enema    metoclopramide    diphenhydrAMINE **OR** diphenhydrAMINE    oxyCODONE **OR** oxyCODONE    HYDROmorphone **OR** HYDROmorphone

## 2025-05-21 NOTE — DISCHARGE SUMMARY
[facility]  part of [facility]    Discharge Summary    Lee Sunhsine Patient Status:  Outpatient in a Bed    1960 MRN [id number]   Location [facility] 4W/SW/SE Attending Man Gill MD   Hosp Day # 0 PCP Etienne Christopher MD     Date of Admission: 2025 Disposition: Home or Self Care     Date of Discharge: 25    Admitting Diagnosis: Osteoarthritis right knee    Hospital Discharge Diagnoses: R knee OA s/p arthroplasty    Lace+ Score: 25  59-90 High Risk  29-58 Medium Risk  0-28   Low Risk.    TCM Follow-Up Recommendation:  LACE < 29: Low Risk of readmission after discharge from the hospital; Still recommend for TCM follow-up.    Problem List: Problem List[1]    Reason for Admission: Post-op    Physical Exam:   Vitals:    25 0850   BP: 124/70   Pulse: 78   Resp:    Temp:    GENERAL:  Awake and alert, in no acute distress.  HEART:  Regular rhythm.  Mild bradycardia  LUNGS:  Air entry was good.  No crackles or wheezes   ABDOMEN: Soft and non-tender.    MUSCULOSKELETAL:  R knee dressing in place  PSYCHIATRIC: Normal mood      History of Present Illness:   Per Dr. Awan  This is a 65 year oldmale with history of  knee osteoarthritis who underwent Right total knee arthroplasty. Patient was admitted post operatively for closer monitoring and care. At time of interview, patient reported pain was well controlled. Denied current nausea or vomiting. Patient otherwise denied CP, SOB, abd pain, F/C     Hospital Course:   Primary osteoarthritis of right knee  - s/p R knee arthroplasty  - pain controlled  - Encourage Incentive spirometry  - PT/OT has evaluated  - stable for dc     Paroxysmal Atrial Fibrillation  -cont home meds  -resume eliquis tonight    Hyperlipidemia  -Cont Statin    Consultations: ortho     Procedures: see above    Complications: none    Discharge Condition: Stable    Discharge Medications:      Discharge Medications        START taking these  medications        Instructions Prescription details   docusate sodium 100 MG Caps  Commonly known as: COLACE      Take 100 mg by mouth 2 (two) times daily.   Quantity: 20 capsule  Refills: 0     HYDROcodone-acetaminophen 7.5-325 MG Tabs  Commonly known as: Norco      Take 1 tablet by mouth every 6 (six) hours as needed.   Quantity: 40 tablet  Refills: 0            CONTINUE taking these medications        Instructions Prescription details   Eliquis 5 MG Tabs  Generic drug: apixaban      Take 1 tablet (5 mg total) by mouth in the morning and 1 tablet (5 mg total) before bedtime.   Refills: 0     metoprolol tartrate 50 MG Tabs  Commonly known as: Lopressor      Take 0.5 tablets (25 mg total) by mouth 2 (two) times daily.   Quantity: 180 tablet  Refills: 3     rosuvastatin 10 MG Tabs  Commonly known as: Crestor      Take 1 tablet (10 mg total) by mouth nightly.   Quantity: 90 tablet  Refills: 3     Sotalol HCl 160 MG Tabs  Commonly known as: BETAPACE      Take 1 tablet (160 mg total) by mouth in the morning and 1 tablet (160 mg total) before bedtime.   Refills: 0            STOP taking these medications      polyethylene glycol (PEG 3350-KCl-NaBcb-NaCl-NaSulf) 236 g Solr  Commonly known as: Golytely                  Where to Get Your Medications        These medications were sent to [id number] - [place], [place] - [facility] [address] AT [facility], [phone], [phone]  [address], [place] [address]      Phone: [phone]   docusate sodium 100 MG Caps  HYDROcodone-acetaminophen 7.5-325 MG Tabs         Greater than 35 minutes spent, >50% spent counseling re: treatment plan and workup     Man Gill MD  5/21/2025            [1]   Patient Active Problem List  Diagnosis    Paroxysmal atrial fibrillation (HCC)    ASD (atrial septal defect) (HCC)    Obesity due to excess calories    Hyperlipidemia    Long term current use of anticoagulant    Agatston coronary  artery calcium score between 100 and 199    Primary osteoarthritis of left knee

## 2025-05-21 NOTE — CM/SW NOTE
05/21/25 0900   Discharge disposition   Expected discharge disposition Home-Health   Post Acute Care Provider [facility]   Discharge transportation Private car     RN Karma confirmed pt likely cleared for DC today.    Liaison Flor w/ [facility] notified.    Pt is cleared from SW/CM stand point. RN is aware.    PLAN: Home w/ [facility]          BRITANY Hernandez, LSW [id number]

## 2025-05-21 NOTE — PROGRESS NOTES
[facility]  part of [facility]    Progress Note    Lee Sunshine Patient Status:  Outpatient in a Bed    1960 MRN [id number]   Location [facility] 4W/SW/SE Attending Thaddeus Steinberg, *   Hosp Day # 0 PCP Etienne Christopher MD       Subjective:   Lee Sunshine is a(n) 65 year old male recovering from a right total knee arthroplasty.  He is up in bed on his CPM machine.  His pain is controlled.    Objective:   Blood pressure 126/72, pulse 68, temperature 98.7 °F (37.1 °C), temperature source Oral, resp. rate 18, height 6' 4\" (1.93 m), weight 292 lb (132.5 kg), SpO2 94%.    General appearance: alert, appears stated age and cooperative  INCISION/WOUND: clean, dry and intact, dressing intact and in place and no evidence of infection  Extremities: No calf pain  Pulses: 2+ and symmetric  Neurologic: Grossly normal    Assessment and Plan:   Postop day 1 status post right total knee arthroplasty.  Discharge planning for home today if he passes therapy and is cleared medically.  Follow-up in the office as scheduled.      Results:     Lab Results   Component Value Date    WBC 6.8 2025    HGB 13.1 2025    HCT 39.0 2025    .0 2025    CREATSERUM 0.92 2025    BUN 13 2025     2025    K 4.3 2025     2025    CO2 26.0 2025     (H) 2025    CA 9.7 2025    ALB 5.0 (H) 2025    ALKPHO 56 2025    BILT 1.0 2025    TP 7.5 2025    AST 33 2025    ALT 49 2025    PTT 31.5 2025    INR 1.00 2025    T4F 0.84 2019    TSH 1.745 2025    GARFIELD 41 2017    LIP 27 2017    PSA 0.4 2018    DDIMER <0.27 2017    ESRML 5 2019    CRP <0.5 2019    MG 2.3 2018    TROP 0.00 2017    TROP 0.00 2017    B12 488 04/10/2021       XR KNEE (1 OR 2 VIEWS), RIGHT (CPT=73560)  Result Date: 2025  CONCLUSION: Expected  postsurgical changes status post recent right knee arthroplasty.    Dictated by (CST): Markel Dolan MD on 5/20/2025 at 2:36 PM     Finalized by (CST): Markel Dolan MD on 5/20/2025 at 2:37 PM                  ARIADNE KERR PA-C  5/21/2025

## 2025-05-21 NOTE — PHYSICAL THERAPY NOTE
PHYSICAL THERAPY EVALUATION - INPATIENT     Room Number: 407/407-A  Evaluation Date: 2025  Type of Evaluation: Initial   Physician Order: PT Eval and Treat    Presenting Problem: R TKA  Co-Morbidities : L TKA   Reason for Therapy: Mobility Dysfunction and Discharge Planning    PHYSICAL THERAPY ASSESSMENT   Patient is a 65 year old male admitted 2025 for R TKA.  Prior to admission, patient's baseline is independent.  Patient is currently functioning near baseline with bed mobility, transfers, gait, stair negotiation, maintaining seated position, standing prolonged periods, and performing household tasks.    Based on patient demonstrating good understanding of post operative protocol and safe functional mobility observed this date, no further skilled IP PT required at this time. RN made aware. Patient  agreeable. Safe for DC to home from a PT standpoint.      PLAN  Patient has been evaluated and presents with no skilled Physical Therapy needs at this time.  Patient will be discharged from Physical Therapy services. Please re-order if a new functional limitation presents during this admission.       PHYSICAL THERAPY MEDICAL/SOCIAL HISTORY     Problem List  Active Problems:    S/P total knee arthroplasty, right      HOME SITUATION  Type of Home: House  Home Layout: One level  Stairs to Enter : 1        Stairs to Bedroom: 5    Railing: Yes    Lives With: Spouse    Drives: Yes   Patient Regularly Uses: Rolling walker      SUBJECTIVE  \"They are sending me home with a ROM exercise machine\"     PHYSICAL THERAPY EXAMINATION   OBJECTIVE  Precautions: Limb alert - right  Fall Risk: Standard fall risk    WEIGHT BEARING RESTRICTION  R Lower Extremity: Weight Bearing as Tolerated    PAIN ASSESSMENT  Ratin  Location: R knee  Management Techniques: Activity promotion, Body mechanics, Repositioning    COGNITION  Overall Cognitive Status:  WFL - within functional limits    RANGE OF MOTION AND STRENGTH  ASSESSMENT  Upper extremity ROM and strength are within functional limits   Lower extremity ROM is impaired R knee 0-80*  Lower extremity strength is impaired R knee flexion/extension: 3/5    BALANCE  Static Sitting: Normal  Dynamic Sitting: Normal  Static Standing: Good  Dynamic Standing: Fair +        ACTIVITY TOLERANCE  Pulse: 78 (117 bpm with activity)  Heart Rate Source: Monitor     BP: 124/70 (standin/94mmHg)  BP Location: Right arm  BP Method: Automatic  Patient Position: Sitting    O2 WALK  Oxygen Therapy  SPO2% on Room Air at Rest: 93    AM-PAC '6-Clicks' INPATIENT SHORT FORM - BASIC MOBILITY  How much difficulty does the patient currently have...  Patient Difficulty: Turning over in bed (including adjusting bedclothes, sheets and blankets)?: None   Patient Difficulty: Sitting down on and standing up from a chair with arms (e.g., wheelchair, bedside commode, etc.): None   Patient Difficulty: Moving from lying on back to sitting on the side of the bed?: None   How much help from another person does the patient currently need...   Help from Another: Moving to and from a bed to a chair (including a wheelchair)?: A Little   Help from Another: Need to walk in hospital room?: A Little   Help from Another: Climbing 3-5 steps with a railing?: A Little     AM-PAC Score:  Raw Score: 21   Approx Degree of Impairment: 28.97%   Standardized Score (AM-PAC Scale): 50.25   CMS Modifier (G-Code): CJ    FUNCTIONAL ABILITY STATUS  Functional Mobility/Gait Assessment  Gait Assistance: Supervision  Distance (ft): 150ft x 2  Assistive Device: Rolling walker  Pattern: R Decreased stance time (decreased mariya speed, initial step-to gait improving to step-through as distance increased)  Stairs: Stairs  How Many Stairs: 5  Device: 1 Rail  Assist: Contact guard assist  Pattern: Ascend and Descend  Ascend and Descend : Step to  Supine to Sit: independent  Sit to Stand: modified independent with use of RW. Initial cues for  appropriate UE positioning with transitional movements. Cues for pacing upon standing to allow body time to acclimate to change in position.     Exercise/Education Provided:  Bed mobility  Body mechanics  Energy conservation  Functional activity tolerated  Gait training  Posture  ROM  Strengthening  Lower therapeutic exercise:  Ankle pumps  Heel slides  LAQ  Quad sets  Seated knee flexion  Transfer training    Skilled Therapy Provided: Patient received supine in bed. RN approved activity. Coordinated session with OT. Therapist educated pt on POC and physiological benefits of mobilization. Reviewed post operative TKA protocol, weight bearing status (WBAT) and there ex (provided with handout to summarize). Patient agreeable to participate. Primary complaint is post operative knee pain which he rates at 2 out of 10 per pain scale.     The patient's Approx Degree of Impairment: 28.97% has been calculated based on documentation in the [facility] '6 clicks' Inpatient Basic Mobility Short Form.  Research supports that patients with this level of impairment may benefit from home with family support and HHPT.  Final disposition will be made by interdisciplinary medical team.    Patient End of Session: Up in chair, Needs met, Call light within reach, RN aware of session/findings, Ice applied    Patient was able to achieve the following ...   Patient able to transfer  At previous, functional level  Safely and independently    Patient able to ambulate on level surfaces  Supervision with [facility] safe for home [place]     Patient Evaluation Complexity Level:  History Low - no personal factors and/or co-morbidities   Examination of body systems Low -  addressing 1-2 elements   Clinical Presentation Low- Stable   Clinical Decision Making  Low Complexity     Gait Training: 15 minutes  Therapeutic Activity:  15 minutes

## 2025-05-21 NOTE — DISCHARGE INSTRUCTIONS
Keep incision dry for 2 weeks.  Change Mepilex dressing every 3-5 days.  Ambulate with walker and assist.    Home Health: Sometimes managing your health at home requires assistance.  The [facility] team has recognized your preference to use [facility].  They can be reached by phone at [phone].  The fax number for your reference is [phone].  A representative from the home health agency will contact you or your family to schedule your first visit.

## 2025-05-21 NOTE — OPERATIVE REPORT
[facility]    PATIENT'S NAME: MARLEN OSWALD   ATTENDING PHYSICIAN: Thaddeus Steinberg MD   OPERATING PHYSICIAN: Thaddeus Steinberg MD   PATIENT ACCOUNT#:   [id number]    LOCATION:  [address] [place] [address]  MEDICAL RECORD #:   [id number]       YOB: 1960  ADMISSION DATE:       05/20/2025      OPERATION DATE:  05/20/2025    OPERATIVE REPORT      PREOPERATIVE DIAGNOSIS:  Osteoarthritis of right knee.  POSTOPERATIVE DIAGNOSIS:  Osteoarthritis of right knee.   PROCEDURE PERFORMED:  Right total knee arthroplasty with Medacta MRI navigated patient specific instruments.    ASSISTANTS:  Js Ness RSA and Anuja Mckinnon RSA.    COMPONENTS USED:  Medacta Sphere size 5+ femur, 5 tibia, 4 patella, 10 mm spacer.    ESTIMATED BLOOD LOSS:  50 mL.    COMPLICATIONS:  No intraoperative complications.    INDICATIONS:  The patient is a 65-year-old male patient with advanced arthritis of the right knee.  It has not responded to appropriate conservative management.  After discussion of the options for treatment, the patient requested the above procedure.  Our discussion included alternative treatments, and also included, but was not limited to, the potential risk of anesthetic complications, infection, DVT or PE, bleeding complications, periprosthetic fracture or extensor mechanism failure, potential need for revision surgery, nerve or vascular injury, unanticipated perioperative orthopedic or medical complications, etc.  Written consent was obtained.    OPERATIVE TECHNIQUE:  The patient was brought to the operating room and given appropriate anesthesia.  Prior to making an incision, a time out was performed by the surgical team.  A tourniquet was placed, and the knee was prepped and draped in the usual sterile fashion.  After exsanguination with an Esmarch bandage, the tourniquet was inflated with the knee fully flexed.  A longitudinal incision was made from just superior to the superomedial corner of the  patella to the tibial tubercle.  A midvastus approach to the femur was used.  The patellar cut was made first, and a patella protector was placed over the cut surface of the patella which was then subluxed into the lateral gutter.  The anterior and posterior cruciate ligaments were excised, and the femoral template was placed over the distal femur and seated well.  It was pinned anteriorly, and the distal reference holes were drilled.  The distal femoral cutting guide was then attached to the anterior pins, and the distal femoral cut was made.  The thickness of the distal femoral bone resection was measured and compared to the templated values.  The pins were then translated into the anterior reference holes, and the distal femoral cutting guide was applied.  The distal femoral guide was centered appropriately, pinned in place, and the distal femoral finishing cuts were made.  Next, the meniscal remnants were excised, and the tibial guide was seated.  The extramedullary tibial alignment guide was applied, and alignment was checked.  The tibial template was then pinned into place and exchanged for the tibial cutting guide.  Alignment of the cutting guide was rechecked with the extramedullary alignment guide.  Hohmann retractor was placed behind the tibia to protect the neurovascular structures, and Hohmann retractors were placed medially and laterally to protect the collateral ligaments and the patellar tendon.  The tibial cut was made and the tibial bone resection was taken and compared to the templated values.  Overall alignment was then checked with the extramedullary alignment guide. The spacer block was used, and the overall alignment, flexion and extension gaps were checked and were excellent.  The guide was used to size the patella.  The holes were drilled for the patella.  The trial components were then inserted.  The knee came to full extension and balanced well with varus and valgus stress with symmetrical  flexion and extension gaps with the spacer.  The tibial guide was then pinned into place and preparations were made for the stemmed portion of the tibial component.  The trochlear recess was cut for the femur.  The trial components were then removed, and the bone was prepared with pulsatile lavage.  All three components were cemented in place using contemporary technique.  Excess cement was removed, and trials again were performed.  Stability and alignment were the same as with the provisional components.  The actual polyethylene spacer was locked into place in the tibial tray.  The tourniquet was deflated and hemostasis was achieved.  The wound was closed in routine fashion.  Sponge and instrument counts were correct at the end of the procedure.  Estimated blood loss was 50 mL.  Routine specimens were sent to Pathology.  There were no complications.  The patient was stable under the care of the anesthesiologist at the completion of the procedure.     The use of an assistant was essential in this case to hold retractors and protect vital structures while I performed bone cuts with a saw.    Dictated By Thaddeus Steinberg MD  d: 05/20/2025 13:21:57  t: 05/20/2025 15:28:43  Job [id number]  [facility]/

## 2025-05-21 NOTE — CONSULTS
[facility]  part of [facility]  Consult Note       Lee Sunshine Patient Status:  Outpatient in a Bed    1960  65 year old CSN [id number]   Location 407/407-A Attending Thaddeus Steinberg, *     PCP Etienne Christopher MD         DATE OF ADMISSION: 2025     CHIEF COMPLAINT: Post op    HISTORY OF PRESENT ILLNESS  This is a 65 year oldmale with history of  knee osteoarthritis who underwent Right total knee arthroplasty. Patient was admitted post operatively for closer monitoring and care. At time of interview, patient reported pain was well controlled. Denied current nausea or vomiting. Patient otherwise denied CP, SOB, abd pain, F/C    PAST MEDICAL HISTORY  Past Medical History[1]     PAST SURGICAL HISTORY  Past Surgical History[2]    ALLERGIES   Patient has no known allergies.    MEDICATIONS  Current Discharge Medication List        CONTINUE these medications which have NOT CHANGED    Details   Sotalol HCl 160 MG Oral Tab Take 1 tablet (160 mg total) by mouth in the morning and 1 tablet (160 mg total) before bedtime.      metoprolol tartrate 50 MG Oral Tab Take 0.5 tablets (25 mg total) by mouth 2 (two) times daily.  Qty: 180 tablet, Refills: 3      apixaban (ELIQUIS) 5 MG Oral Tab Take 1 tablet (5 mg total) by mouth in the morning and 1 tablet (5 mg total) before bedtime.      rosuvastatin 10 MG Oral Tab Take 1 tablet (10 mg total) by mouth nightly.  Qty: 90 tablet, Refills: 3           STOP taking these medications       polyethylene glycol, PEG 3350-KCl-NaBcb-NaCl-NaSulf, 236 g Oral Recon Soln Comments:   Reason for Stopping:               SOCIAL HISTORY  Social Hx on file[3]    FAMILY HISTORY  Family History[4]    PHYSICAL EXAM  Vital signs:  /77 (BP Location: Right arm)   Pulse 52   Temp 97.4 °F (36.3 °C) (Temporal)   Resp 16   Ht 6' 4\" (1.93 m)   Wt 292 lb (132.5 kg)   SpO2 96%   BMI 35.54 kg/m²      GENERAL:  Awake and alert, in no acute distress.  HEART:  Regular  rhythm.  Mild bradycardia  LUNGS:  Air entry was good.  No crackles or wheezes   ABDOMEN: Soft and non-tender.    MUSCULOSKELETAL:  R knee dressing in place  PSYCHIATRIC: Normal mood      IMAGING  XR KNEE (1 OR 2 VIEWS), RIGHT (CPT=73560)  Result Date: 5/20/2025  CONCLUSION: Expected postsurgical changes status post recent right knee arthroplasty.    Dictated by (CST): Markel Dolan MD on 5/20/2025 at 2:36 PM     Finalized by (CST): Markel Dolan MD on 5/20/2025 at 2:37 PM            Data:  No results for input(s): \"RBC\", \"HGB\", \"HCT\", \"MCV\", \"MCH\", \"MCHC\", \"RDW\", \"NEPRELIM\", \"WBC\", \"PLT\" in the last 168 hours.  No results for input(s): \"GLU\", \"BUN\", \"CREATSERUM\", \"GFRAA\", \"GFRNAA\", \"CA\", \"ALB\", \"NA\", \"K\", \"CL\", \"CO2\", \"ALKPHO\", \"AST\", \"ALT\", \"BILT\", \"TP\" in the last 168 hours.    ASSESSMENT/PLAN    Primary osteoarthritis of right knee  - s/p R knee arthroplasty  - cont pain control, close monitoring with IV narcotics  - Encourage Incentive spirometry  - PT/OT per Ortho  - F/u Ortho recs    Paroxysmal Atrial Fibrillation  -Rates currently low  -Holding metoprolol   -Sotalol ordered with holding parameters  -Restart anticoagulation with Eliquis when cleared by surgery  -Telemetry monitoring    Hyperlipidemia  -Statin      Plan of care discussed with patient at bedside.  Discussed with  RN.      Dilan Awan MD    This note was prepared using Dragon Medical voice recognition dictation software. As a result errors may occur. When identified these errors have been corrected. While every attempt is made to correct errors during dictation discrepancies may still exist         [1]   Past Medical History:   Agatston coronary artery calcium score between 100 and 199    Arrhythmia    A_Fib intermittently    ASD (atrial septal defect) (HCC)    Coronary atherosclerosis    High cholesterol    Obesity due to excess calories    Osteoarthritis    Paroxysmal atrial fibrillation (HCC)   [2]   Past Surgical  History:  Procedure Laterality Date    Ablate arrhythmia add on  9/25/2023    Cholecystectomy      Colonoscopy N/A 1/25/2019    Procedure: COLONOSCOPY;  Surgeon: Miguel Angel Roche MD;  Location: [facility] ENDOSCOPY    Colonoscopy      Colonoscopy N/A 5/27/2022    Procedure: COLONOSCOPY;  Surgeon: Miguel Angel Roche MD;  Location: [facility] ENDOSCOPY    Ep cardioversion 1x  1/9/2018, April 2017         Ep cardioversion 1x  2/16/2022         Ep cardioversion 1x  11/16/2022         Ep cardioversion 1x  10/4/2024    Ep cardioversion 1x  4/9/2025    Laparoscopic cholecystectomy      Repr asd ostium premum  1990 approx    Stim/pacing heart post iv drug infu  9/25/2023    Tx atrial fib pulm vein isol  9/25/2023   [3]   Social History  Socioeconomic History    Marital status:    Tobacco Use    Smoking status: Never    Smokeless tobacco: Never   Vaping Use    Vaping status: Never Used   Substance and Sexual Activity    Alcohol use: Not Currently     Alcohol/week: 3.0 - 4.0 standard drinks of alcohol     Types: 1 - 2 Cans of beer, 2 Standard drinks or equivalent per week    Drug use: No    Sexual activity: Yes     Partners: Female   Other Topics Concern    Caffeine Concern No    Exercise No   [4]   Family History  Problem Relation Age of Onset    Heart Disorder Mother 35        Heart attack    Cancer Father

## 2025-05-21 NOTE — CM/SW NOTE
05/21/25 0900   CM/SW Referral Data   Referral Source Physician   Reason for Referral Protocol order set   Specify order set Other  (surgery/post surgical)   Informant Patient   Medical Hx   Does patient have an established PCP? Yes  (Etienne Christopher)   Patient Info   Patient's Current Mental Status at Time of Assessment Alert;Oriented   Patient's Home Environment House   Number of Levels in Home 3   Number of Stair in Home 6 to bedroom, 1 to enter   Patient lives with Spouse/Significant other   Patient Status Prior to Admission   Independent with ADLs and Mobility Yes   Services in place prior to admission DME/Supplies at home   Type of DME/Supplies Straight Cane;Standard Walker  (does not use)   Discharge Needs   Anticipated D/C needs Home health care   Choice of Post-Acute Provider   Informed patient of right to choose their preferred provider Yes   List of appropriate post-acute services provided to patient/family with quality data No - Declined list   Patient/family choice [facility]   Information given to Patient     Received MDO for Surgery/Post Surgical. Confirmed w/ RN - Anticipated therapy need: Home with Home Healthcare. SHANTANU Kelly sent HH referrals this AM. F2F entered/sent.    SW met w/ pt at bedside. Above assessment completed.  Verified pt's home address and confirmed he lives w/ his wife.    Pt confirmed having a home w/ 3 levels. There are approx 6 stairs up to the bedroom, 5 steps downstairs, and 1 step to enter the home. Pt reports stairs have railings on both sides.    Pt confirmed having a cane and walker at home from a previous surgery. Pt does not use assistive devices for daily ambulation.    Pt confirmed hx w/ HH services in 2020 post knee surgery. Pt confirmed he is agreeable to HH again.  Discussed pt's hx w/ [facility]. Offered list of HH providers. Pt declined. Confirmed choice is [facility] due to hx and surgeon preference.    [facility] reserved in [place]. Liaison  Flor notified of choice and likely DC today. HH instructions added to pt's AVS.      PLAN: Home w/ [facility]        BRITANY Hernandez, LSW [id number]

## 2025-05-21 NOTE — PLAN OF CARE
Pt is A&O x4. Breathing on room air. Remote tele in place. Voiding via Jean, will D.C. in the morning. Given Norco prn for pain. Call light within reach. Safety precaution in place.    Problem: Patient Centered Care  Goal: Patient preferences are identified and integrated in the patient's plan of care  Description: Interventions:  - What would you like us to know as we care for you?   - Provide timely, complete, and accurate information to patient/family  - Incorporate patient and family knowledge, values, beliefs, and cultural backgrounds into the planning and delivery of care  - Encourage patient/family to participate in care and decision-making at the level they choose  - Honor patient and family perspectives and choices  Outcome: Progressing     Problem: Patient/Family Goals  Goal: Patient/Family Long Term Goal  Description: Patient's Long Term Goal: able to perform daily living activities with mild pain     Interventions:  - pain management  - PT  - See additional Care Plan goals for specific interventions  Outcome: Progressing  Goal: Patient/Family Short Term Goal  Description: Patient's Short Term Goal: return home    Interventions:   - Follows plan of care  - Monitor labs and vitals  - Pain management  -Discharging planning  - See additional Care Plan goals for specific interventions  Outcome: Progressing     Problem: PAIN - ADULT  Goal: Verbalizes/displays adequate comfort level or patient's stated pain goal  Description: INTERVENTIONS:  - Encourage pt to monitor pain and request assistance  - Assess pain using appropriate pain scale  - Administer analgesics based on type and severity of pain and evaluate response  - Implement non-pharmacological measures as appropriate and evaluate response  - Consider cultural and social influences on pain and pain management  - Manage/alleviate anxiety  - Utilize distraction and/or relaxation techniques  - Monitor for opioid side effects  - Notify MD/LIP if  interventions unsuccessful or patient reports new pain  - Anticipate increased pain with activity and pre-medicate as appropriate  Outcome: Progressing     Problem: RISK FOR INFECTION - ADULT  Goal: Absence of fever/infection during anticipated neutropenic period  Description: INTERVENTIONS  - Monitor WBC  - Administer growth factors as ordered  - Implement neutropenic guidelines  Outcome: Progressing     Problem: SAFETY ADULT - FALL  Goal: Free from fall injury  Description: INTERVENTIONS:  - Assess pt frequently for physical needs  - Identify cognitive and physical deficits and behaviors that affect risk of falls.  - Moss fall precautions as indicated by assessment.  - Educate pt/family on patient safety including physical limitations  - Instruct pt to call for assistance with activity based on assessment  - Modify environment to reduce risk of injury  - Provide assistive devices as appropriate  - Consider OT/PT consult to assist with strengthening/mobility  - Encourage toileting schedule  Outcome: Progressing     Problem: DISCHARGE PLANNING  Goal: Discharge to home or other facility with appropriate resources  Description: INTERVENTIONS:  - Identify barriers to discharge w/pt and caregiver  - Include patient/family/discharge partner in discharge planning  - Arrange for needed discharge resources and transportation as appropriate  - Identify discharge learning needs (meds, wound care, etc)  - Arrange for interpreters to assist at discharge as needed  - Consider post-discharge preferences of patient/family/discharge partner  - Complete POLST form as appropriate  - Assess patient's ability to be responsible for managing their own health  - Refer to Case Management Department for coordinating discharge planning if the patient needs post-hospital services based on physician/LIP order or complex needs related to functional status, cognitive ability or social support system  Outcome: Progressing

## 2025-05-22 NOTE — PROGRESS NOTES
Lee Sunshine is a 65 year old male.  Chief Complaint   Patient presents with    Pre-Op Exam     Dr. Steinberg 5/6 right total knee replacement     HPI:     Lee Sunshine presents for preoperative clearance for: Right total knee arthroplasty   Performing Physician: Dr Steinberg   Date of surgery: 5/6/2025  Elective or emergency: elective   Pre-operative forms provided: non    Current complaints:   Right knee pain    Active medical problems:   AFIB  Hyperlipidemia  Obesity      Cardiac:  - History of ischemic coronary disease: Yes - ASD repair   - History of heart failure: No   - Heart attack in past 90 days: No  - Chest pain in last 90 days: No  - History of Arrhythmia:  Yes - AFIB  - History of stroke or TIA: No  - Diabetes/A1C: Last A1c value was 5.6% done 5/28/2021.      - Most recent echo/Stress test: He had a stress test yesterday with Dr Jones results pending   LOV with cardiology 4/3/2025  - Cardiac Medications: sotalol, metoprolol and eliquis     Pulmonary:   - Smoker: No   - Apnea/CPAP: No   - Asthma/COPD: No   - Difficulty laying flat for extended period of time: No   - Dyspnea/exertional: No   - Respiratory Medications: No    Functional Capacity:   Perform ADLs- eating, dress, toilet (1 METs)? Yes   Walk up flight of steps, hill, walk ground level 3-4mph (4 METs)? Yes    Perform heavy housework- scrubbing floors, move heavy furniture, climb 2 flights of stairs (4-10 METs)? Yes he able to mow the lawn and shovel the snow.   Participate in strenuous sports- swimming, singles tennis, football, basketball, ski (+10 METs)? No            Current Medications[1]   Past Medical History[2]   Past Surgical History[3]   Social History:  Short Social Hx on File[4]   Family History[5]   Allergies[6]     REVIEW OF SYSTEMS:     Review of Systems   Constitutional:  Negative for fever.   HENT: Negative.     Respiratory:  Negative for cough, shortness of breath and wheezing.    Cardiovascular:  Negative for chest pain.    Gastrointestinal:  Negative for abdominal pain.   Genitourinary: Negative.    Musculoskeletal:  Positive for arthralgias (right knee).   Skin: Negative.    Neurological: Negative.    Psychiatric/Behavioral: Negative.        Wt Readings from Last 5 Encounters:   04/23/25 293 lb 6.4 oz (133.1 kg)   04/03/25 291 lb (132 kg)   03/12/25 294 lb (133.4 kg)   02/25/25 295 lb (133.8 kg)   09/26/24 276 lb (125.2 kg)     Body mass index is 35.71 kg/m².      EXAM:   /75 (BP Location: Right arm, Patient Position: Sitting, Cuff Size: large)   Pulse 70   Temp 98.1 °F (36.7 °C) (Oral)   Ht 6' 4\" (1.93 m)   Wt 293 lb 6.4 oz (133.1 kg)   SpO2 97%   BMI 35.71 kg/m²     Physical Exam  Vitals reviewed.   Constitutional:       Appearance: Normal appearance.   HENT:      Head: Normocephalic.   Cardiovascular:      Rate and Rhythm: Normal rate and regular rhythm.      Pulses: Normal pulses.   Pulmonary:      Breath sounds: Normal breath sounds. No wheezing.   Musculoskeletal:         General: No swelling. Normal range of motion.   Skin:     General: Skin is warm and dry.   Neurological:      Mental Status: He is alert and oriented to person, place, and time.   Psychiatric:         Mood and Affect: Mood normal.         Behavior: Behavior normal.            ASSESSMENT AND PLAN:   1. Preoperative clearance  - surgery clearance pending lab results and cardiac clearance   - Urinalysis with Culture Reflex [E]; Future  - CBC W Differential W Platelet [E]; Future  - Comp Metabolic Panel (14) [E]; Future  - Prothrombin Time (PT) [E]; Future  - PTT, Activated [E]; Future  - MRSA/SA Scrn by PCR; Future  - XR CHEST PA + LAT CHEST (CPT=71046); Future  - Hemoglobin A1C [E]; Future    2. Paroxysmal atrial fibrillation (HCC)  - continue eliquis  - following with cardiology   - cardioversion performed on 4/9 with Dr Jones    3. ASD (atrial septal defect) ([facility])  - following with cardiology     4. Long term current use of anticoagulant  -  secondary to #2    5. Mixed hyperlipidemia  - continue crestor as prescribed  - lipid panel 2/2025 WNL      6. Class 2 obesity due to excess calories with body mass index (BMI) of 35.0 to 35.9 in adult, unspecified whether serious comorbidity present  - monitor diet and exercise as tolerated. He is hoping to be more active after having knee surgery.       The patient indicates understanding of these issues and agrees to the plan.         [1]   Current Outpatient Medications   Medication Sig Dispense Refill    Sotalol HCl 160 MG Oral Tab Take 1 tablet (160 mg total) by mouth in the morning and 1 tablet (160 mg total) before bedtime.      metoprolol tartrate 50 MG Oral Tab Take 0.5 tablets (25 mg total) by mouth 2 (two) times daily. 180 tablet 3    apixaban (ELIQUIS) 5 MG Oral Tab Take 1 tablet (5 mg total) by mouth in the morning and 1 tablet (5 mg total) before bedtime.      rosuvastatin 10 MG Oral Tab Take 1 tablet (10 mg total) by mouth nightly. 90 tablet 3   [2]   Past Medical History:   Agatston coronary artery calcium score between 100 and 199    Arrhythmia    A_Fib intermittently    ASD (atrial septal defect) (HCC)    Coronary atherosclerosis    High cholesterol    Obesity due to excess calories    Paroxysmal atrial fibrillation (HCC)   [3]   Past Surgical History:  Procedure Laterality Date    Ablate arrhythmia add on  9/25/2023    Cholecystectomy      Colonoscopy N/A 1/25/2019    Procedure: COLONOSCOPY;  Surgeon: Miguel Angel Roche MD;  Location: [facility] ENDOSCOPY    Colonoscopy      Colonoscopy N/A 5/27/2022    Procedure: COLONOSCOPY;  Surgeon: Miguel Angel Roche MD;  Location: [facility] ENDOSCOPY    Ep cardioversion 1x  1/9/2018, April 2017         Ep cardioversion 1x  2/16/2022         Ep cardioversion 1x  11/16/2022         Ep cardioversion 1x  10/4/2024    Ep cardioversion 1x  4/9/2025    Laparoscopic cholecystectomy      Repr asd ostium premum  1990 approx    Stim/pacing heart post iv drug infu  9/25/2023    Tx  atrial fib pulm vein isol  9/25/2023   [4]   Social History  Socioeconomic History    Marital status:    Tobacco Use    Smoking status: Never    Smokeless tobacco: Never   Vaping Use    Vaping status: Never Used   Substance and Sexual Activity    Alcohol use: Yes     Alcohol/week: 3.0 - 4.0 standard drinks of alcohol     Types: 2 Standard drinks or equivalent, 1 - 2 Cans of beer per week    Drug use: No    Sexual activity: Yes     Partners: Female   Other Topics Concern    Caffeine Concern No    Exercise No     Social Drivers of Health     Food Insecurity: No Food Insecurity (2/25/2025)    NCSS - Food Insecurity     Worried About Running Out of Food in the Last Year: No     Ran Out of Food in the Last Year: No   Transportation Needs: No Transportation Needs (2/25/2025)    NCSS - Transportation     Lack of Transportation: No   Housing Stability: Not At Risk (2/25/2025)    NCSS - Housing/Utilities     Has Housing: Yes     Worried About Losing Housing: No     Unable to Get Utilities: No   [5]   Family History  Problem Relation Age of Onset    Heart Disorder Mother 35        Heart attack    Cancer Father    [6] No Known Allergies     [FreeTextEntry1] : I reviewed for  this patient clinical status, labs and relevant notes from other providers I also saw the patient and took a full history, completed an exam and discussed the treatment/management and follow up together with the patients parents

## 2025-05-27 NOTE — PROGRESS NOTES
The patient's chart has been reviewed. Okay to schedule pt for colonoscopy r/t screening for colon cancer with Dr. Elise Mandujano. Advise MAC sedation due to BMI > 30/medical hx with split dose Colyte or equivalent (eRx) preparation.      May continue all medica Adequate: hears normal conversation without difficulty

## 2025-06-11 ENCOUNTER — TELEPHONE (OUTPATIENT)
Dept: PHYSICAL THERAPY | Facility: HOSPITAL | Age: 65
End: 2025-06-11

## 2025-06-11 ENCOUNTER — ORDER TRANSCRIPTION (OUTPATIENT)
Dept: PHYSICAL THERAPY | Facility: HOSPITAL | Age: 65
End: 2025-06-11

## 2025-06-11 DIAGNOSIS — Z96.651 STATUS POST TOTAL RIGHT KNEE REPLACEMENT: Primary | ICD-10-CM

## 2025-06-17 ENCOUNTER — OFFICE VISIT (OUTPATIENT)
Dept: PHYSICAL THERAPY | Age: 65
End: 2025-06-17
Payer: MEDICARE

## 2025-06-17 DIAGNOSIS — Z96.651 STATUS POST TOTAL RIGHT KNEE REPLACEMENT: Primary | ICD-10-CM

## 2025-06-17 PROCEDURE — 97161 PT EVAL LOW COMPLEX 20 MIN: CPT

## 2025-06-17 PROCEDURE — 97110 THERAPEUTIC EXERCISES: CPT

## 2025-06-17 NOTE — PROGRESS NOTES
LOWER EXTREMITY EVALUATION:     Diagnosis:   Status post total right knee replacement (Z96.651), Right knee pain Patient:  Lee Sunshine (65 year old, male)        Referring Provider: LAURENCE Jolley    Today's Date   6/17/2025    Precautions:  None   Date of Evaluation: 06/17/25  Next MD visit: none scheduled  Date of Surgery: 5/20/2025     PATIENT SUMMARY     Summary of chief complaints: RIght knee pain  History of current condition: Pt reports that he had Home PT after surgery and was discharged about 2 weeks ago.  He has been using his ROM bicycle 3x/day and doing his PT HEP 1-2x/day.  He is having difficulty sleeping due to knee pain and stiffness.  He is taking Tylenol for pain and has been icing his knee.   Pain level: current 0 /10, at best 0 /10, at worst 4 /10  Description of symptoms: Right knee pain, stiffness, difficulty walking   Occupation: Retired   Leisure activities/Hobbies: Gold, Yardwork   Prior level of function: Was having pain with ADLs, housework and yardwork  Current limitations: Negotiating stairs with step to pattern, ambulating with 1 crutch, sleeping  Pt goals: Improve his walking, improve his knee ROM and decrease knee pain  Past medical history was reviewed with Lee.  Significant findings include: Left TKA 2020, A-fib  Imaging/Tests:     Lee  has a past medical history of Agatston coronary artery calcium score between 100 and 199 (08/20/2020), Arrhythmia, ASD (atrial septal defect) (HCC), Coronary atherosclerosis, High cholesterol, Obesity due to excess calories (12/21/2017), Osteoarthritis, and Paroxysmal atrial fibrillation (HCC) (04/26/2017).  He  has a past surgical history that includes Laparoscopic cholecystectomy; ep cardioversion 1x (1/9/2018, April 2017); repr asd ostium premum (1990 approx); cholecystectomy; colonoscopy (N/A, 1/25/2019); colonoscopy; ep cardioversion 1x (2/16/2022); colonoscopy (N/A, 5/27/2022); ep cardioversion 1x (11/16/2022); stim/pacing  heart post iv drug infu (9/25/2023); ablate arrhythmia add on (9/25/2023); tx atrial fib pulm vein isol (9/25/2023); ep cardioversion 1x (10/4/2024); and ep cardioversion 1x (4/9/2025).    ASSESSMENT  Lee presents to physical therapy evaluation with primary c/o RIght knee pain. The results of the objective tests and measures show decreased right knee flexion ROM, decreased right knee strength, gait and balance deficits. Functional deficits include but are not limited to Negotiating stairs with step to pattern, ambulating with 1 crutch, sleeping. Signs and symptoms are consistent with diagnosis of Status post total right knee replacement (Z96.651), Right knee pain. Pt and PT discussed evaluation findings, pathology, POC and HEP.  Pt voiced understanding and performs HEP correctly without reported pain. Skilled Physical Therapy is medically necessary to address the above impairments and reach functional goals.    OBJECTIVE:      Musculoskeletal  Observation: ambulating with 1 crutch with Right UE; incision healing well  Palpation: No tenderness at med or lat knee joint     Edema: Circumference at knee joint: R 48 cm; L43 cm   Special Tests:SLR ext lag: 10 deg     ROM and Strength: (* denotes performed with pain)  Knee   ROM MMT (-/5)    R L R L     Flex 80 AROM (85 PROM) 130 4 5     Ext (L3) 0 0 4 5       Flexibility:    LE Flexibility R L     Hamstrings min restricted not tested       Balance and Functional Mobility:  Gait: pt ambulates on level ground with assistive device; decreased step length; decreased stance phase   Tandem Stance: R back: 10 sec; L back: 4 sec  SLS: R 0 sec,  L 1 sec  Functional Mobility:  Sit to stand: -- (needs UE assist)   Stairs:Unable up or down using right LE     Today's Treatment and Response:   Pt education was provided on exam findings, treatment diagnosis, treatment plan, expectations, and prognosis.    Today's Treatment       6/17/2025   LE Treatment   Therapeutic Exercise Reviewed  current HEP-heel slides, SAQ, supine hamstring stretch with strap  Heel slides with strap 10x R  Quad sets 5\" 10x R  SLR 10x R  Flexion stretch at stairs 5\" 10x R  Standing hamstring stretch at stairs 30\" 2x R  Gait training with cane in left UE     Therapeutic Exercise Minutes 25   Evaluation Minutes 20   Total Time Of Timed Procedures 25   Total Time Of Service-Based Procedures 20   Total Treatment Time 45   HEP Access Code: [email or web address]  URL: [email or web address]  Date: 06/17/2025  Prepared by: Emily Daily    Exercises  - Supine Quadricep Sets  - 2 x daily - 7 x weekly - 1 sets - 10 reps - 5 seconds hold  - Small Range Straight Leg Raise  - 2 x daily - 7 x weekly - 1 sets - 10 reps  - Supine Heel Slide  - 2 x daily - 7 x weekly - 1 sets - 10 reps  - Supine Heel Slide with Strap  - 2 x daily - 7 x weekly - 1 sets - 10 reps  - Supine Short Arc Quad  - 2 x daily - 7 x weekly - 2 sets - 10 reps  - Standing Hamstring Stretch with Step  - 1 x daily - 7 x weekly - 1 sets - 2 reps - 30 seconds hold  -Flexion stretch at stairs, 5 second hold, 10x        Patient was instructed in and issued a HEP for: Access Code: [email or web address]  URL: [email or web address]  Date: 06/17/2025  Prepared by: Emily Daily    Exercises  - Supine Quadricep Sets  - 2 x daily - 7 x weekly - 1 sets - 10 reps - 5 seconds hold  - Small Range Straight Leg Raise  - 2 x daily - 7 x weekly - 1 sets - 10 reps  - Supine Heel Slide  - 2 x daily - 7 x weekly - 1 sets - 10 reps  - Supine Heel Slide with Strap  - 2 x daily - 7 x weekly - 1 sets - 10 reps  - Supine Short Arc Quad  - 2 x daily - 7 x weekly - 2 sets - 10 reps  - Standing Hamstring Stretch with Step  - 1 x daily - 7 x weekly - 1 sets - 2 reps - 30 seconds hold  -Flexion stretch at stairs, 5 second hold, 10x    Charges:  PT EVAL: Low Complexity, 2 Ther ex  In agreement with evaluation findings and clinical rationale, this evaluation involved LOW COMPLEXITY  decision making due to no personal factors/comorbidities, 1-2 body structures involved/activity limitations, and stable symptoms as documented in the evaluation.                                                                                                                PLAN OF CARE:      Goals: (to be met in 12 visits)      Not Met Progress Toward Partially Met Met   Pt will improve knee AROM flexion to >115 degrees to improve ability to perform stair negotiation and squatting activities. [] [] [] []   Pt will improve quad strength to 5/5 to ascend 1 flight of stairs reciprocally with UE assist. [] [] [] []   Pt will increase hip and knee strength to grossly 4+/5 to be able to get up and down from the floor safely. [] [] [] []   Pt will improve SLS to 5 s to improve safety with gait on uneven surfaces such as grass and gravel. [] [] [] []   Pt will be independent and compliant with comprehensive HEP to maintain progress achieved in PT. [] [] [] []           Frequency / Duration: Patient will be seen 2x/week or a total of 12  visits over a 90 day period. Treatment will include: Gait training; Manual Therapy; Neuromuscular Re-education; Therapeutic Activities; Therapeutic Exercise; Home Exercise Program instruction    Education or treatment limitation: None   Rehab Potential: good     LEFS Score  LEFS Score: (Patient-Rptd) 48.75 % (6/12/2025  1:22 PM)      Patient/Family/Caregiver was advised of these findings, precautions, and treatment options and has agreed to actively participate in planning and for this course of care.    Thank you for your referral. Please co-sign or sign and return this letter via fax as soon as possible to [phone]. If you have any questions, please contact me at Dept: [phone]    Sincerely,  Electronically signed by therapist: Emily Daily, PT  Physician's certification required: Yes  I certify the need for these services furnished under this plan of treatment and while under my  care.    X___________________________________________________ Date____________________    Certification From: 6/17/2025  To: 9/15/2025

## 2025-06-18 ENCOUNTER — ORDER TRANSCRIPTION (OUTPATIENT)
Dept: PHYSICAL THERAPY | Facility: HOSPITAL | Age: 65
End: 2025-06-18

## 2025-06-18 ENCOUNTER — ANESTHESIA EVENT (OUTPATIENT)
Dept: ENDOSCOPY | Facility: HOSPITAL | Age: 65
End: 2025-06-18
Payer: MEDICARE

## 2025-06-18 DIAGNOSIS — Z96.651 S/P TOTAL KNEE ARTHROPLASTY, RIGHT: Primary | ICD-10-CM

## 2025-06-19 ENCOUNTER — ANESTHESIA (OUTPATIENT)
Dept: ENDOSCOPY | Facility: HOSPITAL | Age: 65
End: 2025-06-19
Payer: MEDICARE

## 2025-06-19 ENCOUNTER — HOSPITAL ENCOUNTER (OUTPATIENT)
Facility: HOSPITAL | Age: 65
Setting detail: HOSPITAL OUTPATIENT SURGERY
Discharge: HOME | End: 2025-06-19
Attending: INTERNAL MEDICINE | Admitting: INTERNAL MEDICINE
Payer: MEDICARE

## 2025-06-19 VITALS
WEIGHT: 278 LBS | HEART RATE: 65 BPM | RESPIRATION RATE: 17 BRPM | OXYGEN SATURATION: 98 % | BODY MASS INDEX: 33.85 KG/M2 | SYSTOLIC BLOOD PRESSURE: 112 MMHG | DIASTOLIC BLOOD PRESSURE: 76 MMHG | HEIGHT: 76 IN

## 2025-06-19 DIAGNOSIS — Z12.11 COLON CANCER SCREENING: ICD-10-CM

## 2025-06-19 DIAGNOSIS — Z86.0100 HISTORY OF COLON POLYPS: ICD-10-CM

## 2025-06-19 PROCEDURE — 45385 COLONOSCOPY W/LESION REMOVAL: CPT | Performed by: INTERNAL MEDICINE

## 2025-06-19 RX ORDER — SODIUM CHLORIDE, SODIUM LACTATE, POTASSIUM CHLORIDE, CALCIUM CHLORIDE 600; 310; 30; 20 MG/100ML; MG/100ML; MG/100ML; MG/100ML
INJECTION, SOLUTION INTRAVENOUS CONTINUOUS
Status: DISCONTINUED | OUTPATIENT
Start: 2025-06-19 | End: 2025-06-19

## 2025-06-19 RX ORDER — LIDOCAINE HYDROCHLORIDE 10 MG/ML
INJECTION, SOLUTION EPIDURAL; INFILTRATION; INTRACAUDAL; PERINEURAL AS NEEDED
Status: DISCONTINUED | OUTPATIENT
Start: 2025-06-19 | End: 2025-06-19 | Stop reason: SURG

## 2025-06-19 RX ADMIN — LIDOCAINE HYDROCHLORIDE 50 MG: 10 INJECTION, SOLUTION EPIDURAL; INFILTRATION; INTRACAUDAL; PERINEURAL at 14:36:00

## 2025-06-19 RX ADMIN — SODIUM CHLORIDE, SODIUM LACTATE, POTASSIUM CHLORIDE, CALCIUM CHLORIDE: 600; 310; 30; 20 INJECTION, SOLUTION INTRAVENOUS at 14:59:00

## 2025-06-19 NOTE — H&P
History & Physical Examination    Patient Name: Lee Sunshine  MRN: [id number]  [facility]: [id number]  YOB: 1960    Diagnosis:   Colon cancer screening  Hx colon polyps       Prescriptions Prior to Admission[1]  Current Hospital Medications[2]    Allergies: Allergies[3]    Past Medical History[4]  Past Surgical History[5]  Family History[6]  Social History     Tobacco Use    Smoking status: Never    Smokeless tobacco: Never   Substance Use Topics    Alcohol use: Not Currently     Alcohol/week: 3.0 - 4.0 standard drinks of alcohol     Types: 1 - 2 Cans of beer, 2 Standard drinks or equivalent per week       SYSTEM Check if Review is Normal Check if Physical Exam is Normal If not normal, please explain:   HEENT [x ] [ x]    NECK & BACK [x ] [x ]    HEART [x ] [ x]    LUNGS [x ] [ x]    ABDOMEN [x ] [x ]    UROGENITAL [ ] [ ]    EXTREMITIES [x ] [x ]    OTHER        [ x ] I have discussed the risks and benefits and alternatives with the patient/family.  They understand and agree to proceed with plan of care.  [ x ] I have reviewed the History and Physical done within the last 30 days.  Any changes noted above.    Miguel Angel Roche MD  6/19/2025  2:15 PM         [1]   Medications Prior to Admission   Medication Sig Dispense Refill Last Dose/Taking    Sotalol HCl 160 MG Oral Tab Take 1 tablet (160 mg total) by mouth in the morning and 1 tablet (160 mg total) before bedtime.   6/19/2025    metoprolol tartrate 50 MG Oral Tab Take 0.5 tablets (25 mg total) by mouth 2 (two) times daily. 180 tablet 3 6/19/2025    apixaban (ELIQUIS) 5 MG Oral Tab Take 1 tablet (5 mg total) by mouth in the morning and 1 tablet (5 mg total) before bedtime.   6/17/2025    rosuvastatin 10 MG Oral Tab Take 1 tablet (10 mg total) by mouth nightly. 90 tablet 3 6/18/2025    HYDROcodone-acetaminophen 7.5-325 MG Oral Tab Take 1 tablet by mouth every 6 (six) hours as needed. (Patient not taking: Reported on 6/13/2025) 40 tablet 0 Not Taking     docusate sodium 100 MG Oral Cap Take 100 mg by mouth 2 (two) times daily. (Patient not taking: Reported on 6/13/2025) 20 capsule 0 Not Taking   [2]   Current Facility-Administered Medications   Medication Dose Route Frequency    lactated ringers infusion   Intravenous Continuous   [3] No Known Allergies  [4]   Past Medical History:   Agatston coronary artery calcium score between 100 and 199    Arrhythmia    A_Fib intermittently    ASD (atrial septal defect) (HCC)    Coronary atherosclerosis    High cholesterol    Obesity due to excess calories    Osteoarthritis    Paroxysmal atrial fibrillation (HCC)   [5]   Past Surgical History:  Procedure Laterality Date    Ablate arrhythmia add on  9/25/2023    Cholecystectomy      Colonoscopy N/A 1/25/2019    Procedure: COLONOSCOPY;  Surgeon: Miguel Angel Roche MD;  Location: [facility] ENDOSCOPY    Colonoscopy      Colonoscopy N/A 5/27/2022    Procedure: COLONOSCOPY;  Surgeon: Miguel Angel Roche MD;  Location: [facility] ENDOSCOPY    Ep cardioversion 1x  1/9/2018, April 2017         Ep cardioversion 1x  2/16/2022         Ep cardioversion 1x  11/16/2022         Ep cardioversion 1x  10/4/2024    Ep cardioversion 1x  4/9/2025    Laparoscopic cholecystectomy      Repr asd ostium premum  1990 approx    Stim/pacing heart post iv drug infu  9/25/2023    Tx atrial fib pulm vein isol  9/25/2023   [6]   Family History  Problem Relation Age of Onset    Heart Disorder Mother 35        Heart attack    Cancer Father

## 2025-06-19 NOTE — ANESTHESIA POSTPROCEDURE EVALUATION
Patient: Lee Sunshine    Procedure Summary       Date: 06/19/25 Room / Location: [facility] ENDOSCOPY 04 / [facility] ENDOSCOPY    Anesthesia Start: 1433 Anesthesia Stop: 1507    Procedure: COLONOSCOPY Diagnosis:       Colon cancer screening      History of colon polyps      (Polyps, hemorrhoids, diverticulosis)    Surgeons: Miguel Angel Roche MD Anesthesiologist: Emily Henley CRNA    Anesthesia Type: MAC ASA Status: 2            Anesthesia Type: MAC    Vitals Value Taken Time   /69 06/19/25 15:05   Temp 97 06/19/25 15:09   Pulse 87 06/19/25 15:08   Resp 17 06/19/25 15:08   SpO2 95 % 06/19/25 15:08   Vitals shown include unfiled device data.    [facility] AN Post Evaluation:   Patient Evaluated in PACU  Patient Participation: complete - patient participated  Level of Consciousness: awake  Pain Management: adequate  Airway Patency:patent  Yes    Nausea/Vomiting: none  Cardiovascular Status: acceptable  Respiratory Status: acceptable and room air  Postoperative Hydration acceptable      Emily Henley CRNA  6/19/2025 3:09 PM

## 2025-06-19 NOTE — ANESTHESIA PREPROCEDURE EVALUATION
Anesthesia PreOp Note    HPI:     Lee Sunshine is a 65 year old male who presents for preoperative consultation requested by: Miguel Angel Roche MD    Date of Surgery: 6/19/2025    Procedure(s):  COLONOSCOPY  Indication: Colon cancer screening   History of colon polyps    Relevant Problems   No relevant active problems       NPO:  Last Liquid Consumption Date: 06/19/25  Last Liquid Consumption Time: 0900  Last Solid Consumption Date: 06/18/25  Last Solid Consumption Time: 0830  Last Liquid Consumption Date: 06/19/25          History Review:  Patient Active Problem List    Diagnosis Date Noted    Colon cancer screening 06/19/2025    Hx of colonic polyps 06/19/2025    S/P total knee arthroplasty, right 05/21/2025    Primary osteoarthritis of left knee 08/24/2020    Agatston coronary artery calcium score between 100 and 199 08/20/2020    Long term current use of anticoagulant 10/14/2019    Hyperlipidemia 10/11/2018    Obesity due to excess calories 12/21/2017    Paroxysmal atrial fibrillation (HCC) 04/26/2017    ASD (atrial septal defect) (HCC) 04/26/2017       Past Medical History[1]    Past Surgical History[2]    Prescriptions Prior to Admission[3]  Current Medications and Prescriptions Ordered in Epic[4]    Allergies[5]    Family History[6]  Social Hx on file[7]    Available pre-op labs reviewed.  Lab Results   Component Value Date    WBC 6.8 04/23/2025    RBC 5.59 04/23/2025    HGB 13.1 05/21/2025    HCT 39.0 05/21/2025    MCV 85.3 04/23/2025    MCH 29.5 04/23/2025    MCHC 34.6 04/23/2025    RDW 12.5 04/23/2025    .0 04/23/2025     Lab Results   Component Value Date     04/23/2025    K 4.3 04/23/2025     04/23/2025    CO2 26.0 04/23/2025    BUN 13 04/23/2025    CREATSERUM 0.92 04/23/2025     (H) 04/23/2025    CA 9.7 04/23/2025          Vital Signs:  Body mass index is 33.84 kg/m².   height is 1.93 m (6' 4\") and weight is 126.1 kg (278 lb). His blood pressure is 132/74 and his pulse is 77.  His respiration is 15 and oxygen saturation is 96%.   Vitals:    06/13/25 1450 06/19/25 1341   BP:  132/74   Pulse:  77   Resp:  15   SpO2:  96%   Weight: 126.1 kg (278 lb)    Height: 1.93 m (6' 4\")         Anesthesia Evaluation     Patient summary reviewed and Nursing notes reviewed    Airway   Mallampati: I  TM distance: >3 FB  Neck ROM: full  Dental - Dentition appears grossly intact     Pulmonary - negative ROS and normal exam    breath sounds clear to auscultation  Cardiovascular - negative ROS and normal exam  Exercise tolerance: good  (+) dysrhythmias  (-) angina, FULTON    ECG reviewed  Rhythm: regular  Rate: normal    Neuro/Psych - negative ROS   (-) TIA, CVA    GI/Hepatic/Renal - negative ROS   (-) liver disease, renal disease    Endo/Other - negative ROS   Abdominal  - normal exam  (+) obese                 Anesthesia Plan:   ASA:  2  Plan:   MAC  Informed Consent Plan and Risks Discussed With:  Patient  Discussed plan with:  Attending      I have informed Lee Sunshine and/or legal guardian or family member of the nature of the anesthetic plan, benefits, risks including possible dental damage if relevant, major complications, and any alternative forms of anesthetic management.   All of the patient's questions were answered to the best of my ability. The patient desires the anesthetic management as planned.  Emily Henley CRNA  6/19/2025 2:20 PM  Present on Admission:  **None**           [1]   Past Medical History:   Agatston coronary artery calcium score between 100 and 199    Arrhythmia    A_Fib intermittently    ASD (atrial septal defect) (HCC)    Coronary atherosclerosis    High cholesterol    Obesity due to excess calories    Osteoarthritis    Paroxysmal atrial fibrillation (HCC)   [2]   Past Surgical History:  Procedure Laterality Date    Ablate arrhythmia add on  9/25/2023    Cholecystectomy      Colonoscopy N/A 1/25/2019    Procedure: COLONOSCOPY;  Surgeon: Miguel Angel Roche MD;  Location: [facility]  ENDOSCOPY    Colonoscopy      Colonoscopy N/A 5/27/2022    Procedure: COLONOSCOPY;  Surgeon: Miguel Angel Roche MD;  Location: [facility] ENDOSCOPY    Ep cardioversion 1x  1/9/2018, April 2017         Ep cardioversion 1x  2/16/2022         Ep cardioversion 1x  11/16/2022         Ep cardioversion 1x  10/4/2024    Ep cardioversion 1x  4/9/2025    Laparoscopic cholecystectomy      Repr asd ostium premum  1990 approx    Stim/pacing heart post iv drug infu  9/25/2023    Tx atrial fib pulm vein isol  9/25/2023   [3]   Medications Prior to Admission   Medication Sig Dispense Refill Last Dose/Taking    Sotalol HCl 160 MG Oral Tab Take 1 tablet (160 mg total) by mouth in the morning and 1 tablet (160 mg total) before bedtime.   6/19/2025    metoprolol tartrate 50 MG Oral Tab Take 0.5 tablets (25 mg total) by mouth 2 (two) times daily. 180 tablet 3 6/19/2025    apixaban (ELIQUIS) 5 MG Oral Tab Take 1 tablet (5 mg total) by mouth in the morning and 1 tablet (5 mg total) before bedtime.   6/17/2025    rosuvastatin 10 MG Oral Tab Take 1 tablet (10 mg total) by mouth nightly. 90 tablet 3 6/18/2025    HYDROcodone-acetaminophen 7.5-325 MG Oral Tab Take 1 tablet by mouth every 6 (six) hours as needed. (Patient not taking: Reported on 6/13/2025) 40 tablet 0 Not Taking    docusate sodium 100 MG Oral Cap Take 100 mg by mouth 2 (two) times daily. (Patient not taking: Reported on 6/13/2025) 20 capsule 0 Not Taking   [4]   Current Facility-Administered Medications Ordered in Epic   Medication Dose Route Frequency Provider Last Rate Last Admin    lactated ringers infusion   Intravenous Continuous Miguel Angel Roche MD         No current [facility]-ordered outpatient medications on file.   [5] No Known Allergies  [6]   Family History  Problem Relation Age of Onset    Heart Disorder Mother 35        Heart attack    Cancer Father    [7]   Social History  Socioeconomic History    Marital status:    Tobacco Use    Smoking status: Never    Smokeless  tobacco: Never   Vaping Use    Vaping status: Never Used   Substance and Sexual Activity    Alcohol use: Not Currently     Alcohol/week: 3.0 - 4.0 standard drinks of alcohol     Types: 1 - 2 Cans of beer, 2 Standard drinks or equivalent per week    Drug use: No    Sexual activity: Yes     Partners: Female   Other Topics Concern    Caffeine Concern No    Exercise No

## 2025-06-19 NOTE — OPERATIVE REPORT
[facility] Endoscopy Report  Date of procedure-June 19, 2025    Preoperative Diagnosis:  - Colon cancer screening  - History colon polyps    Postoperative Diagnosis:  - Colon polyp x 1  - Internal hemorrhoids moderate in size  - Diverticular disease    Procedure:    Colonoscopy     Surgeon:  Miguel Angel Roche M.D.    Anesthesia:  MAC   Ancef 2 grams iv preprocedure    Technique:  After informed consent, the patient was placed in the left lateral recumbent position.  Digital rectal examination revealed no palpable intraluminal abnormalities.  An Olympus variable stiffness 190 series HD colonoscope was inserted into the rectum and advanced under direct vision by following the lumen to the cecum.  The colon was examined upon withdrawal in the left lateral position.  The procedure was well tolerated without immediate complication.      Findings:  The preparation of the colon was good.  The terminal ileum was examined for 4 cm and visually normal.  The ileocecal valve was well preserved. The visualized colonic mucosa from the cecum to the anal verge was normal with an intact vascular pattern.    Ascending colon polyp x 1, this was sessile approximately 3 mm in size and was removed by cold snare technique.  No bleeding noted from the polypectomy site specimens retrieved and sent for analysis.    Diverticular disease located in the descending and sigmoid colon, no diverticulitis.    Moderate size internal hemorrhoids noted on retroflexed view.    Estimated blood loss-insignificant  Specimens-see above    Impression:  - Colon polyp x 1  - Internal hemorrhoids moderate in size  - Diverticular disease    Recommendations:  - Post polypectomy instructions given  - Repeat colonoscopy in 5 years  - High fiber diet for diverticular disease  - Symptomatic treatment of hemorrhoids          Miguel Angel Roche MD  6/19/2025  3:03 PM

## 2025-06-19 NOTE — DISCHARGE INSTRUCTIONS
Home Care Instructions for Colonoscopy  Diet:  - Resume your regular diet as tolerated unless otherwise instructed.  - Start with light meals to minimize bloating.  - Do not drink alcohol today.    Medication:  - If you have questions about resuming your normal medications, please contact your Primary Care Physician.      Activities:  - Take it easy today. Do not return to work today.  - Do not drive today.  - Do not operate any machinery today (including kitchen equipment).    Colonoscopy:  - You may notice some rectal \"spotting\" (a little blood on the toilet tissue) for a day or two after the exam. This is normal.  - If you experience any rectal bleeding (not spotting), persistent tenderness or sharp severe abdominal pains, oral temperature over 100 degrees Fahrenheit, light-headedness or dizziness, or any other problems, contact your doctor.      **If unable to reach your doctor, please go to the [facility] Emergency Room**    - Your referring physician will receive a full report of your examination.  - If you do not hear from your doctor's office within two weeks of your biopsy, please call them for your results.    You may be able to see your laboratory results in Golgi between 4 and 7 business days.  In some cases, your physician may not have viewed the results before they are released to Golgi.  If you have questions regarding your results contact the physician who ordered the test/exam by phone or via Golgi by choosing \"Ask a Medical Question.\"

## 2025-06-20 ENCOUNTER — OFFICE VISIT (OUTPATIENT)
Dept: PHYSICAL THERAPY | Age: 65
End: 2025-06-20
Payer: MEDICARE

## 2025-06-20 PROCEDURE — 97110 THERAPEUTIC EXERCISES: CPT

## 2025-06-20 NOTE — PROGRESS NOTES
I wanted to get back to you with your colonoscopy results.  You had one colon polyp removed which were benign.  I would advise a repeat colonoscopy in 5 years to make sure no new polyps are forming.      You also have internal hemorrhoids and diverticulosis.  Please stay on a high fiber diet and call with any questions.

## 2025-06-20 NOTE — PROGRESS NOTES
Patient: Lee Sunshine (65 year old, male) Referring Provider:  Insurance:   Diagnosis: Status post total right knee replacement (Z96.651), Right knee pain Physician Nessa CERVANTES   Date of Surgery: 5/20/2025 Next MD visit:  N/A   Precautions:  None none scheduled Referral Information:    Date of Evaluation: Req: 8, Auth: 8, Exp: 9/17/2025 06/17/25 POC Auth Visits:  8       Today's Date   6/20/2025    Subjective  Pt reports that he gets pretty stiff when sitting.  Having a lot of pain and stiffness over night.       Pain: 2/10 (2/10 current; 5-6/10 first thing in the morning)     Objective  See treatment log below.         Assessment  Knee flexion ROM improved since last visit.  Reviewed HEP and pt demonstrated understanding.    Goals (to be met in 12 visits)      Not Met Progress Toward Partially Met Met   Pt will improve knee AROM flexion to >115 degrees to improve ability to perform stair negotiation and squatting activities. [] [] [] []   Pt will improve quad strength to 5/5 to ascend 1 flight of stairs reciprocally with UE assist. [] [] [] []   Pt will increase hip and knee strength to grossly 4+/5 to be able to get up and down from the floor safely. [] [] [] []   Pt will improve SLS to 5 s to improve safety with gait on uneven surfaces such as grass and gravel. [] [] [] []   Pt will be independent and compliant with comprehensive HEP to maintain progress achieved in PT. [] [] [] []               Plan  Continue knee ROM.  Progress LE strengthening as pt tolerates.  Add Shuttle next visit.    Treatment Last 4 Visits  Treatment Day: 2       6/17/2025 6/20/2025   LE Treatment   Therapeutic Exercise Reviewed current HEP-heel slides, SAQ, supine hamstring stretch with strap  Heel slides with strap 10x R  Quad sets 5\" 10x R  SLR 10x R  Flexion stretch at stairs 5\" 10x R  Standing hamstring stretch at stairs 30\" 2x R  Gait training with cane in left UE   Nustep, LE, L2, 5 min  Right knee flex PROM in sitting  and supine  Supine heel slides 10x  Heel slides with strap 5\" 10x   Quad sets 5\" 10x    SAQ 20x  SLR 10x   LAQ 20x  Flexion stretch at stairs 5\" 10x   Standing hamstring stretch at stairs 30\" 2x   Mini squats 2x10  Double leg heel raises 20x  Calf stretch on slant board 30\" 2x       Therapeutic Exercise Minutes 25 42   Evaluation Minutes 20    Total Time Of Timed Procedures 25 42   Total Time Of Service-Based Procedures 20 0   Total Treatment Time 45 42   HEP Access Code: [email or web address]  URL: [email or web address]  Date: 06/17/2025  Prepared by: Emily Daily    Exercises  - Supine Quadricep Sets  - 2 x daily - 7 x weekly - 1 sets - 10 reps - 5 seconds hold  - Small Range Straight Leg Raise  - 2 x daily - 7 x weekly - 1 sets - 10 reps  - Supine Heel Slide  - 2 x daily - 7 x weekly - 1 sets - 10 reps  - Supine Heel Slide with Strap  - 2 x daily - 7 x weekly - 1 sets - 10 reps  - Supine Short Arc Quad  - 2 x daily - 7 x weekly - 2 sets - 10 reps  - Standing Hamstring Stretch with Step  - 1 x daily - 7 x weekly - 1 sets - 2 reps - 30 seconds hold  -Flexion stretch at stairs, 5 second hold, 10x         HEP  Access Code: [email or web address]  URL: [email or web address]  Date: 06/17/2025  Prepared by: Emily Daily    Exercises  - Supine Quadricep Sets  - 2 x daily - 7 x weekly - 1 sets - 10 reps - 5 seconds hold  - Small Range Straight Leg Raise  - 2 x daily - 7 x weekly - 1 sets - 10 reps  - Supine Heel Slide  - 2 x daily - 7 x weekly - 1 sets - 10 reps  - Supine Heel Slide with Strap  - 2 x daily - 7 x weekly - 1 sets - 10 reps  - Supine Short Arc Quad  - 2 x daily - 7 x weekly - 2 sets - 10 reps  - Standing Hamstring Stretch with Step  - 1 x daily - 7 x weekly - 1 sets - 2 reps - 30 seconds hold  -Flexion stretch at stairs, 5 second hold, 10x    Charges     3 Ther ex

## 2025-06-24 ENCOUNTER — TELEPHONE (OUTPATIENT)
Facility: CLINIC | Age: 65
End: 2025-06-24

## 2025-06-24 ENCOUNTER — OFFICE VISIT (OUTPATIENT)
Dept: PHYSICAL THERAPY | Age: 65
End: 2025-06-24
Payer: MEDICARE

## 2025-06-24 PROCEDURE — 97110 THERAPEUTIC EXERCISES: CPT

## 2025-06-24 NOTE — TELEPHONE ENCOUNTER
----- Message from Miguel Angel Roche sent at 6/20/2025  2:24 PM CDT -----  I wanted to get back to you with your colonoscopy results.  You had one colon polyp removed which were benign.  I would advise a repeat colonoscopy in 5 years to make sure no new polyps are forming.        You also have internal hemorrhoids and diverticulosis.  Please stay on a high fiber diet and call with any questions.     ----- Message -----  From: Lab, Background User  Sent: 6/20/2025  11:35 AM CDT  To: Miguel Angel Roche MD

## 2025-06-24 NOTE — PROGRESS NOTES
Patient: Lee Sunshine (65 year old, male) Referring Provider:  Insurance:   Diagnosis: Status post total right knee replacement (Z96.651), Right knee pain Physician Nessa CERVANTES   Date of Surgery: 5/20/2025 Next MD visit:  N/A   Precautions:  None none scheduled Referral Information:    Date of Evaluation: Req: 8, Auth: 8, Exp: 9/17/2025 06/17/25 POC Auth Visits:  8       Today's Date   6/24/2025    Subjective  Pt states that his knee is feeling about the same.  Has been walking around the house as much as he can but has not been walking outside due to the heat.       Pain: 2/10     Objective  See treatment log below.    Knee       6/17/2025 6/24/2025   Knee ROM/MMT   Rt Knee Flexion 80 AROM       85 PROM 91 AROM       104 PROM   Lt Knee Flexion 130    Rt Knee Flexion MMT 4    Lt Knee Flexion MMT 5    Rt Knee Extension (L3) 0    Lt Knee Extension (L3) 0    Rt Knee Extension MMT (L3) 4    Lt Knee Extension MMT (L3) 5             Assessment  Knee ROM is progressing but pt still with c/o pain with end-range flexion ROM.  Added Shuttle leg press today and pt tolerated well.    Goals (to be met in 12 visits)      Not Met Progress Toward Partially Met Met   Pt will improve knee AROM flexion to >115 degrees to improve ability to perform stair negotiation and squatting activities. [] [] [] []   Pt will improve quad strength to 5/5 to ascend 1 flight of stairs reciprocally with UE assist. [] [] [] []   Pt will increase hip and knee strength to grossly 4+/5 to be able to get up and down from the floor safely. [] [] [] []   Pt will improve SLS to 5 s to improve safety with gait on uneven surfaces such as grass and gravel. [] [] [] []   Pt will be independent and compliant with comprehensive HEP to maintain progress achieved in PT. [] [] [] []                   Plan  Continue knee ROM.  Progress LE strengthening as pt tolerates. Try step ups next visit.    Treatment Last 4 Visits  Treatment Day: 3       6/17/2025  6/20/2025 6/24/2025   LE Treatment   Therapeutic Exercise Reviewed current HEP-heel slides, SAQ, supine hamstring stretch with strap  Heel slides with strap 10x R  Quad sets 5\" 10x R  SLR 10x R  Flexion stretch at stairs 5\" 10x R  Standing hamstring stretch at stairs 30\" 2x R  Gait training with cane in left UE   Nustep, LE, L2, 5 min  Right knee flex PROM in sitting and supine  Supine heel slides 10x  Heel slides with strap 5\" 10x   Quad sets 5\" 10x    SAQ 20x  SLR 10x   LAQ 20x  Flexion stretch at stairs 5\" 10x   Standing hamstring stretch at stairs 30\" 2x   Mini squats 2x10  Double leg heel raises 20x  Calf stretch on slant board 30\" 2x     Nustep, LE, L2, 5 min   Right knee flex PROM in sitting and supine   Supine heel slides 10x   Heel slides with strap 5\" 10x   Quad sets 10\" 10x    SAQ 3# 20x   SLR 10x   LAQ 20x   Shuttle Double leg press 6 cords 20x  Shuttle Single leg press 3 cords 2x10  Flexion stretch at stairs 5\" 10x   Standing hamstring stretch at stairs 30\" 2x   Mini squats 2x10        Therapeutic Exercise Minutes 25 42 45   Evaluation Minutes 20     Total Time Of Timed Procedures 25 42 45   Total Time Of Service-Based Procedures 20 0 0   Total Treatment Time 45 42 45   HEP Access Code: [email or web address]  URL: [email or web address]  Date: 06/17/2025  Prepared by: Emily Daily    Exercises  - Supine Quadricep Sets  - 2 x daily - 7 x weekly - 1 sets - 10 reps - 5 seconds hold  - Small Range Straight Leg Raise  - 2 x daily - 7 x weekly - 1 sets - 10 reps  - Supine Heel Slide  - 2 x daily - 7 x weekly - 1 sets - 10 reps  - Supine Heel Slide with Strap  - 2 x daily - 7 x weekly - 1 sets - 10 reps  - Supine Short Arc Quad  - 2 x daily - 7 x weekly - 2 sets - 10 reps  - Standing Hamstring Stretch with Step  - 1 x daily - 7 x weekly - 1 sets - 2 reps - 30 seconds hold  -Flexion stretch at stairs, 5 second hold, 10x          HEP  Access Code: [email or web address]  URL:  [email or web address]  Date: 06/17/2025  Prepared by: Emily Daily    Exercises  - Supine Quadricep Sets  - 2 x daily - 7 x weekly - 1 sets - 10 reps - 5 seconds hold  - Small Range Straight Leg Raise  - 2 x daily - 7 x weekly - 1 sets - 10 reps  - Supine Heel Slide  - 2 x daily - 7 x weekly - 1 sets - 10 reps  - Supine Heel Slide with Strap  - 2 x daily - 7 x weekly - 1 sets - 10 reps  - Supine Short Arc Quad  - 2 x daily - 7 x weekly - 2 sets - 10 reps  - Standing Hamstring Stretch with Step  - 1 x daily - 7 x weekly - 1 sets - 2 reps - 30 seconds hold  -Flexion stretch at stairs, 5 second hold, 10x    Charges     3 Ther ex

## 2025-06-24 NOTE — TELEPHONE ENCOUNTER
Recall colonoscopy in 5 years per Dr. Roche.     Last done 06/19/2025     Recall entered into patient outreach for  3/19/2030      Health maintenance updated.      Last read by Lee Sunshine at 8:15AM on 6/24/2025.

## 2025-06-27 ENCOUNTER — OFFICE VISIT (OUTPATIENT)
Dept: PHYSICAL THERAPY | Age: 65
End: 2025-06-27
Payer: MEDICARE

## 2025-06-27 PROCEDURE — 97110 THERAPEUTIC EXERCISES: CPT

## 2025-06-27 NOTE — PROGRESS NOTES
Patient: Lee Sunshine (65 year old, male) Referring Provider:  Insurance:   Diagnosis: Status post total right knee replacement (Z96.651), Right knee pain Physician Nessa CERVANTES   Date of Surgery: 5/20/2025 Next MD visit:  N/A   Precautions:  None 7/9/25 Referral Information:    Date of Evaluation: Req: 8, Auth: 8, Exp: 9/17/2025 06/17/25 POC Auth Visits:  8       Today's Date   6/27/2025    Subjective  Patient reports he has been walking around his house without the crutch and doing well. Has been stretching his knee. Has some stiffness but pain is not bad.       Pain: 2/10     Objective  See treatment log below.       Knee       6/17/2025 6/24/2025 6/27/2025   Knee ROM/MMT   Rt Knee Flexion 80 AROM       85 PROM 91 AROM       104 PROM 95       PROM 104 deg   Lt Knee Flexion 130     Rt Knee Flexion MMT 4     Lt Knee Flexion MMT 5     Rt Knee Extension (L3) 0     Lt Knee Extension (L3) 0     Rt Knee Extension MMT (L3) 4     Lt Knee Extension MMT (L3) 5            Assessment  Patient demos extensor lag with SLR. Would benefit from continued focus on restoring R knee ROM, along with quad strengthening to promote functional gait and stair negotiation.    Goals (to be met in 12 visits)      Not Met Progress Toward Partially Met Met   Pt will improve knee AROM flexion to >115 degrees to improve ability to perform stair negotiation and squatting activities. [] [] [] []   Pt will improve quad strength to 5/5 to ascend 1 flight of stairs reciprocally with UE assist. [] [] [] []   Pt will increase hip and knee strength to grossly 4+/5 to be able to get up and down from the floor safely. [] [] [] []   Pt will improve SLS to 5 s to improve safety with gait on uneven surfaces such as grass and gravel. [] [] [] []   Pt will be independent and compliant with comprehensive HEP to maintain progress achieved in PT. [] [] [] []                         Plan  Continue knee ROM.  Progress LE strengthening as pt  tolerates.    Treatment Last 4 Visits  Treatment Day: 4       6/17/2025 6/20/2025 6/24/2025 6/27/2025   LE Treatment   Therapeutic Exercise Reviewed current HEP-heel slides, SAQ, supine hamstring stretch with strap  Heel slides with strap 10x R  Quad sets 5\" 10x R  SLR 10x R  Flexion stretch at stairs 5\" 10x R  Standing hamstring stretch at stairs 30\" 2x R  Gait training with cane in left UE   Nustep, LE, L2, 5 min  Right knee flex PROM in sitting and supine  Supine heel slides 10x  Heel slides with strap 5\" 10x   Quad sets 5\" 10x    SAQ 20x  SLR 10x   LAQ 20x  Flexion stretch at stairs 5\" 10x   Standing hamstring stretch at stairs 30\" 2x   Mini squats 2x10  Double leg heel raises 20x  Calf stretch on slant board 30\" 2x     Nustep, LE, L2, 5 min   Right knee flex PROM in sitting and supine   Supine heel slides 10x   Heel slides with strap 5\" 10x   Quad sets 10\" 10x    SAQ 3# 20x   SLR 10x   LAQ 20x   Shuttle Double leg press 6 cords 20x  Shuttle Single leg press 3 cords 2x10  Flexion stretch at stairs 5\" 10x   Standing hamstring stretch at stairs 30\" 2x   Mini squats 2x10      Nustep, LE, L4, 8 min   Right knee flex/ext PROM  Quad sets with towel roll 20x5\"   SLR with quad set 10x   Prone ham curl 10x  Prone knee flexion with therapist OP 10\"x10  Shuttle Double leg press 6.5 cords 20x  Shuttle Single leg press 4 cords 2x10  6\" fwd step ups 15x with B hand rails  Knee flexion stretch at stair 10x10\"      Therapeutic Exercise Minutes 25 42 45 40   Evaluation Minutes 20      Total Time Of Timed Procedures 25 42 45 40   Total Time Of Service-Based Procedures 20 0 0 0   Total Treatment Time 45 42 45 40   HEP Access Code: [email or web address]  URL: [email or web address]  Date: 06/17/2025  Prepared by: Emily Daily    Exercises  - Supine Quadricep Sets  - 2 x daily - 7 x weekly - 1 sets - 10 reps - 5 seconds hold  - Small Range Straight Leg Raise  - 2 x daily - 7 x weekly - 1 sets - 10 reps  - Supine Heel Slide   - 2 x daily - 7 x weekly - 1 sets - 10 reps  - Supine Heel Slide with Strap  - 2 x daily - 7 x weekly - 1 sets - 10 reps  - Supine Short Arc Quad  - 2 x daily - 7 x weekly - 2 sets - 10 reps  - Standing Hamstring Stretch with Step  - 1 x daily - 7 x weekly - 1 sets - 2 reps - 30 seconds hold  -Flexion stretch at stairs, 5 second hold, 10x   No changes        HEP  No changes    Charges     TE x 3

## 2025-07-01 ENCOUNTER — OFFICE VISIT (OUTPATIENT)
Dept: PHYSICAL THERAPY | Age: 65
End: 2025-07-01
Payer: MEDICARE

## 2025-07-01 PROCEDURE — 97110 THERAPEUTIC EXERCISES: CPT

## 2025-07-01 NOTE — PROGRESS NOTES
Patient: Lee Sunshine (65 year old, male) Referring Provider:  Insurance:   Diagnosis: Status post total right knee replacement (Z96.651), Right knee pain Physician Nessa CERVANTES   Date of Surgery: 5/20/2025 Next MD visit:  N/A   Precautions:  None 7/9/25 Referral Information:    Date of Evaluation: Req: 8, Auth: 8, Exp: 9/17/2025 06/17/25 POC Auth Visits:  8       Today's Date   7/1/2025    Subjective  Patient reports he has not used the crutch since last Friday. Has been doing more walking around the house and walked around the store today. Was able to sleep better last night.       Pain: 1/10     Objective  See treatment log below.       Knee       6/24/2025 6/27/2025 7/1/2025   Knee ROM/MMT   Rt Knee Flexion 91 AROM       104 PROM 95       PROM 104 deg 108       110 deg PROM          Assessment  Improved quad activation with SLR. R knee ROM steadily improving. Initiated step downs to facilitate reciprocal stair negotiation.    Goals (to be met in 12 visits)      Not Met Progress Toward Partially Met Met   Pt will improve knee AROM flexion to >115 degrees to improve ability to perform stair negotiation and squatting activities. [] [] [] []   Pt will improve quad strength to 5/5 to ascend 1 flight of stairs reciprocally with UE assist. [] [] [] []   Pt will increase hip and knee strength to grossly 4+/5 to be able to get up and down from the floor safely. [] [] [] []   Pt will improve SLS to 5 s to improve safety with gait on uneven surfaces such as grass and gravel. [] [] [] []   Pt will be independent and compliant with comprehensive HEP to maintain progress achieved in PT. [] [] [] []                             Plan  Continue knee ROM.  Progress LE strengthening as pt tolerates.    Treatment Last 4 Visits  Treatment Day: 5       6/20/2025 6/24/2025 6/27/2025 7/1/2025   LE Treatment   Therapeutic Exercise Nustep, LE, L2, 5 min  Right knee flex PROM in sitting and supine  Supine heel slides 10x  Heel  slides with strap 5\" 10x   Quad sets 5\" 10x    SAQ 20x  SLR 10x   LAQ 20x  Flexion stretch at stairs 5\" 10x   Standing hamstring stretch at stairs 30\" 2x   Mini squats 2x10  Double leg heel raises 20x  Calf stretch on slant board 30\" 2x     Nustep, LE, L2, 5 min   Right knee flex PROM in sitting and supine   Supine heel slides 10x   Heel slides with strap 5\" 10x   Quad sets 10\" 10x    SAQ 3# 20x   SLR 10x   LAQ 20x   Shuttle Double leg press 6 cords 20x  Shuttle Single leg press 3 cords 2x10  Flexion stretch at stairs 5\" 10x   Standing hamstring stretch at stairs 30\" 2x   Mini squats 2x10      Nustep, LE, L4, 8 min   Right knee flex/ext PROM  Quad sets with towel roll 20x5\"   SLR with quad set 10x   Prone ham curl 10x  Prone knee flexion with therapist OP 10\"x10  Shuttle Double leg press 6.5 cords 20x  Shuttle Single leg press 4 cords 2x10  6\" fwd step ups 15x with B hand rails  Knee flexion stretch at stair 10x10\"    Nustep, LE, L4, 8 min   Right knee flex/ext PROM  SLR with quad set 2x10  Prone knee flexion with therapist OP 10\"x10  4\" fwd step down 20x with 1 hand rail   6\" fwd step ups 20x with no UE support  Shuttle Double leg press 7 cords 20x  Shuttle Single leg press 4.5 cords 20x  Knee flexion stretch alternating with HS stretch at stair 10\"x5 ea     Therapeutic Exercise Minutes 42 45 40 40   Total Time Of Timed Procedures 42 45 40 40   Total Time Of Service-Based Procedures 0 0 0 0   Total Treatment Time 42 45 40 40   HEP   No changes         Charges     TE x 3

## 2025-07-02 ENCOUNTER — APPOINTMENT (OUTPATIENT)
Dept: PHYSICAL THERAPY | Age: 65
End: 2025-07-02
Payer: MEDICARE

## 2025-07-07 ENCOUNTER — OFFICE VISIT (OUTPATIENT)
Dept: INTERNAL MEDICINE CLINIC | Facility: CLINIC | Age: 65
End: 2025-07-07

## 2025-07-07 VITALS
OXYGEN SATURATION: 98 % | HEART RATE: 76 BPM | WEIGHT: 278 LBS | SYSTOLIC BLOOD PRESSURE: 106 MMHG | BODY MASS INDEX: 33.85 KG/M2 | HEIGHT: 76 IN | RESPIRATION RATE: 20 BRPM | DIASTOLIC BLOOD PRESSURE: 70 MMHG | TEMPERATURE: 97 F

## 2025-07-07 DIAGNOSIS — I48.0 PAROXYSMAL ATRIAL FIBRILLATION (HCC): ICD-10-CM

## 2025-07-07 DIAGNOSIS — Z00.00 ENCOUNTER FOR ANNUAL HEALTH EXAMINATION: Primary | ICD-10-CM

## 2025-07-07 DIAGNOSIS — Z96.653 HISTORY OF BILATERAL KNEE REPLACEMENT: ICD-10-CM

## 2025-07-07 DIAGNOSIS — Z23 NEED FOR VACCINATION: ICD-10-CM

## 2025-07-07 DIAGNOSIS — E78.5 HYPERLIPIDEMIA, UNSPECIFIED HYPERLIPIDEMIA TYPE: ICD-10-CM

## 2025-07-07 NOTE — PROGRESS NOTES
Subjective:   Lee Sunshine is a 65 year old male who presents for a MA AHA (Medicare Advantage Annual Health Assessment) and Medicare Subsequent Annual Wellness visit (Pt already had Initial Annual Wellness) and scheduled follow up of multiple significant but stable problems.   History of Present Illness    Patient is here requesting Medicare annual wellness visit and follow-up on chronic medical issues as listed below.  I last saw him back in April 2021 for general physical exam.  Since that time he has seen numerous providers including neurology, EP cardiology, orthopedics, physical therapy, cardiology.  At some point over the past few years he has undergone total knee replacement on both knees. Right TKR was 7 weeks ago.  Current medications reviewed.  Health maintenance and vaccination status reviewed.  AdVanced directives reviewed.      Blood work done on April 23 of this year includes hemoglobin A1c, CMP, CBC.  In February of this year he had a lipid profile, PSA, and TSH done. Colonoscopy on 6/20 showed TA polyp. Cardio did cardioversion and ablation; afib recurred after the ablation then cardioverted again into NSR; he is being medicated and may go through a second ablation soon.     Now with the new knees, he is planning on increasing activities and losing weight. His weight is down 15 pounds from the time of surgery. The knee is doing well. HE is going through PT. He is at 111 degrees of flexibility. Diet is healthy; wife cooks healthy with veggies. He had some loose stool recently; he cut back on dairy with improvement. Not checking BP at home. For exercise, he is retired so he does stuff around the house with the grandkids; plans on walking more.     He feels he still has some level of neuropathy. He was on gabapentin in the past but not for years. The neuropathy is not that bad now. No tobacco. Not much EtOH; he is cautious given the a fib issues; now has a beer once in a while.     Wt Readings from  Last 6 Encounters:   07/07/25 278 lb (126.1 kg)   06/13/25 278 lb (126.1 kg)   05/20/25 292 lb (132.5 kg)   05/12/25 291 lb (132 kg)   04/23/25 293 lb 6.4 oz (133.1 kg)   04/03/25 291 lb (132 kg)          History/Other:   Fall Risk Assessment:   He has been screened for Falls and is low risk.      Cognitive Assessment:   He had a completely normal cognitive assessment - see flowsheet entries     Functional Ability/Status:   Lee Sunshine has a completely normal functional assessment. See flowsheet for details.      Depression Screening (PHQ):  PHQ-2 SCORE: 0  , done 7/7/2025   If you checked off any problems, how difficult have these problems made it for you to do your work, take care of things at home, or get along with other people?: Not difficult at all    Last [place] Suicide Screening on 7/7/2025 was No Risk.          Advanced Directives:   He has a Living Will on file in [place]; reviewed and discussed documents with patient (and family/surrogate if present).  He does have a POA but we do NOT have it on file in Epic.    Discussed Advance Care Planning with patient (and family/surrogate if present). Standard forms made available to patient in After Visit Summary.      Patient Active Problem List   Diagnosis    Paroxysmal atrial fibrillation (HCC)    ASD (atrial septal defect) (HCC)    Obesity due to excess calories    Hyperlipidemia    Long term current use of anticoagulant    Agatston coronary artery calcium score between 100 and 199    Primary osteoarthritis of left knee    S/P total knee arthroplasty, right    Colon cancer screening    Hx of colonic polyps     Allergies:  He has no known allergies.    Current Medications:  Outpatient Medications Marked as Taking for the 7/7/25 encounter (Office Visit) with Etienne Christopher MD   Medication Sig    Sotalol HCl 160 MG Oral Tab Take 1 tablet (160 mg total) by mouth in the morning and 1 tablet (160 mg total) before bedtime.    metoprolol tartrate 50 MG Oral Tab Take  0.5 tablets (25 mg total) by mouth 2 (two) times daily.    apixaban (ELIQUIS) 5 MG Oral Tab Take 1 tablet (5 mg total) by mouth in the morning and 1 tablet (5 mg total) before bedtime.    rosuvastatin 10 MG Oral Tab Take 1 tablet (10 mg total) by mouth nightly.       Medical History:  He  has a past medical history of Agatston coronary artery calcium score between 100 and 199 (08/20/2020), Arrhythmia, ASD (atrial septal defect) (HCC), Coronary atherosclerosis, High cholesterol, Obesity due to excess calories (12/21/2017), Osteoarthritis, and Paroxysmal atrial fibrillation (HCC) (04/26/2017).  Surgical History:  He  has a past surgical history that includes Laparoscopic cholecystectomy; ep cardioversion 1x (1/9/2018, April 2017); repr asd ostium premum (1990 approx); cholecystectomy; colonoscopy (N/A, 1/25/2019); colonoscopy (06/19/2025); ep cardioversion 1x (2/16/2022); colonoscopy (N/A, 5/27/2022); ep cardioversion 1x (11/16/2022); stim/pacing heart post iv drug infu (09/25/2023); ablate arrhythmia add on (09/25/2023); tx atrial fib pulm vein isol (09/25/2023); ep cardioversion 1x (10/04/2024); ep cardioversion 1x (04/09/2025); colonoscopy (06/19/2025); and colonoscopy (N/A, 6/19/2025).   Family History:  His family history includes Cancer in his father; Heart Disorder (age of onset: 35) in his mother.  Social History:  He  reports that he has never smoked. He has never used smokeless tobacco. He reports that he does not currently use alcohol after a past usage of about 3.0 - 4.0 standard drinks of alcohol per week. He reports that he does not use drugs.    Tobacco:  He has never smoked tobacco.    CAGE Alcohol Screen:   CAGE screening score of 0 on 7/7/2025, showing low risk of alcohol abuse.      Patient Care Team:  Etienne Christopher MD as PCP - General (Internal Medicine)  Omid Salcido MD as Neurologist (NEUROLOGY)  Roxy Moses MD (NEUROLOGY)  Fracisco Cotton DO as Consulting Physician  (NEUROLOGY)  Crystal Jones MD (Cardiac Electrophysiology)    Review of Systems       Objective:   Physical Exam  Constitutional:       Appearance: Normal appearance. He is well-developed.   HENT:      Right Ear: Tympanic membrane and ear canal normal.      Left Ear: Tympanic membrane and ear canal normal.      Nose: Nose normal.      Mouth/Throat:      Pharynx: No oropharyngeal exudate or posterior oropharyngeal erythema.   Eyes:      Conjunctiva/sclera: Conjunctivae normal.      Pupils: Pupils are equal, round, and reactive to light.   Neck:      Thyroid: No thyromegaly.      Vascular: No carotid bruit.   Cardiovascular:      Rate and Rhythm: Normal rate and regular rhythm.      Pulses: Normal pulses.      Heart sounds: Normal heart sounds. No murmur heard.  Pulmonary:      Effort: Pulmonary effort is normal.      Breath sounds: Normal breath sounds. No wheezing or rales.   Abdominal:      General: Bowel sounds are normal.      Palpations: Abdomen is soft. There is no mass.      Tenderness: There is no abdominal tenderness.      Hernia: There is no hernia in the left inguinal area.   Genitourinary:     Penis: Normal and circumcised.       Testes: Normal.   Musculoskeletal:      Right lower leg: No edema.      Left lower leg: No edema.      Comments: Right knee with well-healed surgical scar.  Still with some swelling and increased warmth.   Lymphadenopathy:      Cervical: No cervical adenopathy.   Skin:     General: Skin is warm and dry.      Findings: No rash.   Neurological:      General: No focal deficit present.      Mental Status: He is alert.      Cranial Nerves: No cranial nerve deficit.      Coordination: Coordination normal.   Psychiatric:         Mood and Affect: Mood normal.         Behavior: Behavior normal.         Thought Content: Thought content normal.         Judgment: Judgment normal.          /70 (BP Location: Left arm, Patient Position: Sitting, Cuff Size: large)   Pulse 76   Temp 97 °F  (36.1 °C) (Other)   Resp 20   Ht 6' 4\" (1.93 m)   Wt 278 lb (126.1 kg)   SpO2 98%   BMI 33.84 kg/m²  Estimated body mass index is 33.84 kg/m² as calculated from the following:    Height as of this encounter: 6' 4\" (1.93 m).    Weight as of this encounter: 278 lb (126.1 kg).    Medicare Hearing Assessment:   Hearing Screening    Screening Method: Questionnaire  I have a problem hearing over the telephone: No I have trouble following the conversations when two or more people are talking at the same time: No   I have trouble understanding things on the TV: No I have to strain to understand conversations: No   I have to worry about missing the telephone ring or doorbell: No I have trouble hearing conversations in a noisy background such as a crowded room or restaurant: No   I get confused about where sounds come from: No I misunderstand some words in a sentence and need to ask people to repeat themselves: No   I especially have trouble understanding the speech of women and children: No I have trouble understanding the speaker in a large room such as at a meeting or place of [employer]: No   Many people I talk to seem to mumble (or don't speak clearly): No People get annoyed because I misunderstand what they say: No   I misunderstand what others are saying and make inappropriate responses: No I avoid social activities because I cannot hear well and fear I will reply improperly: No   Family members and friends have told me they think I may have hearing loss: No             Visual Acuity:   Right Eye Visual Acuity: Corrected Right Eye Chart Acuity: 20/30   Left Eye Visual Acuity: Corrected Left Eye Chart Acuity: 20/30   Both Eyes Visual Acuity: Corrected Both Eyes Chart Acuity: 20/30   Able To Tolerate Visual Acuity: Yes        Assessment & Plan:   Lee Sunshine is a 65 year old male who presents for a Medicare Assessment.     1. Encounter for annual health examination.  Physical exam is unremarkable.  Overall the  patient is doing quite well.  Advanced directives in place.  Encouraged continued healthy diet and regular exercise.  He has lost significant weight recently.  Encouraged to continue with that trend.  Currently at 278 pounds.  Perhaps getting closer to 260 or 270 would be a possibility.  No need for additional blood testing as everything had been done earlier this year.    2. Paroxysmal atrial fibrillation (HCC)  Patient has been through numerous procedures and is on multiple medications.  Appears to be in normal sinus rhythm at this time.  Follow-up with cardiology.  May be getting another ablation sometime soon.    3. Hyperlipidemia, unspecified hyperlipidemia type  Stable on the rosuvastatin.  Also with elevated cardiac calcium scan in the past so probably a good idea to have this.  He did have extensive coronary artery imaging prior to the knee replacement.    4. History of bilateral knee replacement  Doing well now about 7 weeks after the right knee replacement.  The left knee was replaced years ago.  Continue with physical therapy and regular activity.    5. Need for vaccination  Vaccination status reviewed.  Given pneumonia shot today.  He can get the shingles shot at the pharmacy.  - PCV20 (Prevnar 20)      Assessment & Plan      The patient indicates understanding of these issues and agrees to the plan.  Reinforced healthy diet, lifestyle, and exercise.      Return in about 1 year (around 7/7/2026) for Yearly Medicare Physical.     Etienne Christopher MD, 7/7/2025     Supplementary Documentation:   General Health:  In the past six months, have you lost more than 10 pounds without trying?: (Patient-Rptd) 2 - No  Has your appetite been poor?: (Patient-Rptd) No  Type of Diet: (Patient-Rptd) Balanced  How does the patient maintain a good energy level?: (Patient-Rptd) Appropriate Exercise  How would you describe your daily physical activity?: (Patient-Rptd) Moderate  How would you describe your current health  state?: (Patient-Rptd) Good  How do you maintain positive mental well-being?: (Patient-Rptd) Social Interaction, Puzzles, Games, Visiting Friends, Visiting Family  On a scale of 0 to 10, with 0 being no pain and 10 being severe pain, what is your pain level?: (Patient-Rptd) 1 - (Mild)  In the past six months, have you experienced urine leakage?: (Patient-Rptd) 0-No  At any time do you feel concerned for the safety/well-being of yourself and/or your children, in your home or elsewhere?: No  Have you had any immunizations at another office such as Influenza, Hepatitis B, Tetanus, or Pneumococcal?: (Patient-Rptd) No    Health Maintenance   Topic Date Due    Pneumococcal Vaccine: 50+ Years (1 of 1 - PCV) Never done    Zoster Vaccines (1 of 2) Never done    COVID-19 Vaccine (1 - 2024-25 season) Never done    Influenza Vaccine (1) 10/01/2025    PSA  02/25/2027    Colorectal Cancer Screening  06/19/2030    Annual Well Visit  Completed    Annual Depression Screening  Completed    Fall Risk Screening (Annual)  Completed    Meningococcal B Vaccine  Aged Out

## 2025-07-09 ENCOUNTER — OFFICE VISIT (OUTPATIENT)
Dept: PHYSICAL THERAPY | Age: 65
End: 2025-07-09
Attending: PHYSICIAN ASSISTANT
Payer: MEDICARE

## 2025-07-09 PROCEDURE — 97110 THERAPEUTIC EXERCISES: CPT

## 2025-07-09 NOTE — PROGRESS NOTES
Patient: Lee Sunshine (65 year old, male) Referring Provider:  Insurance:   Diagnosis: Status post total right knee replacement (Z96.651), Right knee pain Physician Nessa CERVANTES   Date of Surgery: 5/20/2025 Next MD visit:  N/A   Precautions:  None none scheduled Referral Information:    Date of Evaluation: Req: 8, Auth: 8, Exp: 9/17/2025 06/17/25 POC Auth Visits:  8       Today's Date   7/9/2025    Subjective  Patient reports he saw PA this morning and was pleased with his progress but told him to continue to work on ROM. Patient reports his knee was a little sore yesterday with the rain. Some days are better than others.       Pain: 1/10     Objective  See treatment log below.       Knee       6/27/2025 7/1/2025 7/9/2025   Knee ROM/MMT   Rt Knee Flexion 95       PROM 104 deg 108       110 deg PROM 112   Rt Knee Extension (L3)   0          Assessment  R knee ROM steadily improving. Increased step height for step training with good tolerance.    Goals (to be met in 12 visits)      Not Met Progress Toward Partially Met Met   Pt will improve knee AROM flexion to >115 degrees to improve ability to perform stair negotiation and squatting activities. [] [] [] []   Pt will improve quad strength to 5/5 to ascend 1 flight of stairs reciprocally with UE assist. [] [] [] []   Pt will increase hip and knee strength to grossly 4+/5 to be able to get up and down from the floor safely. [] [] [] []   Pt will improve SLS to 5 s to improve safety with gait on uneven surfaces such as grass and gravel. [] [] [] []   Pt will be independent and compliant with comprehensive HEP to maintain progress achieved in PT. [] [] [] []                                 Plan  Continue knee ROM.  Progress LE strengthening as pt tolerates.    Treatment Last 4 Visits  Treatment Day: 6       6/24/2025 6/27/2025 7/1/2025 7/9/2025   LE Treatment   Therapeutic Exercise Nustep, LE, L2, 5 min   Right knee flex PROM in sitting and supine   Supine  heel slides 10x   Heel slides with strap 5\" 10x   Quad sets 10\" 10x    SAQ 3# 20x   SLR 10x   LAQ 20x   Shuttle Double leg press 6 cords 20x  Shuttle Single leg press 3 cords 2x10  Flexion stretch at stairs 5\" 10x   Standing hamstring stretch at stairs 30\" 2x   Mini squats 2x10      Nustep, LE, L4, 8 min   Right knee flex/ext PROM  Quad sets with towel roll 20x5\"   SLR with quad set 10x   Prone ham curl 10x  Prone knee flexion with therapist OP 10\"x10  Shuttle Double leg press 6.5 cords 20x  Shuttle Single leg press 4 cords 2x10  6\" fwd step ups 15x with B hand rails  Knee flexion stretch at stair 10x10\"    Nustep, LE, L4, 8 min   Right knee flex/ext PROM  SLR with quad set 2x10  Prone knee flexion with therapist OP 10\"x10  4\" fwd step down 20x with 1 hand rail   6\" fwd step ups 20x with no UE support  Shuttle Double leg press 7 cords 20x  Shuttle Single leg press 4.5 cords 20x  Knee flexion stretch alternating with HS stretch at stair 10\"x5 ea   Nustep, LE, L5, 8 min   Right knee flex/ext PROM  SLR with quad set 2x10  6\" fwd step down 20x with 1 hand rail   8\" fwd/lat step ups 20x with no UE support  Shuttle Double leg press 7.5 cords 20x  Shuttle Single leg press 4.5 cords 20x  HS stretch at stair 3x30\"   Tilt board A/P and M/L taps 20x ea to balance x 30\"   Gastroc stretch on slant x 1 min     Therapeutic Exercise Minutes 45 40 40 40   Total Time Of Timed Procedures 45 40 40 40   Total Time Of Service-Based Procedures 0 0 0 0   Total Treatment Time 45 40 40 40   HEP  No changes            Charges     TE x 3

## 2025-07-11 ENCOUNTER — OFFICE VISIT (OUTPATIENT)
Dept: PHYSICAL THERAPY | Age: 65
End: 2025-07-11
Attending: PHYSICIAN ASSISTANT
Payer: MEDICARE

## 2025-07-11 PROCEDURE — 97110 THERAPEUTIC EXERCISES: CPT

## 2025-07-11 NOTE — PROGRESS NOTES
Patient: Lee Sunshine (65 year old, male) Referring Provider:  Insurance:   Diagnosis: Status post total right knee replacement (Z96.651), Right knee pain Physician Nessa CERVANTES   Date of Surgery: 5/20/2025 Next MD visit:  N/A   Precautions:  None none scheduled Referral Information:    Date of Evaluation: Req: 8, Auth: 8, Exp: 9/17/2025 06/17/25 POC Auth Visits:  8       Today's Date   7/11/2025    Subjective  Patient reports not much change since last visit. Notices a little less pain and a little less stiffness every day.       Pain: 1/10     Objective  See treatment log below.         Assessment  Continued to progress closed chain strengthening to promote return to functional gait and stair negotiation. No increased pain reported post session.    Goals (to be met in 12 visits)      Not Met Progress Toward Partially Met Met   Pt will improve knee AROM flexion to >115 degrees to improve ability to perform stair negotiation and squatting activities. [] [] [] []   Pt will improve quad strength to 5/5 to ascend 1 flight of stairs reciprocally with UE assist. [] [] [] []   Pt will increase hip and knee strength to grossly 4+/5 to be able to get up and down from the floor safely. [] [] [] []   Pt will improve SLS to 5 s to improve safety with gait on uneven surfaces such as grass and gravel. [] [] [] []   Pt will be independent and compliant with comprehensive HEP to maintain progress achieved in PT. [] [] [] []                                     Plan  Continue knee ROM.  Progress LE strengthening as pt tolerates.    Treatment Last 4 Visits  Treatment Day: 7       6/27/2025 7/1/2025 7/9/2025 7/11/2025   LE Treatment   Therapeutic Exercise Nustep, LE, L4, 8 min   Right knee flex/ext PROM  Quad sets with towel roll 20x5\"   SLR with quad set 10x   Prone ham curl 10x  Prone knee flexion with therapist OP 10\"x10  Shuttle Double leg press 6.5 cords 20x  Shuttle Single leg press 4 cords 2x10  6\" fwd step ups 15x  with B hand rails  Knee flexion stretch at stair 10x10\"    Nustep, LE, L4, 8 min   Right knee flex/ext PROM  SLR with quad set 2x10  Prone knee flexion with therapist OP 10\"x10  4\" fwd step down 20x with 1 hand rail   6\" fwd step ups 20x with no UE support  Shuttle Double leg press 7 cords 20x  Shuttle Single leg press 4.5 cords 20x  Knee flexion stretch alternating with HS stretch at stair 10\"x5 ea   Nustep, LE, L5, 8 min   Right knee flex/ext PROM  SLR with quad set 2x10  6\" fwd step down 20x with 1 hand rail   8\" fwd/lat step ups 20x with no UE support  Shuttle Double leg press 7.5 cords 20x  Shuttle Single leg press 4.5 cords 20x  HS stretch at stair 3x30\"   Tilt board A/P and M/L taps 20x ea to balance x 30\"   Gastroc stretch on slant x 1 min   Nustep, LE, L5, 8 min   Right knee flex/ext PROM  SLR with quad set 2x10 with 1.5#  Mini squat to mat table 2x10  Standing hip abd/ext 2x10 ea R/L with RTB   Shuttle Double leg press 8 cords 20x  Shuttle Single leg press 5 cords 20x  6\" fwd step down 20x with 1 hand rail   BOSU fwd alt lunge 15x  HS stretch at stair 3x30\"        Therapeutic Exercise Minutes 40 40 40 40   Total Time Of Timed Procedures 40 40 40 40   Total Time Of Service-Based Procedures 0 0 0 0   Total Treatment Time 40 40 40 40   HEP No changes           Charges     TE x 3

## 2025-07-16 ENCOUNTER — OFFICE VISIT (OUTPATIENT)
Dept: PHYSICAL THERAPY | Age: 65
End: 2025-07-16
Attending: PHYSICIAN ASSISTANT
Payer: MEDICARE

## 2025-07-16 PROCEDURE — 97110 THERAPEUTIC EXERCISES: CPT

## 2025-07-16 NOTE — PROGRESS NOTES
Progress Summary  Pt has attended 8 visits in Physical Therapy.     Patient: Lee Sunshine (65 year old, male) Referring Provider:  Insurance:   Diagnosis: Status post total right knee replacement (Z96.651), Right knee pain Physician Nessa CERVANTES   Date of Surgery: 5/20/2025 Next MD visit:  N/A   Precautions:  None none scheduled Referral Information:    Date of Evaluation: Req: 8, Auth: 8, Exp: 9/17/2025 06/17/25 POC Auth Visits:  8       Today's Date   7/16/2025    Subjective  Patient reports he feels he is starting to do better. Sleeping better. Less stiffness and pain.       Pain: 0/10     Objective  See treatment log below.       Knee       7/9/2025 7/11/2025 7/16/2025   Knee ROM/MMT   Rt Knee Flexion 112 115 117   Rt Knee Flexion MMT   5   Rt Knee Extension (L3) 0 0 0   Rt Knee Extension MMT (L3)   4+          Assessment  Patient has attended 8 of 8 approved visits with steadily improving R knee ROM, as well as improved RLE strength allowing for improved gait and stair negotiation. Patient would benefit from continued skilled therapy to address remaining deficits to allow for full return to prior level of function.    Goals (to be met in 12 visits)      Not Met Progress Toward Partially Met Met   Pt will improve knee AROM flexion to >115 degrees to improve ability to perform stair negotiation and squatting activities. [] [] [] []   Pt will improve quad strength to 5/5 to ascend 1 flight of stairs reciprocally with UE assist. [] [] [] []   Pt will increase hip and knee strength to grossly 4+/5 to be able to get up and down from the floor safely. [] [] [] []   Pt will improve SLS to 5 s to improve safety with gait on uneven surfaces such as grass and gravel. [] [] [] []   Pt will be independent and compliant with comprehensive HEP to maintain progress achieved in PT. [] [] [] []                                        Plan: Continue skilled Physical Therapy 2 x/week or a total of 4 more visits over a  90 day period. Treatment will include: R knee ROM, stretching, RLE strengthening, proprioceptive training.       Patient was advised of these findings, precautions, and treatment options and has agreed to actively participate in planning and for this course of care.    Thank you for your referral. If you have any questions, please contact me at Dept: [phone].    Sincerely,  Electronically signed by therapist: Mitesh Liu PT     Physician's certification required:  Yes  Please co-sign or sign and return this letter via fax as soon as possible to [phone].   I certify the need for these services furnished under this plan of treatment and while under my care.    X___________________________________________________ Date____________________    Certification From: 7/16/2025  To:10/14/2025       Treatment Last 4 Visits  Treatment Day: 8       7/1/2025 7/9/2025 7/11/2025 7/16/2025   LE Treatment   Therapeutic Exercise Nustep, LE, L4, 8 min   Right knee flex/ext PROM  SLR with quad set 2x10  Prone knee flexion with therapist OP 10\"x10  4\" fwd step down 20x with 1 hand rail   6\" fwd step ups 20x with no UE support  Shuttle Double leg press 7 cords 20x  Shuttle Single leg press 4.5 cords 20x  Knee flexion stretch alternating with HS stretch at stair 10\"x5 ea   Nustep, LE, L5, 8 min   Right knee flex/ext PROM  SLR with quad set 2x10  6\" fwd step down 20x with 1 hand rail   8\" fwd/lat step ups 20x with no UE support  Shuttle Double leg press 7.5 cords 20x  Shuttle Single leg press 4.5 cords 20x  HS stretch at stair 3x30\"   Tilt board A/P and M/L taps 20x ea to balance x 30\"   Gastroc stretch on slant x 1 min   Nustep, LE, L5, 8 min   Right knee flex/ext PROM  SLR with quad set 2x10 with 1.5#  Mini squat to mat table 2x10  Standing hip abd/ext 2x10 ea R/L with RTB   Shuttle Double leg press 8 cords 20x  Shuttle Single leg press 5 cords 20x  6\" fwd step down 20x with 1 hand rail   BOSU fwd alt lunge 15x  HS stretch at  stair 3x30\"      Nustep, LE, L5, 7 min   Right knee flex/ext PROM  SLR with quad set 2x10 with 1.5#  Lateral walk with YTB 4x10 steps  Monster walk with YTB 4x10 steps  DL heel raise off step 20x   Gastroc stretch on slant x 1 min  Shuttle Double leg press 8 cords 20x  Shuttle Single leg press 5 cords 20x  8\" lat step up 20x   HS stretch at stair 3x30\"    Therapeutic Exercise Minutes 40 40 40 40   Total Time Of Timed Procedures 40 40 40 40   Total Time Of Service-Based Procedures 0 0 0 0   Total Treatment Time 40 40 40 40        HEP  No changes    Charges     TE x 3

## 2025-07-23 ENCOUNTER — OFFICE VISIT (OUTPATIENT)
Dept: PHYSICAL THERAPY | Age: 65
End: 2025-07-23
Attending: PHYSICIAN ASSISTANT
Payer: MEDICARE

## 2025-07-23 PROCEDURE — 97110 THERAPEUTIC EXERCISES: CPT

## 2025-07-23 NOTE — PROGRESS NOTES
Discharge Summary  Pt has attended 9 visits in Physical Therapy.     Patient: Lee Sunshine (65 year old, male) Referring Provider:  Insurance:   Diagnosis: Status post total right knee replacement (Z96.651), Right knee pain Physician Nessa CERVANTES   Date of Surgery: 5/20/2025 Next MD visit:  N/A   Precautions:  None none scheduled Referral Information:    Date of Evaluation: Req: 16, Auth: 16, Exp: 10/18/2025    06/17/25 POC Auth Visits:  16       Today's Date   7/23/2025    Subjective  Patient reports he is doing very well. Was able to cut his grass with just a little soreness after.       Pain: 0/10     Objective  See treatment log below.       Knee       7/11/2025 7/16/2025 7/23/2025   Knee ROM/MMT   Rt Knee Flexion 115 117 122   Rt Knee Flexion MMT  5 5   Rt Knee Extension (L3) 0 0 0   Rt Knee Extension MMT (L3)  4+ 5          Assessment  Patient has attended 9 visits with significant improvement in R knee ROM and RLE strength allowing for return to reciprocal stair negotiation and normalized gait pattern. Patient has met all goals and will be discharged from PT at this time.    Goals (to be met in 12 visits)       Not Met Progress Toward Partially Met Met   Pt will improve knee AROM flexion to >115 degrees to improve ability to perform stair negotiation and squatting activities. []  []  []  [x]    Pt will improve quad strength to 5/5 to ascend 1 flight of stairs reciprocally with UE assist. []  []  []  [x]    Pt will increase hip and knee strength to grossly 4+/5 to be able to get up and down from the floor safely. []  []  []  [x]    Pt will improve SLS to 5 s to improve safety with gait on uneven surfaces such as grass and gravel. []  []  []  [x]    Pt will be independent and compliant with comprehensive HEP to maintain progress achieved in PT. []  []  []  [x]                 Plan  Discharge PT    Treatment Last 4 Visits  Treatment Day: 9       7/9/2025 7/11/2025 7/16/2025 7/23/2025   LE Treatment    Therapeutic Exercise Nustep, LE, L5, 8 min   Right knee flex/ext PROM  SLR with quad set 2x10  6\" fwd step down 20x with 1 hand rail   8\" fwd/lat step ups 20x with no UE support  Shuttle Double leg press 7.5 cords 20x  Shuttle Single leg press 4.5 cords 20x  HS stretch at stair 3x30\"   Tilt board A/P and M/L taps 20x ea to balance x 30\"   Gastroc stretch on slant x 1 min   Nustep, LE, L5, 8 min   Right knee flex/ext PROM  SLR with quad set 2x10 with 1.5#  Mini squat to mat table 2x10  Standing hip abd/ext 2x10 ea R/L with RTB   Shuttle Double leg press 8 cords 20x  Shuttle Single leg press 5 cords 20x  6\" fwd step down 20x with 1 hand rail   BOSU fwd alt lunge 15x  HS stretch at stair 3x30\"      Nustep, LE, L5, 7 min   Right knee flex/ext PROM  SLR with quad set 2x10 with 1.5#  Lateral walk with YTB 4x10 steps  Monster walk with YTB 4x10 steps  DL heel raise off step 20x   Gastroc stretch on slant x 1 min  Shuttle Double leg press 8 cords 20x  Shuttle Single leg press 5 cords 20x  8\" lat step up 20x   HS stretch at stair 3x30\"  Nustep, LE, L6, 7 min   Prone knee flexion stretch by therapist 10\"x10   Lateral walk with RTB 4x10 steps  Monster walk with RTB 4x10 steps  Shuttle Double leg press 8 cords 2x15  Shuttle Single leg press 6 cords 2x15  8\" lat step over 20x   BOSU fwd alt lunge 20x   HS stretch at stair 3x30\"    Therapeutic Exercise Minutes 40 40 40 40   Total Time Of Timed Procedures 40 40 40 40   Total Time Of Service-Based Procedures 0 0 0 0   Total Treatment Time 40 40 40 40   HEP    Reviewed         HEP  Reviewed     Charges     TE x 3

## 2025-08-01 ENCOUNTER — APPOINTMENT (OUTPATIENT)
Dept: PHYSICAL THERAPY | Age: 65
End: 2025-08-01
Attending: PHYSICIAN ASSISTANT

## 2025-08-04 ENCOUNTER — LAB ENCOUNTER (OUTPATIENT)
Dept: LAB | Age: 65
End: 2025-08-04
Attending: INTERNAL MEDICINE

## 2025-08-04 DIAGNOSIS — I48.19 PERSISTENT ATRIAL FIBRILLATION (HCC): Primary | ICD-10-CM

## 2025-08-04 DIAGNOSIS — Z79.01 CURRENT USE OF ANTICOAGULANT THERAPY: ICD-10-CM

## 2025-08-04 DIAGNOSIS — E78.5 HYPERLIPIDEMIA, UNSPECIFIED HYPERLIPIDEMIA TYPE: ICD-10-CM

## 2025-08-04 DIAGNOSIS — Z79.899 LONG TERM CURRENT USE OF ANTIARRHYTHMIC DRUG: ICD-10-CM

## 2025-08-04 LAB
ALBUMIN SERPL-MCNC: 4.9 G/DL (ref 3.2–4.8)
ALBUMIN/GLOB SERPL: 2 (ref 1–2)
ALP LIVER SERPL-CCNC: 59 U/L (ref 45–117)
ALT SERPL-CCNC: 24 U/L (ref 10–49)
ANION GAP SERPL CALC-SCNC: 7 MMOL/L (ref 0–18)
AST SERPL-CCNC: 20 U/L (ref ?–34)
BASOPHILS # BLD AUTO: 0.03 X10(3) UL (ref 0–0.2)
BASOPHILS NFR BLD AUTO: 0.6 %
BILIRUB SERPL-MCNC: 0.6 MG/DL (ref 0.2–1.1)
BUN BLD-MCNC: 13 MG/DL (ref 9–23)
BUN/CREAT SERPL: 14.3 (ref 10–20)
CALCIUM BLD-MCNC: 9.5 MG/DL (ref 8.7–10.4)
CHLORIDE SERPL-SCNC: 103 MMOL/L (ref 98–112)
CHOLEST SERPL-MCNC: 135 MG/DL (ref ?–200)
CO2 SERPL-SCNC: 29 MMOL/L (ref 21–32)
CREAT BLD-MCNC: 0.91 MG/DL (ref 0.7–1.3)
DEPRECATED RDW RBC AUTO: 41.6 FL (ref 35.1–46.3)
EGFRCR SERPLBLD CKD-EPI 2021: 93 ML/MIN/1.73M2 (ref 60–?)
EOSINOPHIL # BLD AUTO: 0.2 X10(3) UL (ref 0–0.7)
EOSINOPHIL NFR BLD AUTO: 3.7 %
ERYTHROCYTE [DISTWIDTH] IN BLOOD BY AUTOMATED COUNT: 13.2 % (ref 11–15)
FASTING PATIENT LIPID ANSWER: YES
FASTING STATUS PATIENT QL REPORTED: YES
GLOBULIN PLAS-MCNC: 2.4 G/DL (ref 2–3.5)
GLUCOSE BLD-MCNC: 112 MG/DL (ref 70–99)
HCT VFR BLD AUTO: 45.3 % (ref 39–53)
HDLC SERPL-MCNC: 35 MG/DL (ref 40–59)
HGB BLD-MCNC: 14.6 G/DL (ref 13–17.5)
IMM GRANULOCYTES # BLD AUTO: 0.01 X10(3) UL (ref 0–1)
IMM GRANULOCYTES NFR BLD: 0.2 %
LDLC SERPL CALC-MCNC: 68 MG/DL (ref ?–100)
LYMPHOCYTES # BLD AUTO: 0.98 X10(3) UL (ref 1–4)
LYMPHOCYTES NFR BLD AUTO: 18.3 %
MCH RBC QN AUTO: 27.7 PG (ref 26–34)
MCHC RBC AUTO-ENTMCNC: 32.2 G/DL (ref 31–37)
MCV RBC AUTO: 86 FL (ref 80–100)
MONOCYTES # BLD AUTO: 0.44 X10(3) UL (ref 0.1–1)
MONOCYTES NFR BLD AUTO: 8.2 %
NEUTROPHILS # BLD AUTO: 3.69 X10 (3) UL (ref 1.5–7.7)
NEUTROPHILS # BLD AUTO: 3.69 X10(3) UL (ref 1.5–7.7)
NEUTROPHILS NFR BLD AUTO: 69 %
NONHDLC SERPL-MCNC: 100 MG/DL (ref ?–130)
OSMOLALITY SERPL CALC.SUM OF ELEC: 289 MOSM/KG (ref 275–295)
PLATELET # BLD AUTO: 228 10(3)UL (ref 150–450)
POTASSIUM SERPL-SCNC: 4.7 MMOL/L (ref 3.5–5.1)
PROT SERPL-MCNC: 7.3 G/DL (ref 5.7–8.2)
RBC # BLD AUTO: 5.27 X10(6)UL (ref 3.8–5.8)
SODIUM SERPL-SCNC: 139 MMOL/L (ref 136–145)
TRIGL SERPL-MCNC: 190 MG/DL (ref 30–149)
VLDLC SERPL CALC-MCNC: 29 MG/DL (ref 0–30)
WBC # BLD AUTO: 5.4 X10(3) UL (ref 4–11)

## 2025-08-04 PROCEDURE — 85025 COMPLETE CBC W/AUTO DIFF WBC: CPT

## 2025-08-04 PROCEDURE — 80053 COMPREHEN METABOLIC PANEL: CPT

## 2025-08-04 PROCEDURE — 36415 COLL VENOUS BLD VENIPUNCTURE: CPT

## 2025-08-04 PROCEDURE — 80061 LIPID PANEL: CPT

## (undated) DEVICE — REM POLYHESIVE ADULT PATIENT RETURN ELECTRODE: Brand: VALLEYLAB

## (undated) DEVICE — CONTAINER SPEC STR 4OZ GRY LID

## (undated) DEVICE — 3M™ STERI-DRAPE™ U-DRAPE 1015: Brand: STERI-DRAPE™

## (undated) DEVICE — VIOLET BRAIDED (POLYGLACTIN 910), SYNTHETIC ABSORBABLE SUTURE: Brand: COATED VICRYL

## (undated) DEVICE — TRAP MCS 40ML 5IN PLS SCR CAP

## (undated) DEVICE — Device: Brand: JELCO

## (undated) DEVICE — BANDAGE COMPR W6INXL11YD WVN COT/E CLP CLSR

## (undated) DEVICE — Device

## (undated) DEVICE — CATH CHLGM 18IN 4.5FR PP

## (undated) DEVICE — 60 ML SYRINGE REGULAR TIP: Brand: MONOJECT

## (undated) DEVICE — KIT CLEAN ENDOKIT 1.1OZ GOWNX2

## (undated) DEVICE — STERILE LATEX POWDER-FREE SURGICAL GLOVESWITH NITRILE COATING: Brand: PROTEXIS

## (undated) DEVICE — SUTURE VICRYL 2-0 FS-1

## (undated) DEVICE — SCREWS PACK: Brand: KNEE INSTRUMENTS

## (undated) DEVICE — LINE MNTR ADLT SET O2 INTMD

## (undated) DEVICE — SNARE CAPTIFLEX MICRO-OVL OLY

## (undated) DEVICE — 3M™ TEGADERM™ TRANSPARENT FILM DRESSING FRAME STYLE, 1626W, 4 IN X 4-3/4 IN (10 CM X 12 CM), 50/CT 4CT/CASE: Brand: 3M™ TEGADERM™

## (undated) DEVICE — BLADE SAW SAGITTAL 19.5

## (undated) DEVICE — SMOOTH PINS PACK: Brand: KNEE INSTRUMENTS

## (undated) DEVICE — PACK CDS TOTAL KNEE

## (undated) DEVICE — TROCAR: Brand: KII FIOS FIRST ENTRY

## (undated) DEVICE — GAMMEX® NON-LATEX PI ORTHO SIZE 8.5, STERILE POLYISOPRENE POWDER-FREE SURGICAL GLOVE: Brand: GAMMEX

## (undated) DEVICE — Device: Brand: STABLECUT®

## (undated) DEVICE — GAMMEX® NON-LATEX PI ORTHO SIZE 9, STERILE POLYISOPRENE POWDER-FREE SURGICAL GLOVE: Brand: GAMMEX

## (undated) DEVICE — APPLICATOR PREP 26ML CHG 2% ISO ALC 70%

## (undated) DEVICE — MYKNEE MIS TIBIAL BONE MODEL LEFT MEDIAL: Brand: MY KNEE BONE MODELS

## (undated) DEVICE — 60 ML SYRINGE LUER-LOCK TIP: Brand: MONOJECT

## (undated) DEVICE — 2T11 #2 PDO 36 X 36: Brand: 2T11 #2 PDO 36 X 36

## (undated) DEVICE — ZZ-CONVERTED-TO-524825-ADHESIVE SKIN TOP FOR WND CLSR DERMBND ADV

## (undated) DEVICE — CEMENT MIXING SYSTEM WITH FEMORAL BREAKWAY NOZZLE: Brand: REVOLUTION

## (undated) DEVICE — PAD THRP 16IN WRPON MU LNG STM

## (undated) DEVICE — COTTON ROLL: Brand: DEROYAL

## (undated) DEVICE — CLIP LGT 11MM OPEN 2.8MM 235CM

## (undated) DEVICE — BANDAGE FLXMSTR 11YDX6IN STRL

## (undated) DEVICE — GAUZE SPONGES,12 PLY: Brand: CURITY

## (undated) DEVICE — DRESSING 10X4IN ANMC SAFETAC

## (undated) DEVICE — SOL  .9 1000ML BTL

## (undated) DEVICE — TROCAR: Brand: KII® SLEEVE

## (undated) DEVICE — MYKNEE PPS MIS TIBCUTBLOCK-MRI-GMK-LM-#6: Brand: MYKNEE PPS

## (undated) DEVICE — TRAY CATH FOLEY 16FR INCLUDE BARDX IC COMPLT CARE

## (undated) DEVICE — GAMMEX® PI HYBRID SIZE 9, STERILE POWDER-FREE SURGICAL GLOVE, POLYISOPRENE AND NEOPRENE BLEND: Brand: GAMMEX

## (undated) DEVICE — NEEDLE SPINAL 20X3-1/2 YELLOW

## (undated) DEVICE — BATTERY

## (undated) DEVICE — MYKNEE MIS TIBIAL BONE MODEL RIGHT MEDIAL: Brand: MY KNEE BONE MODELS

## (undated) DEVICE — ENDOAVITENE MICROFIBRILLAR COLLAGEN HEMOSTAT IN AN ENDOSCOPIC DELIVERY SYSTEM: Brand: ENDOAVITENE

## (undated) DEVICE — TRAY FOLEY BDX 16F STATLOCK

## (undated) DEVICE — ZIMMER® STERILE DISPOSABLE TOURNIQUET CUFF WITH PLC, DUAL PORT, SINGLE BLADDER, 30 IN. (76 CM)

## (undated) DEVICE — V2 SPECIMEN COLLECTION TRAY: Brand: NEPTUNE

## (undated) DEVICE — SUCTION CANISTER, 3000CC,SAFELINER: Brand: DEROYAL

## (undated) DEVICE — WRAP COOLING KNEE W/ICE PILLOW

## (undated) DEVICE — MYKNEE PATIENT SPECIFIC GUIDES

## (undated) DEVICE — SNARE ENDOSCOPIC 10MM ROUND

## (undated) DEVICE — MYKNEE PPS MIS TIBCUTBLOCK-MRI-GMK-RM-#5: Brand: MYKNEE PPS

## (undated) DEVICE — MEDI-VAC NON-CONDUCTIVE SUCTION TUBING 6MM X 1.8M (6FT.) L: Brand: CARDINAL HEALTH

## (undated) DEVICE — TOTAL KNEE: Brand: MEDLINE INDUSTRIES, INC.

## (undated) DEVICE — DRESSING FOAM 4X10IN POLYUR SAFETAC

## (undated) DEVICE — SUTURE VICRYL 0 UR-6

## (undated) DEVICE — ZZ-CONVERTED-TO-522442- SPONGE 4X4 10PK

## (undated) DEVICE — X-STREAM LAPAROSCOPIC IRRIGATION TUBING SET WITH NEZHAT-DORSEY TRUMPET VALVE, AND COJOINED SUCTION/IRRIGATION TUBING, WITHOUT PROBE TIP: Brand: X-STREAM

## (undated) DEVICE — FORCEP RADIAL JAW 4

## (undated) DEVICE — TISSUE RETRIEVAL SYSTEM: Brand: INZII RETRIEVAL SYSTEM

## (undated) DEVICE — MYKNEE FEMUR BONE MODEL RIGHT: Brand: MY KNEE BONE MODELS

## (undated) DEVICE — DERMABOND LIQUID ADHESIVE

## (undated) DEVICE — BANDAGE ROLL,100% COTTON, 6 PLY, LARGE: Brand: KERLIX

## (undated) DEVICE — MYKNEE PPS STD FEMDISCUTBLOCK-MRI-GMK-LM-#7: Brand: MYKNEE PPS

## (undated) DEVICE — DISPOSABLE TOURNIQUET CUFF SINGLE BLADDER, DUAL PORT AND QUICK CONNECT CONNECTOR: Brand: COLOR CUFF

## (undated) DEVICE — MYKNEE FEMUR BONE MODEL LEFT: Brand: MY KNEE BONE MODELS

## (undated) DEVICE — TRI FLAT PIN-MED

## (undated) DEVICE — SOLUTION IRRIG 3000ML 0.9% NACL FLX CONT

## (undated) DEVICE — 3.5 FEMALE HEX HEAD PIN-Z

## (undated) DEVICE — SHEET,DRAPE,53X77,STERILE: Brand: MEDLINE

## (undated) DEVICE — SHORT THREADED PINS PACK: Brand: KNEE INSTRUMENTS

## (undated) DEVICE — HOOD: Brand: FLYTE

## (undated) DEVICE — SUT COAT VCRL+ 1 18IN CTX ABSRB VLT ANTIBACT

## (undated) DEVICE — 2DE14 2-0 PDO 24 X 24: Brand: 2DE14 2-0 PDO 24 X 24

## (undated) DEVICE — ANTIBACTERIAL UNDYED BRAIDED (POLYGLACTIN 910), SYNTHETIC ABSORBABLE SUTURE: Brand: COATED VICRYL

## (undated) DEVICE — CHLORAPREP 26ML APPLICATOR

## (undated) DEVICE — Device: Brand: CUSTOM PROCEDURE KIT

## (undated) DEVICE — 450 ML BOTTLE OF 0.05% CHLORHEXIDINE GLUCONATE IN 99.95% STERILE WATER FOR IRRIGATION, USP AND APPLICATOR.: Brand: IRRISEPT ANTIMICROBIAL WOUND LAVAGE

## (undated) DEVICE — SUTURE MONOCRYL 4-0 Y845G

## (undated) DEVICE — SUTURE MONOCRYL 4-0 Y935H

## (undated) DEVICE — CAUTERY ENDO L HOOK EZ-CLEAN

## (undated) DEVICE — NEEDLE SPNL 20GA L3.5IN YEL QNCKE STYL DISP

## (undated) DEVICE — 35 ML SYRINGE REGULAR TIP: Brand: MONOJECT

## (undated) DEVICE — NET SPECIMEN RETRIEVAL BLUE

## (undated) DEVICE — KIT ENDO ORCAPOD 160/180/190

## (undated) DEVICE — SOL  .9 1000ML BAG

## (undated) DEVICE — VISUALIZATION SYSTEM: Brand: CLEARIFY

## (undated) DEVICE — [HIGH FLOW INSUFFLATOR,  DO NOT USE IF PACKAGE IS DAMAGED,  KEEP DRY,  KEEP AWAY FROM SUNLIGHT,  PROTECT FROM HEAT AND RADIOACTIVE SOURCES.]: Brand: PNEUMOSURE

## (undated) DEVICE — SOL  .9 3000ML

## (undated) DEVICE — DRAPE SHEET LG

## (undated) DEVICE — POLAR CARE CUBE COOLING UNIT

## (undated) DEVICE — Device: Brand: DEFENDO AIR/WATER/SUCTION AND BIOPSY VALVE

## (undated) DEVICE — LASSO POLYPECTOMY SNARE: Brand: LASSO

## (undated) DEVICE — BANDAGE,GAUZE,4.5"X4.1YD,STERILE,LF: Brand: MEDLINE

## (undated) DEVICE — V2 SPECIMEN COLLECTION MANIFOLD KIT: Brand: NEPTUNE

## (undated) DEVICE — PDS II VLT 0 107CM AG ST3: Brand: ENDOLOOP

## (undated) DEVICE — STERILE POLYISOPRENE POWDER-FREE SURGICAL GLOVES: Brand: PROTEXIS

## (undated) DEVICE — LAP CHOLE: Brand: MEDLINE INDUSTRIES, INC.

## (undated) DEVICE — LIGACLIP 10-M/L, 10MM ENDOSCOPIC ROTATING MULTIPLE CLIP APPLIERS: Brand: LIGACLIP

## (undated) NOTE — LETTER
1/28/2019              Mohit Parrish        [address] [facility]          Dear Zaina Aly,    I wanted to get back to you with your colonoscopy results.  You had 6 colon polyps removed which were benign.  I would advise a repeat colonoscopy in

## (undated) NOTE — LETTER
11/2/2021    Juancho Casarez        [address] [facility]            Dear Juancho Casarez,      Our records indicate that you are due for an appointment for a Colonoscopy in January 2022, or sometime there after, with Kadi Dorantes MD.    Ciaran Odell

## (undated) NOTE — LETTER
[address] [place] [address]                        [phone]      10/18/2021      Sanjeev Green MD  [address]           Patient: Candido Teran   YOB: 1960

## (undated) NOTE — LETTER
[facility]  Administration of Anesthesia  1. Rock Cárdenasjazlyn freeman _________________________________ acting on his behalf, (Patient) (Dependent/Representative) request to receive anesthesia for my pending procedure/operation/treatment.   ALEX pardo infections, high spinal block, spinal bleeding, seizure, cardiac arrest and death. 7. AWARENESS: I understand that it is possible (but unlikely) to have explicit memory of events from the operating room while under general anesthesia.   8. ELECTROCONVULSIV (Date) (Time)                                                                                               (Responsible person in case of minor/ unconscious pt) /Relationship    My signature below affirms that prior to the time of the procedure, I have ex

## (undated) NOTE — LETTER
[facility]  Administration of Anesthesia  1. Tessie Stoddard, or _________________________________ acting on his behalf, (Patient) (Dependent/Representative) request to receive anesthesia for my pending procedure/operation/treatment. A physician (anesthesiologist) alone or an anesthesiologist working with a nurse anesthetist may administer my anesthesia. 2. I understand that my anesthesiologist is not an employee or agent of the hospital, but is an independent medical practitioner who has been permitted to use its facilities for the care and treatment of his/her patients. 3. I acknowledge that a physician from [facility] or their designate(s), recommended anesthesia for me using her/his medical judgment. The type(s) of anesthesia I may receive include:                a) General Anesthesia, b) Spinal/Epidural Anesthesia, c) Regional Anesthesia or d) Monitored Anesthesia Care. 4. If my spinal, regional or monitored anesthesia care (local) is not satisfactory for my comfort, or if my medical condition requires, I consent to the administration of general anesthesia. 5. I am aware that the practice of anesthesiology is not an exact science and that some foreseeable risks or consequences may occur. Some common risks/consequences include sore throat and hoarseness, nausea and vomiting, muscle soreness, backache, damage to the mouth/teeth/vocal cords and eye injury. I understand that more rare but serious potential risks of anesthesia include blood pressure changes, drug reactions, cardiac arrest, brain damage, paralysis or death. These risks apply to whether I have general, spinal/epidural, regional or monitored anesthesia care. 6. OBSTETRIC PATIENTS: Specific risks/consequences of spinal/epidural anesthesia may include itching, low blood pressure, difficulty urinating, slowing of the baby's heart rate and headache.  Rare risks include infections, high spinal block, spinal bleeding, seizure, cardiac arrest and death. 7. AWARENESS: I understand that it is possible (but unlikely) to have explicit memory of events from the operating room while under general anesthesia. 8. ELECTROCONVULSIVE THERAPY PATIENTS: This consent serves for all treatments in a single course of therapy. 9. I understand that I must inform my anesthesiologist when I last ate and/or drank to minimize the risk of anesthesia. 10. If I am pregnant, or may pregnant, I understand that elective surgery should be postponed until after the baby is born. Anesthetics cross the placenta and may temporarily anesthetize the baby. Although fetal complications of anesthesia during pregnancy are rare, they may include birth defects, premature labor, brain damage and death. 11. I certify that I informed the anesthesiologist, to the best of my ability, about medication I take including blood thinners, anticoagulants, herbal remedies, narcotics and recreational drugs (e.g. cocaine, marijuana, PCP). Failure to inform my anesthesiologist about these medicines may increase my risk of anesthetic complications. The nature and purpose of my anesthetic management was explained to me. I had the opportunity to ask questions and the answers and information provided meet my satisfaction.   I retain the right to withdraw this consent at any time prior to the administration of said anesthetic.    ___________________________________________________           _____________________________________________________  Patient Signature                                                                                      Witness Signature                ___________________________________________________           _____________________________________________________  Date/Time                                                                                               Responsible person in case of minor/ unconscious pt /Relationship    My signature below affirms that prior to the time of the procedure, I have explained to the patient and/or his/her guardian, the risks and benefits of undergoing anesthesia, as well as any reasonable alternatives.     ___________________________________________________            _____________________________________________________  Physician Signature                            Date/Time  Patient Name: Houston Antunez     : 1960     Printed: 2022      Medical Record #: [id number]                              Page 1 of 1    ----------ANESTHESIA CONSENT----------

## (undated) NOTE — LETTER
[address] [place] [address]      Authorization for Surgical Operation and Procedure     Date:___________                                                                                                         Time:__________  1. I hereby Eleonora Vargas MD, my physician and his/her assistants (if applicable), which may include medical students, residents, and/or fellows, to perform the following surgical operation/ procedure and administer such anesthesia as may be determined necessary by my physician:  Operation/Procedure name (s) COLONOSCOPY  on [address] [address]   2. I recognize that during the surgical operation/procedure, unforeseen conditions may necessitate additional or different procedures than those listed above. I, therefore, further authorize and request that the above-named surgeon, assistants, or designees perform such procedures as are, in their judgment, necessary and desirable. 3.   My surgeon/physician has discussed prior to my surgery the potential benefits, risks and side effects of this procedure; the likelihood of achieving goals; and potential problems that might occur during recuperation. They also discussed reasonable alternatives to the procedure, including risks, benefits, and side effects related to the alternatives and risks related to not receiving this procedure. I have had all my questions answered and I acknowledge that no guarantee has been made as to the result that may be obtained. 4.   Should the need arise during my operation or immediate post-operative period, I also consent to the administration of blood and/or blood products. Further, I understand that despite careful testing and screening of blood or blood products by collecting agencies, I may still be subject to ill effects as a result of receiving a blood transfusion and/or blood products.   The following are some, but not all, of the potential risks that can occur: fever and allergic reactions, hemolytic reactions, transmission of diseases such as Hepatitis, AIDS and Cytomegalovirus (CMV) and fluid overload. In the event that I wish to have an autologous transfusion of my own blood, or a directed donor transfusion. I will discuss this with my physician. 5.   I authorize the use of any specimen, organs, tissues, body parts or foreign objects that may be removed from my body during the operation/procedure for diagnosis, research or teaching purposes and their subsequent disposal by hospital authorities. I also authorize the release of specimen test results and/or written reports to my treating physician on the hospital medical staff or other referring or consulting physicians involved in my care, at the discretion of the Pathologist or my treating physician. 6.   I consent to the photographing or videotaping of the operations or procedures to be performed, including appropriate portions of my body for medical, scientific, or educational purposes, provided my identity is not revealed by the pictures or by descriptive texts accompanying them. If the procedure has been photographed/videotaped, the surgeon will obtain the original picture, image, videotape or CD. The hospital will not be responsible for storage, release or maintenance of the picture, image, tape or CD.    7.   I consent to the presence of a  or observers in the operating room as deemed necessary by my physician or their designees. 8.   I recognize that in the event my procedure results in extended X-Ray/fluoroscopy time, I may develop a skin reaction. 9. If I have a Do Not Attempt Resuscitation (DNAR) order in place, that status will be suspended while in the operating room, procedural suite, and during the recovery period unless otherwise explicitly stated by me (or a person authorized to consent on my behalf).  The surgeon or my attending physician will determine when the applicable recovery period ends for purposes of reinstating the DNAR order. 10. Patients having a sterilization procedure: I understand that if the procedure is successful the results will be permanent and it will therefore be impossible for me to inseminate, conceive, or bear children. I also understand that the procedure is intended to result in sterility, although the result has not been guaranteed. 11. I acknowledge that my physician has explained sedation/analgesia administration to me including the risk and benefits I consent to the administration of sedation/analgesia as may be necessary or desirable in the judgment of my physician. I CERTIFY THAT I HAVE READ AND FULLY UNDERSTAND THE ABOVE CONSENT TO OPERATION and/or OTHER PROCEDURE.    _________________________________________  __________________________________  Signature of Patient     Signature of Responsible Person         ___________________________________         Printed Name of Responsible Person           _________________________________                  Relationship to Patient  _________________________________________  ______________________________  Signature of Witness          Date  Time    STATEMENT OF PHYSICIAN My signature below affirms that prior to the time of the procedure; I have explained to the patient and/or his/her legal representative, the risks and benefits involved in the proposed treatment and any reasonable alternative to the proposed treatment. I have also explained the risks and benefits involved in refusal of the proposed treatment and alternatives to the proposed treatment and have answered the patient's questions. If I have a significant financial interest in a co-management agreement or a significant financial interest in any product or implant, or other significant relationship used in this procedure/surgery, I have disclosed this and had a discussion with my patient. _______________________________________________________________ _____________________________  Sri Escalante Physician)                                                                                         (Date)                                   (Time)        Patient Name: Bess Jackson    : [birth year]   Printed: 2022      Medical Record #: [id number]                                              Page 1 of 1

## (undated) NOTE — IP AVS SNAPSHOT
[address]  [address] [facility] [phone]                Discharge Summary   5/19/2017    Red Licea           Admission Information        Provider Department    5/19/2017 Crystal Guido MD [facility] Fide Alvarado Immunization History as of 5/19/2017  Never Reviewed    No immunizations on file.       Recent Hematology Lab Results  (Last 3 results in the past 90 days)    WBC RBC Hemoglobin Hematocrit MCV MCH MCHC RDW Platelet MPV    (80/67/38)  7.3 (04/19/17)  5.12 coverage. Patient [address] is a Federal Navigator program that can help with your Affordable Care Act coverage, as well as all types of Medicaid plans.   To get signed up and covered, please call [phone] and ask to get set up for an insuran and/or abnormal heart rates/rhythms   Most common side effects: Dizziness or feeling lightheaded (especially with standing), heart rate changes, headaches, nausea/vomiting   What to report to your healthcare team:  Dizziness, nausea, chest pain, weakness,

## (undated) NOTE — LETTER
[facility]  Administration of Anesthesia  1. Rema Gitelman, or _________________________________ acting on his behalf, (Patient) (Dependent/Representative) request to receive anesthesia for my pending procedure/operation/treatment. A physician (anesthesiologist) alone or an anesthesiologist working with a nurse anesthetist may administer my anesthesia. 2. I understand that my anesthesiologist is not an employee or agent of the hospital, but is an independent medical practitioner who has been permitted to use its facilities for the care and treatment of his/her patients. 3. I acknowledge that a physician from [facility] or their designate(s), recommended anesthesia for me using her/his medical judgment. The type(s) of anesthesia I may receive include:                a) General Anesthesia, b) Spinal/Epidural Anesthesia, c) Regional Anesthesia or d) Monitored Anesthesia Care. 4. If my spinal, regional or monitored anesthesia care (local) is not satisfactory for my comfort, or if my medical condition requires, I consent to the administration of general anesthesia. 5. I am aware that the practice of anesthesiology is not an exact science and that some foreseeable risks or consequences may occur. Some common risks/consequences include sore throat and hoarseness, nausea and vomiting, muscle soreness, backache, damage to the mouth/teeth/vocal cords and eye injury. I understand that more rare but serious potential risks of anesthesia include blood pressure changes, drug reactions, cardiac arrest, brain damage, paralysis or death. These risks apply to whether I have general, spinal/epidural, regional or monitored anesthesia care. 6. OBSTETRIC PATIENTS: Specific risks/consequences of spinal/epidural anesthesia may include itching, low blood pressure, difficulty urinating, slowing of the baby's heart rate and headache.  Rare risks include infections, high spinal block, spinal bleeding, seizure, cardiac arrest and death. 7. AWARENESS: I understand that it is possible (but unlikely) to have explicit memory of events from the operating room while under general anesthesia. 8. ELECTROCONVULSIVE THERAPY PATIENTS: This consent serves for all treatments in a single course of therapy. 9. I understand that I must inform my anesthesiologist when I last ate and/or drank to minimize the risk of anesthesia. 10. If I am pregnant, or may pregnant, I understand that elective surgery should be postponed until after the baby is born. Anesthetics cross the placenta and may temporarily anesthetize the baby. Although fetal complications of anesthesia during pregnancy are rare, they may include birth defects, premature labor, brain damage and death. 11. I certify that I informed the anesthesiologist, to the best of my ability, about medication I take including blood thinners, anticoagulants, herbal remedies, narcotics and recreational drugs (e.g. cocaine, marijuana, PCP). Failure to inform my anesthesiologist about these medicines may increase my risk of anesthetic complications. The nature and purpose of my anesthetic management was explained to me. I had the opportunity to ask questions and the answers and information provided meet my satisfaction.   I retain the right to withdraw this consent at any time prior to the administration of said anesthetic.    ___________________________________________________           _____________________________________________________  Patient Signature                                                                                      Witness Signature                ___________________________________________________           _____________________________________________________  Date/Time                                                                                               Responsible person in case of minor/ unconscious pt /Relationship    My signature below affirms that prior to the time of the procedure, I have explained to the patient and/or his/her guardian, the risks and benefits of undergoing anesthesia, as well as any reasonable alternatives.     ___________________________________________________            _____________________________________________________  Physician Signature                            Date/Time  Patient Name: Caroline Rose     : 1960     Printed: 2022      Medical Record #: [id number]                              Page 1 of 1    ----------ANESTHESIA CONSENT----------

## (undated) NOTE — LETTER
01/10/19        Donya Cee  [address] [facility]      Dear Trina Rogers records indicate that you have outstanding lab work and or testing that was ordered for you and has not yet been completed:  Orders Placed This Encounter      Lipid Pan

## (undated) NOTE — LETTER
[facility]  Administration of Anesthesia  1. Lilly Nieves, or _________________________________ acting on his/her behalf, (Patient) (Dependent/Representative) request to receive anesthesia for my pending procedure/operation/treatment. baby's heart rate and headache. Rare risks include infections, high spinal         block, spinal bleeding, seizure, cardiac arrest and death.   7.   AWARENESS: I understand that it is possible (but unlikely) to have explicit memory of events from the operat (Responsible person in case of minor/ unconscious pt) /Relationship    My signature below affirms that prior to the time of the procedure, I have explained to the

## (undated) NOTE — LETTER
[address], [place]  Authorization for Invasive Procedures  1.  I hereby authorize Dr. Graham Nails , my physician and whomever may be designated as the doctor's assistant, to perform the following operation and/or procedure:  Elective ca performed for the purposes of advancing medicine, science, and/or education, provided my identity is not revealed. If the procedure has been videotaped, the physician/surgeon will obtain the original videotape.  The hospital will not be responsible for stor My signature below affirms that prior to the time of the procedure, I have explained to the patient and/or his legal representative, the risks and benefits involved in the proposed treatment and any reasonable alternative to the proposed treatment.  I have

## (undated) NOTE — MR AVS SNAPSHOT
Erick Deal [address] [place] [address] 185 1604 [phone]               Thank you for choosing us for your health care visit with Sosa Hagen NP.   We are glad to serve you and happy to provide yo MRI Wide Bore Available    [facility] ([address] [facility])  [address], 20 [facility]  [address], [address] (MRI Only)  [address] [facility] sleep center at [phone]    The physician referral line to find a primary care physician at [phone]        Additional recommendations:     · Compare your home medications to the medication list attached, call with questions or there are a [address], [phone], [phone]  [address], [place]IN [facility] [address]    Hours:  24-hours Phone:  [phone]    - digoxin 0.125 MG Tabs            Results of Recent Testing       MyChart     Sign active are less likely to develop some chronic diseases than adults who are inactive.      HOW TO GET STARTED: HOW TO STAY MOTIVATED:   Start activities slowly and build up over time Do what you like   Get your heart pumping – brisk walking, biking, swimmin

## (undated) NOTE — IP AVS SNAPSHOT
[address] [place] [address] [phone]                Discharge Summary   6/13/2017    Sari Ireland           Admission Information        Provider Department    6/13/2017 Kari Gutierrez MD [facility] 4w/Sw/Se Commonly known as:  LANOXIN                Where to Get Your Medications      These medications were sent to [facility] [address] [place] [address] 38, [address] [facility]  [address] [facility] 6, [phone], Deneen Sherman, [id number] Glucose BUN Creatinine Calcium Alkaline Phosph AST    -- (06/16/17)  123 (H) (06/16/17)  7 (L) (06/16/17)  1.03 (06/16/17)  9.5 (06/14/17)  52 (06/14/17)  18      Metabolic Lab Results  (Last result in the past 90 days)    ALT Bilirubin,Total Total Your unique [employer] Access Code: [email or web address]  Expires: 6/20/2017  1:21 PM    If you have questions, you can call [phone] to talk to our [facility] Staff. Remember, [employer] is NOT to be used for urgent needs. For medical emergencies, dial 911.

## (undated) NOTE — LETTER
[facility]  Administration of Anesthesia  ILee agree to be cared for by a physician anesthesiologist alone and/or with a nurse anesthetist, who is specially trained to monitor me and give me medicine to put me to sleep or keep me comfortable during my procedure    I understand that my anesthesiologist and/or anesthetist is not an employee or agent of [facility] or [employer]. He or she works for [facility]    As the patient asking for anesthesia services, I agree to:  Allow the anesthesiologist (anesthesia doctor) to give me medicine and do additional procedures as necessary. Some examples are: Starting or using an “IV” to give me medicine, fluids or blood during my procedure, and having a breathing tube placed to help me breathe when I’m asleep (intubation). In the event that my heart stops working properly, I understand that my anesthesiologist will make every effort to sustain my life, unless otherwise directed by [facility] Do Not Resuscitate documents.  Tell my anesthesia doctor before my procedure:  If I am pregnant.  The last time that I ate or drank.  iii. All of the medicines I take (including prescriptions, herbal supplements, and pills I can buy without a prescription (including street drugs/illegal medications). Failure to inform my anesthesiologist about these medicines may increase my risk of anesthetic complications.  iv.If I am allergic to anything or have had a reaction to anesthesia before.  I understand how the anesthesia medicine will help me (benefits).  I understand that with any type of anesthesia medicine there are risks:  The most common risks are: nausea, vomiting, sore throat, muscle soreness, damage to my eyes, mouth, or teeth (from breathing tube placement).  Rare risks include: remembering what happened during my procedure, allergic reactions to medications, injury to my airway, heart, lungs, vision, nerves, or  muscles and in extremely rare instances death.  My doctor has explained to me other choices available to me for my care (alternatives).  Pregnant Patients (“epidural”):  I understand that the risks of having an epidural (medicine given into my back to help control pain during labor), include itching, low blood pressure, difficulty urinating, headache or slowing of the baby’s heart. Very rare risks include infection, bleeding, seizure, irregular heart rhythms and nerve injury.  Regional Anesthesia (“spinal”, “epidural”, & “nerve blocks”):  I understand that rare but potential complications include headache, bleeding, infection, seizure, irregular heart rhythms, and nerve injury.    _____________________________________________________________________________  Patient (or Representative) Signature/Relationship to Patient  Date   Time    _____________________________________________________________________________   Name (if used)    Language/Organization   Time    _____________________________________________________________________________  Nurse Anesthetist Signature     Date   Time  _____________________________________________________________________________  Anesthesiologist Signature     Date   Time  I have discussed the procedure and information above with the patient (or patient’s representative) and answered their questions. The patient or their representative has agreed to have anesthesia services.    _____________________________________________________________________________  Witness        Date   Time  I have verified that the signature is that of the patient or patient’s representative, and that it was signed before the procedure  Patient Name: Lee Sunshine     : 1960                 Printed: 2025 at 1:26 PM    Medical Record #: [id number]                                            Page 1 of 1  ----------ANESTHESIA CONSENT----------

## (undated) NOTE — LETTER
AUTHORIZATION FOR SURGICAL OPERATION OR OTHER PROCEDURE    1. I hereby authorize Dr. Steinberg/ [facility], [facility], and [facility] staff assigned to my case to perform the following operation and/or procedure at the [facility] site:    _______________________________________________________________________________________________    Cortisone injection and Aspiration to Right knee  _______________________________________________________________________________________________    2.  My physician has explained the nature and purpose of the operation or other procedure, possible alternative methods of treatment, the risks involved, and the possibility of complication to me.  I acknowledge that no guarantee has been made as to the result that may be obtained.  3.  I recognize that, during the course of this operation, or other procedure, unforseen conditions may necessitate additional or different procedure than those listed above.  I, therefore, further authorize and request that the above named physician, his/her physician assistants or designees perform such procedures as are, in his/her professional opinion, necessary and desirable.  4.  Any tissue or organs removed in the operation or other procedure may be disposed of by and at the discretion of the [facility] and [facility].  5.  I understand that in the event of a medical emergency, I will be transported by local paramedics to [facility] or other hospital emergency department.  6.  I certify that I have read and fully understand the above consent to operation and/or other procedure.    7.  I acknowledge that my physician has explained sedation/analgesia administration to me including the risks and benefits.  I consent to the administration of sedation/analgesia as may be necessary or desirable in the judgement of my physician.    Witness signature:  ___________________________________________________ Date:  ______/______/_____                    Time:  ________ A.M.  P.M.       Patient Name:  ______________________________________________________  (please print)      Patient signature:  ___________________________________________________             Relationship to Patient:           []  Parent    Responsible person                          []  Spouse  In case of minor or                    [] Other  _____________   Incompetent name:  __________________________________________________                               (please print)      _____________      Responsible person  In case of minor or  Incompetent signature:  _______________________________________________    Statement of Physician  My signature below affirms that prior to the time of the procedure, I have explained to the patient and/or his/her guardian, the risks and benefits involved in the proposed treatment and any reasonable alternative to the proposed treatment.  I have also explained the risks and benefits involved in the refusal of the proposed treatment and have answered the patient's questions.                        Date:  ______/______/_______  Provider                      Signature:  __________________________________________________________       Time:  ___________ CELESTINE ARMSTRONG

## (undated) NOTE — IP AVS SNAPSHOT
[address]  [address] [facility] [phone]                Discharge Summary   4/18/2017    [facility]           Admission Information        Provider Department    4/18/2017 Yolanda Perkins MD, Matthew Platt MD [facility] 5w Please  your prescriptions at the location directed by your doctor or nurse     Bring a paper prescription for each of these medications    - apixaban 5 MG Tabs  - Metoprolol Tartrate 100 MG Tabs            Future Appointments     Apr 26, 2017  3:00 All belongings returned to patient at discharge Pt's bedside belongings    Medications Sent Home None to return    Medications Returned:           Additional Information       We are concerned for your overall well being:    - If you are a smoker or have s As your caregivers, we want you to be aware of the medications you are prescribed to take and their potential SIDE EFFECTS. Your nurse will review your medications with you before you are discharged, and can provide you with additional printed information.

## (undated) NOTE — LETTER
12/6/2019              Yolanda Quinn        [address] [facility]         Dear Lyndsey Bailey,    It was a pleasure to see you. Your Calcium Score was moderately elevated, but you are on  Crestor.   Your LDL is now at goal, so at this time, please c

## (undated) NOTE — LETTER
[facility]  [address], [place], [place]    Authorization for Surgical Operation and Procedure                               I hereby authorize Miguel Angel Roche MD, my physician and his/her assistants (if applicable), which may include medical students, residents, and/or fellows, to perform the following surgical operation/ procedure and administer such anesthesia as may be determined necessary by my physician: Operation/Procedure name (s) COLONOSCOPY on Lee Sunshine   2.   I recognize that during the surgical operation/procedure, unforeseen conditions may necessitate additional or different procedures than those listed above.  I, therefore, further authorize and request that the above-named surgeon, assistants, or designees perform such procedures as are, in their judgment, necessary and desirable.    3.   My surgeon/physician has discussed prior to my surgery the potential benefits, risks and side effects of this procedure; the likelihood of achieving goals; and potential problems that might occur during recuperation.  They also discussed reasonable alternatives to the procedure, including risks, benefits, and side effects related to the alternatives and risks related to not receiving this procedure.  I have had all my questions answered and I acknowledge that no guarantee has been made as to the result that may be obtained.    4.   Should the need arise during my operation/procedure, which includes change of level of care prior to discharge, I also consent to the administration of blood and/or blood products.  Further, I understand that despite careful testing and screening of blood or blood products by collecting agencies, I may still be subject to ill effects as a result of receiving a blood transfusion and/or blood products.  The following are some, but not all, of the potential risks that can occur: fever and allergic reactions, hemolytic reactions, transmission of diseases such as  Hepatitis, AIDS and Cytomegalovirus (CMV) and fluid overload.  In the event that I wish to have an autologous transfusion of my own blood, or a directed donor transfusion, I will discuss this with my physician.  Check only if Refusing Blood or Blood Products  I understand refusal of blood or blood products as deemed necessary by my physician may have serious consequences to my condition to include possible death. I hereby assume responsibility for my refusal and release the hospital, its personnel, and my physicians from any responsibility for the consequences of my refusal.    o  Refuse   5.   I authorize the use of any specimen, organs, tissues, body parts or foreign objects that may be removed from my body during the operation/procedure for diagnosis, research or teaching purposes and their subsequent disposal by hospital authorities.  I also authorize the release of specimen test results and/or written reports to my treating physician on the hospital medical staff or other referring or consulting physicians involved in my care, at the discretion of the Pathologist or my treating physician.    6.   I consent to the photographing or videotaping of the operations or procedures to be performed, including appropriate portions of my body for medical, scientific, or educational purposes, provided my identity is not revealed by the pictures or by descriptive texts accompanying them.  If the procedure has been photographed/videotaped, the surgeon will obtain the original picture, image, videotape or CD.  The hospital will not be responsible for storage, release or maintenance of the picture, image, tape or CD.    7.   I consent to the presence of a  or observers in the operating room as deemed necessary by my physician or their designees.    8.   I recognize that in the event my procedure results in extended X-Ray/fluoroscopy time, I may develop a skin reaction.    9. If I have a Do Not Attempt  Resuscitation (DNAR) order in place, that status will be suspended while in the operating room, procedural suite, and during the recovery period unless otherwise explicitly stated by me (or a person authorized to consent on my behalf). The surgeon or my attending physician will determine when the applicable recovery period ends for purposes of reinstating the DNAR order.  10. Patients having a sterilization procedure: I understand that if the procedure is successful the results will be permanent and it will therefore be impossible for me to inseminate, conceive, or bear children.  I also understand that the procedure is intended to result in sterility, although the result has not been guaranteed.   11. I acknowledge that my physician has explained sedation/analgesia administration to me including the risk and benefits I consent to the administration of sedation/analgesia as may be necessary or desirable in the judgment of my physician.    I CERTIFY THAT I HAVE READ AND FULLY UNDERSTAND THE ABOVE CONSENT TO OPERATION and/or OTHER PROCEDURE.     ____________________________________  _________________________________        ______________________________  Signature of Patient    Signature of Responsible Person                Printed Name of Responsible Person                                      ____________________________________  _____________________________                ________________________________  Signature of Witness        Date  Time         Relationship to Patient    STATEMENT OF PHYSICIAN My signature below affirms that prior to the time of the procedure; I have explained to the patient and/or his/her legal representative, the risks and benefits involved in the proposed treatment and any reasonable alternative to the proposed treatment. I have also explained the risks and benefits involved in refusal of the proposed treatment and alternatives to the proposed treatment and have answered the patient's  questions. If I have a significant financial interest in a co-management agreement or a significant financial interest in any product or implant, or other significant relationship used in this procedure/surgery, I have disclosed this and had a discussion with my patient.     _____________________________________________________              _____________________________  (Signature of Physician)                                                                                         (Date)                                   (Time)  Patient Name: Lee MATA Jong      : 1960      Printed: 2025     Medical Record #: [id number]                                      Page 1 of 1

## (undated) NOTE — LETTER
AUTHORIZATION FOR SURGICAL OPERATION OR OTHER PROCEDURE    1. I hereby authorize Dr. Steinberg/ LEI Whipple , and [facility] staff assigned to my case to perform the following operation and/or procedure at the [facility] site:    Cortisone injection in Right knee   _______________________________________________________________________________________________      _______________________________________________________________________________________________    2.  My physician has explained the nature and purpose of the operation or other procedure, possible alternative methods of treatment, the risks involved, and the possibility of complication to me.  I acknowledge that no guarantee has been made as to the result that may be obtained.  3.  I recognize that, during the course of this operation, or other procedure, unforseen conditions may necessitate additional or different procedure than those listed above.  I, therefore, further authorize and request that the above named physician, his/her physician assistants or designees perform such procedures as are, in his/her professional opinion, necessary and desirable.  4.  Any tissue or organs removed in the operation or other procedure may be disposed of by and at the discretion of the [facility] and [facility].  5.  I understand that in the event of a medical emergency, I will be transported by local paramedics to [facility] or other hospital emergency department.  6.  I certify that I have read and fully understand the above consent to operation and/or other procedure.    7.  I acknowledge that my physician has explained sedation/analgesia administration to me including the risks and benefits.  I consent to the administration of sedation/analgesia as may be necessary or desirable in the judgement of my physician.    Witness signature: ___________________________________________________ Date:   ______/______/_____                    Time:  ________ A.M.  P.M.       Patient Name:  ______________________________________________________  (please print)      Patient signature:  ___________________________________________________             Relationship to Patient:           []  Parent    Responsible person                          []  Spouse  In case of minor or                    [] Other  _____________   Incompetent name:  __________________________________________________                               (please print)      _____________      Responsible person  In case of minor or  Incompetent signature:  _______________________________________________    Statement of Physician  My signature below affirms that prior to the time of the procedure, I have explained to the patient and/or his/her guardian, the risks and benefits involved in the proposed treatment and any reasonable alternative to the proposed treatment.  I have also explained the risks and benefits involved in the refusal of the proposed treatment and have answered the patient's questions.                        Date:  ______/______/_______  Provider                      Signature:  __________________________________________________________       Time:  ___________ A.M    P.M.

## (undated) NOTE — LETTER
[address] [address]  [phone]    08/20/20          Panda Jc MD  [address]          Patient: Kaitlynn Yanes   YOB: 1960   Date o

## (undated) NOTE — MR AVS SNAPSHOT
[facility] [address] [place] [address] [phone]               Thank you for choosing us for your health care visit with Lindsay Jim NP.   We are glad to serve you and happy to provide yo · Compare your home medications to the medication list attached, call with questions or there are any differences    · Heart healthy, decreased refined sugars, and  2000 mg sodium restricted diet and maintain fluids at  64 ounces per day.  Common high sodiu Calcium, Total 9.7 8.5-10.5 mg/dL    BUN/CREA Ratio 14.7 10.0-20.0    Anion Gap 8 0-18    Calculated Osmolality 293 275-295 mOsm/kg    GFR, Non- >60 >=60    GFR, -American >60 >=60      Chronic Kidney Disease: GFR <60 ml/min/1.73 m2

## (undated) NOTE — LETTER
[facility], [place]  Authorization for Invasive Procedures  1.  I hereby authorize Dr Bryant Jon , my physician and whomever may be designated as the doctor's assistant, to perform the following operation and/or procedure:Cardioversion science, and/or education, provided my identity is not revealed. If the procedure has been videotaped, the physician/surgeon will obtain the original videotape. The hospital will not be responsible for storage or maintenance of this tape.      6. I consent explained to the patient and/or his legal representative, the risks and benefits involved in the proposed treatment and any reasonable alternative to the proposed treatment.  I have also explained the risks and benefits involved in the refusal of the propos

## (undated) NOTE — LETTER
AUTHORIZATION FOR SURGICAL OPERATION OR OTHER PROCEDURE    1. I hereby authorize LAURENCE Humphreys, and [address] [place] [address] staff assigned to my case to perform the following operation and/or procedure at the [address] [place] [address]:    _____________Cortisone injection Right knee________________________      _______________________________________________________________________________________________    2. My physician has explained the nature and purpose of the operation or other procedure, possible alternative methods of treatment, the risks involved, and the possibility of complication to me. I acknowledge that no guarantee has been made as to the result that may be obtained. 3.  I recognize that, during the course of this operation, or other procedure, unforseen conditions may necessitate additional or different procedure than those listed above. I, therefore, further authorize and request that the above named physician, his/her physician assistants or designees perform such procedures as are, in his/her professional opinion, necessary and desirable. 4.  Any tissue or organs removed in the operation or other procedure may be disposed of by and at the discretion of the [address] [place] [address] and [facility] AT [facility]. 5.  I understand that in the event of a medical emergency, I will be transported by local paramedics to [facility] or other hospital emergency department. 6.  I certify that I have read and fully understand the above consent to operation and/or other procedure. 7.  I acknowledge that my physician has explained sedation/analgesia administration to me including the risks and benefits. I consent to the administration of sedation/analgesia as may be necessary or desirable in the judgement of my physician. Witness signature: ___________________________________________________ Date:  ______/______/_____                    Time:  ________ A. M.  P.M.        Patient Name: ______________________________________________________  (please print)      Patient signature:  ___________________________________________________             Relationship to Patient:           []  Parent    Responsible person                          []  Spouse  In case of minor or                    [] Other  _____________   Incompetent name:  __________________________________________________                               (please print)      _____________      Responsible person  In case of minor or  Incompetent signature:  _______________________________________________    Statement of Physician  My signature below affirms that prior to the time of the procedure, I have explained to the patient and/or his/her guardian, the risks and benefits involved in the proposed treatment and any reasonable alternative to the proposed treatment. I have also explained the risks and benefits involved in the refusal of the proposed treatment and have answered the patient's questions.                         Date:  ______/______/_______  Provider                      Signature:  __________________________________________________________       Time:  ___________ A.M    P.M.